# Patient Record
Sex: FEMALE | Race: WHITE | Employment: OTHER | URBAN - METROPOLITAN AREA
[De-identification: names, ages, dates, MRNs, and addresses within clinical notes are randomized per-mention and may not be internally consistent; named-entity substitution may affect disease eponyms.]

---

## 2018-06-02 LAB
CREATININE, EXTERNAL: 0.91
HBA1C MFR BLD HPLC: 7.3 %
LDL-C, EXTERNAL: 78
MICROALBUMIN UR TEST STR-MCNC: 13.8 MG/DL

## 2018-08-24 PROBLEM — N18.2 CHRONIC KIDNEY DISEASE (CKD), STAGE II (MILD): Status: ACTIVE | Noted: 2018-08-24

## 2018-08-24 PROBLEM — G43.109 CLASSIC MIGRAINE: Status: ACTIVE | Noted: 2018-08-24

## 2018-08-24 PROBLEM — E11.9 TYPE 2 DIABETES MELLITUS (HCC): Status: ACTIVE | Noted: 2018-08-24

## 2018-08-24 PROBLEM — E78.00 HYPERCHOLESTEROLEMIA: Status: ACTIVE | Noted: 2018-08-24

## 2018-08-24 PROBLEM — I48.91 ATRIAL FIBRILLATION (HCC): Status: ACTIVE | Noted: 2018-08-24

## 2018-08-24 PROBLEM — M25.50 DIFFUSE ARTHRALGIA: Status: ACTIVE | Noted: 2018-08-24

## 2018-08-24 PROBLEM — M54.2 NECK PAIN: Status: ACTIVE | Noted: 2018-08-24

## 2018-08-24 PROBLEM — E03.9 ACQUIRED HYPOTHYROIDISM: Status: ACTIVE | Noted: 2018-08-24

## 2018-08-24 PROBLEM — M65.30 ACQUIRED TRIGGER FINGER: Status: ACTIVE | Noted: 2018-08-24

## 2018-08-24 PROBLEM — M25.50 JOINT PAIN: Status: ACTIVE | Noted: 2018-08-24

## 2018-08-24 PROBLEM — F32.1 MODERATE MAJOR DEPRESSION (HCC): Status: ACTIVE | Noted: 2018-08-24

## 2018-08-24 PROBLEM — I10 ESSENTIAL HYPERTENSION: Status: ACTIVE | Noted: 2018-08-24

## 2018-08-24 RX ORDER — AMLODIPINE BESYLATE 10 MG/1
TABLET ORAL DAILY
COMMUNITY
End: 2018-08-28 | Stop reason: SDUPTHER

## 2018-08-24 RX ORDER — GLIPIZIDE 5 MG/1
TABLET ORAL 2 TIMES DAILY
COMMUNITY
End: 2018-08-28 | Stop reason: SDUPTHER

## 2018-08-24 RX ORDER — LOSARTAN POTASSIUM 100 MG/1
100 TABLET ORAL DAILY
COMMUNITY
End: 2018-08-28 | Stop reason: SDUPTHER

## 2018-08-24 RX ORDER — CELECOXIB 100 MG/1
CAPSULE ORAL 2 TIMES DAILY
COMMUNITY
End: 2018-08-28 | Stop reason: SDUPTHER

## 2018-08-24 RX ORDER — HYDROCHLOROTHIAZIDE 25 MG/1
25 TABLET ORAL DAILY
COMMUNITY
End: 2018-08-28 | Stop reason: SDUPTHER

## 2018-08-24 RX ORDER — RANITIDINE 150 MG/1
150 TABLET, FILM COATED ORAL 2 TIMES DAILY
COMMUNITY
End: 2018-08-28 | Stop reason: SDUPTHER

## 2018-08-24 RX ORDER — LEVOTHYROXINE SODIUM 137 UG/1
TABLET ORAL
COMMUNITY
End: 2018-08-28 | Stop reason: SDUPTHER

## 2018-08-24 RX ORDER — ATORVASTATIN CALCIUM 80 MG/1
80 TABLET, FILM COATED ORAL DAILY
COMMUNITY
End: 2018-08-28 | Stop reason: SDUPTHER

## 2018-08-24 RX ORDER — METOPROLOL SUCCINATE 50 MG/1
TABLET, EXTENDED RELEASE ORAL DAILY
COMMUNITY
End: 2018-08-28 | Stop reason: SDUPTHER

## 2018-08-28 ENCOUNTER — OFFICE VISIT (OUTPATIENT)
Dept: FAMILY MEDICINE CLINIC | Age: 63
End: 2018-08-28

## 2018-08-28 VITALS
DIASTOLIC BLOOD PRESSURE: 78 MMHG | SYSTOLIC BLOOD PRESSURE: 132 MMHG | HEART RATE: 82 BPM | BODY MASS INDEX: 43.4 KG/M2 | WEIGHT: 293 LBS | HEIGHT: 69 IN | RESPIRATION RATE: 18 BRPM | OXYGEN SATURATION: 99 % | TEMPERATURE: 98.1 F

## 2018-08-28 DIAGNOSIS — E11.8 TYPE 2 DIABETES MELLITUS WITH COMPLICATION, WITHOUT LONG-TERM CURRENT USE OF INSULIN (HCC): Primary | ICD-10-CM

## 2018-08-28 DIAGNOSIS — E78.00 HYPERCHOLESTEROLEMIA: ICD-10-CM

## 2018-08-28 RX ORDER — ATORVASTATIN CALCIUM 10 MG/1
10 TABLET, FILM COATED ORAL DAILY
Qty: 90 TAB | Refills: 1 | Status: SHIPPED | OUTPATIENT
Start: 2018-08-28 | End: 2019-02-04 | Stop reason: SDUPTHER

## 2018-08-28 RX ORDER — METOPROLOL SUCCINATE 50 MG/1
50 TABLET, EXTENDED RELEASE ORAL DAILY
Qty: 90 TAB | Refills: 1 | Status: SHIPPED | OUTPATIENT
Start: 2018-08-28 | End: 2019-02-04 | Stop reason: SDUPTHER

## 2018-08-28 RX ORDER — LOSARTAN POTASSIUM 100 MG/1
100 TABLET ORAL DAILY
Qty: 90 TAB | Refills: 1 | Status: SHIPPED | OUTPATIENT
Start: 2018-08-28 | End: 2019-02-04 | Stop reason: SDUPTHER

## 2018-08-28 RX ORDER — RANITIDINE 150 MG/1
150 TABLET, FILM COATED ORAL 2 TIMES DAILY
Qty: 180 TAB | Refills: 1 | Status: SHIPPED | OUTPATIENT
Start: 2018-08-28 | End: 2019-05-15

## 2018-08-28 RX ORDER — CELECOXIB 100 MG/1
100 CAPSULE ORAL 2 TIMES DAILY
Qty: 180 CAP | Refills: 1 | Status: SHIPPED | OUTPATIENT
Start: 2018-08-28 | End: 2019-04-29 | Stop reason: SDUPTHER

## 2018-08-28 RX ORDER — LEVOTHYROXINE SODIUM 137 UG/1
137 TABLET ORAL
Qty: 90 TAB | Refills: 1 | Status: SHIPPED | OUTPATIENT
Start: 2018-08-28 | End: 2019-02-04 | Stop reason: SDUPTHER

## 2018-08-28 RX ORDER — HYDROCHLOROTHIAZIDE 25 MG/1
25 TABLET ORAL DAILY
Qty: 90 TAB | Refills: 1 | Status: SHIPPED | OUTPATIENT
Start: 2018-08-28 | End: 2019-05-15

## 2018-08-28 RX ORDER — AMLODIPINE BESYLATE 10 MG/1
10 TABLET ORAL DAILY
Qty: 90 TAB | Refills: 1 | Status: SHIPPED | OUTPATIENT
Start: 2018-08-28 | End: 2019-02-04 | Stop reason: SDUPTHER

## 2018-08-28 RX ORDER — GLIPIZIDE 5 MG/1
10 TABLET ORAL 2 TIMES DAILY
Qty: 360 TAB | Refills: 1 | Status: SHIPPED | OUTPATIENT
Start: 2018-08-28 | End: 2018-11-15 | Stop reason: ALTCHOICE

## 2018-08-28 NOTE — PROGRESS NOTES
Hpi:Yara Monzon is a 61 y.o. female presenting for Diabetes (discuss truclity) and Cholesterol Problem (follow up)  . Diabetes type:  Type 2 not on insulin  Medications:  Trulicity (dose increased0 and Glipizide  Medication compliance:  Very good  Side effects of meds:  Some low sugars when she started trulicity (43, 50) Now better  Diet compliance:  Sticking to diet, Some sugar, watching starches but still eats a lot of potatoes. Exercise compliance:  No exercise  Home testing:  Currently checking twice a day most days  Fasting sugar range:    Other sugars later in day:  127 last night, highest late in the day 134  Hypoglycemia:  Only when she started Trulicity. No low sugars past several weeks. Last eye appt and physician:  1/2018 Dr. Gail Luna feet:  Watching her feet time to time  Comorbid conditions:  Nonsmoker, Hyperchol, HTN, A fib, Sleep apnea but doesn't use CPAP (lost some parts and will try to hook back up. Hypercholesterolemia  Off atorvastatin entirely for over a month-6-8 weeks  Due to myalgias  Joints don't hurt nearly as much although knees and feet still hurt. Otherwise not much change. Didn't hurt near as much on lower doses of atorvastatin. Problems before she thinks with Crestor        Lab Results   Component Value Date/Time    Hemoglobin A1c, External 7.3 06/02/2018           Review of Systems - Review of Systems   Constitutional: Negative for chills, fever, malaise/fatigue and weight loss. HENT: Negative for ear pain, hearing loss, nosebleeds and sore throat. Eyes: Negative for blurred vision, double vision, photophobia and pain. Respiratory: Negative for cough, hemoptysis, shortness of breath and wheezing. Cardiovascular: Negative for chest pain, palpitations and leg swelling. Gastrointestinal: Negative for abdominal pain, blood in stool, constipation, diarrhea, heartburn, nausea and vomiting.    Genitourinary: Negative for dysuria, frequency, hematuria and urgency. Musculoskeletal: Negative for back pain, falls, joint pain, myalgias and neck pain. Skin: Negative for itching and rash. Neurological: Negative for speech change, focal weakness, seizures, loss of consciousness and headaches. Endo/Heme/Allergies: Negative for polydipsia. Does not bruise/bleed easily. Psychiatric/Behavioral: Negative for depression, hallucinations, memory loss, substance abuse and suicidal ideas. The patient is not nervous/anxious and does not have insomnia. Past Medical History:   Diagnosis Date    Atrial fibrillation (HCC)     Chronic renal failure     Diabetes ([facility] [address]     Hearing loss     Hypertension     Migraine     Osteoarthritis      Past Surgical History:   Procedure Laterality Date    HX DILATION AND CURETTAGE      HX HYSTERECTOMY       Family History   Problem Relation Age of Onset    Heart Disease Mother     Hypertension Mother     Diabetes Mother      Social History     Social History    Marital status:      Spouse name: N/A    Number of children: N/A    Years of education: N/A     Occupational History    Not on file. Social History Main Topics    Smoking status: Former Smoker     Years: 1.00     Types: Cigarettes    Smokeless tobacco: Never Used    Alcohol use No    Drug use: No    Sexual activity: Not on file     Other Topics Concern    Not on file     Social History Narrative    No narrative on file         Current Outpatient Prescriptions   Medication Sig Dispense Refill    acetaminophen (TYLENOL EXTRA STRENGTH PO) Take  by mouth.  dulaglutide (TRULICITY) 1.5 HC/4.2 mL sub-q pen 1.5 mg by SubCUTAneous route every seven (7) days.  rivaroxaban (XARELTO) 20 mg tab tablet Take  by mouth daily.  celecoxib (CELEBREX) 100 mg capsule Take  by mouth two (2) times a day.  raNITIdine (ZANTAC) 150 mg tablet Take 150 mg by mouth two (2) times a day.  amLODIPine (NORVASC) 10 mg tablet Take  by mouth daily.       losartan (COZAAR) 100 mg tablet Take 100 mg by mouth daily.  glipiZIDE (GLUCOTROL) 5 mg tablet Take  by mouth two (2) times a day.  hydroCHLOROthiazide (HYDRODIURIL) 25 mg tablet Take 25 mg by mouth daily.  metoprolol succinate (TOPROL-XL) 50 mg XL tablet Take  by mouth daily.  levothyroxine (SYNTHROID) 137 mcg tablet Take  by mouth Daily (before breakfast).  atorvastatin (LIPITOR) 80 mg tablet Take 80 mg by mouth daily. Allergies   Allergen Reactions    Ibuprofen Hives       Vitals:    08/28/18 0828   BP: 132/78   Pulse: 82   Resp: 18   Temp: 98.1 °F (36.7 °C)   TempSrc: Oral   SpO2: 99%   Weight: 349 lb (158.3 kg)   Height: 5' 8.5\" (1.74 m)   PainSc:   0 - No pain       Physical Examination:General Well appearing, A&O X 4  Neck without nodes normal thyroid  Lungs clear to ausculation  CV irreg rate 80, No M, R, or G. No edema. Neuro Normal Speech  Psych Oriented to person place and time with normal affect and mood. No hallucinations or abnormal thought      ICD-10-CM ICD-9-CM    1. Type 2 diabetes mellitus with complication, without long-term current use of insulin (HCC) E11.8 250.90    2. Hypercholesterolemia E78.00 272.0      Assessment    1. DM type 2 doing well despite some dietary lapses. Labs today  2. Hyperchol with myalgias from statin. Restart in lower dose  3   A fib rate controlled    PLAN:    All meds refilled  Labs:  A1c CBC, BMP AC ratio      Restart atorvastatin in lower dose and see if joint pain recurs    Diabetes is doing well: Work on avoiding sugar and don't overdue the potatoes    Let me know if you have low sugars that are unexpected    Watch your feet daily. Get a flu shot in October. Diagnosis and plan discussed with patient who verbillized understanding.     [facility]

## 2018-08-28 NOTE — PROGRESS NOTES
1. Have you been to the ER, urgent care clinic since your last visit? Hospitalized since your last visit? No    2. Have you seen or consulted any other health care providers outside of the [address] [place] [address] since your last visit? Include any pap smears or colon screening.  Dr. Cynthia Armando

## 2018-08-28 NOTE — PATIENT INSTRUCTIONS
Restart atorvastatin in lower dose and see if joint pain recurs    Diabetes is doing well: Work on avoiding sugar and don't overdue the potatoes    Let me know if you have low sugars that are unexpected    Watch your feet daily. Get a flu shot in October.

## 2018-08-28 NOTE — MR AVS SNAPSHOT
[address] [place] [address] [place] [address] 
[phone] Patient: Magalis Villa MRN: [id number] PIM:[birth year] Visit Information Date & Time Provider Department Dept. Phone Encounter #  
 8/28/2018  8:30 AM Rosalinda Essex, MD [facility] 144 [phone] [id number] Follow-up Instructions Return in about 3 months (around 11/28/2018). Upcoming Health Maintenance Date Due Hepatitis C Screening 1955 FOOT EXAM Q1 1/4/1965 EYE EXAM RETINAL OR DILATED Q1 1/4/1965 Pneumococcal 19-64 Medium Risk (1 of 1 - PPSV23) 1/4/1974 DTaP/Tdap/Td series (1 - Tdap) 1/4/1976 PAP AKA CERVICAL CYTOLOGY 1/4/1976 FOBT Q 1 YEAR AGE 50-75 1/4/2005 ZOSTER VACCINE AGE 60> 11/4/2014 Influenza Age 5 to Adult 8/1/2018 BREAST CANCER SCRN MAMMOGRAM 8/12/2018 HEMOGLOBIN A1C Q6M 12/2/2018 MICROALBUMIN Q1 6/2/2019 LIPID PANEL Q1 6/2/2019 Allergies as of 8/28/2018  Review Complete On: 8/28/2018 By: Rosalinda Essex, MD  
  
 Severity Noted Reaction Type Reactions Ibuprofen  08/24/2018    Hives Current Immunizations  Never Reviewed No immunizations on file. Not reviewed this visit You Were Diagnosed With   
  
 Codes Comments Type 2 diabetes mellitus with complication, without long-term current use of insulin (HCC)    -  Primary ICD-10-CM: E11.8 ICD-9-CM: 250.90 Hypercholesterolemia     ICD-10-CM: E78.00 ICD-9-CM: 272.0 Vitals BP Pulse Temp Resp Height(growth percentile) Weight(growth percentile) 132/78 (BP 1 Location: Left arm, BP Patient Position: Sitting) 82 98.1 °F (36.7 °C) (Oral) 18 5' 8.5\" (1.74 m) 349 lb (158.3 kg) SpO2 BMI OB Status Smoking Status 99% 52.29 kg/m2 Hysterectomy Former Smoker Vitals History BMI and BSA Data Body Mass Index Body Surface Area  
 52.29 kg/m 2 2.77 m 2 Preferred Pharmacy Pharmacy Name Phone [address] [place] [address] Shanique Funes [phone] Your Updated Medication List  
  
   
This list is accurate as of 18  9:18 AM.  Always use your most recent med list. amLODIPine 10 mg tablet Commonly known as:  [place] Take 1 Tab by mouth daily. atorvastatin 10 mg tablet Commonly known as:  LIPITOR Take 1 Tab by mouth daily. celecoxib 100 mg capsule Commonly known as:  CeleBREX Take 1 Cap by mouth two (2) times a day. dulaglutide 1.5 mg/0.5 mL sub-q pen Commonly known as:  TRULICITY  
0.5 mL by SubCUTAneous route every seven (7) days. Indications: type 2 diabetes mellitus  
  
 glipiZIDE 5 mg tablet Commonly known as:  Jocelin  Take 2 Tabs by mouth two (2) times a day. hydroCHLOROthiazide 25 mg tablet Commonly known as:  HYDRODIURIL Take 1 Tab by mouth daily. levothyroxine 137 mcg tablet Commonly known as:  SYNTHROID Take 137 mcg by mouth Daily (before breakfast). losartan 100 mg tablet Commonly known as:  COZAAR Take 1 Tab by mouth daily. metoprolol succinate 50 mg XL tablet Commonly known as:  TOPROL-XL Take 1 Tab by mouth daily. raNITIdine 150 mg tablet Commonly known as:  ZANTAC Take 1 Tab by mouth two (2) times a day. rivaroxaban 20 mg Tab tablet Commonly known as:  Edmonia Punch Take 1 Tab by mouth daily. TYLENOL EXTRA STRENGTH PO Take  by mouth. Prescriptions Sent to Pharmacy Refills  
 rivaroxaban (XARELTO) 20 mg tab tablet 1 Sig: Take 1 Tab by mouth daily. Class: Normal  
 Pharmacy: [address] [place] [address]  Ph #: [phone] Route: Oral  
 dulaglutide (TRULICITY) 1.5 BG/1.5 mL sub-q pen 1 Si.5 mL by SubCUTAneous route every seven (7) days. Indications: type 2 diabetes mellitus Class: Normal  
 Pharmacy: [address] [place] [address]  Ph #: [phone] Route: SubCUTAneous  
 amLODIPine (NORVASC) 10 mg tablet 1 Sig: Take 1 Tab by mouth daily. Class: Normal  
 Pharmacy: [address] [place] [address] #: [phone] Route: Oral  
 atorvastatin (LIPITOR) 10 mg tablet 1 Sig: Take 1 Tab by mouth daily. Class: Normal  
 Pharmacy: [address] [place] [address] Ph #: [phone] Route: Oral  
 celecoxib (CELEBREX) 100 mg capsule 1 Sig: Take 1 Cap by mouth two (2) times a day. Class: Normal  
 Pharmacy: [address] [place] [address] Ph #: [phone] Route: Oral  
 glipiZIDE (GLUCOTROL) 5 mg tablet 1 Sig: Take 2 Tabs by mouth two (2) times a day. Class: Normal  
 Pharmacy: [address] [place] [address] Ph #: [phone] Route: Oral  
 hydroCHLOROthiazide (HYDRODIURIL) 25 mg tablet 1 Sig: Take 1 Tab by mouth daily. Class: Normal  
 Pharmacy: [address] [place] [address] Ph #: [phone] Route: Oral  
 levothyroxine (SYNTHROID) 137 mcg tablet 1 Sig: Take 137 mcg by mouth Daily (before breakfast). Class: Normal  
 Pharmacy: [address] [place] [address] Ph #: [phone] Route: Oral  
 losartan (COZAAR) 100 mg tablet 1 Sig: Take 1 Tab by mouth daily. Class: Normal  
 Pharmacy: [address] [place] [address] Ph #: [phone] Route: Oral  
 metoprolol succinate (TOPROL-XL) 50 mg XL tablet 1 Sig: Take 1 Tab by mouth daily. Class: Normal  
 Pharmacy: [address] [place] [address] Ph #: [phone] Route: Oral  
 raNITIdine (ZANTAC) 150 mg tablet 1 Sig: Take 1 Tab by mouth two (2) times a day. Class: Normal  
 Pharmacy: [address] [place] [address] #: [phone]  Route: Oral  
  
 We Performed the Following CBC WITH AUTOMATED DIFF [45208 CPT(R)] HEMOGLOBIN A1C WITH EAG [54904 CPT(R)] METABOLIC PANEL, BASIC [93475 CPT(R)] MICROALBUMIN, UR, RAND W/ MICROALB/CREAT RATIO [id number] CPT(R)] Follow-up Instructions Return in about 3 months (around 11/28/2018). Patient Instructions Restart atorvastatin in lower dose and see if joint pain recurs Diabetes is doing well: Work on avoiding sugar and don't overdue the potatoes Let me know if you have low sugars that are unexpected Watch your feet daily. Get a flu shot in October. Introducing [facility]! [employer] introduces [employer] patient portal. Now you can access parts of your medical record, email your doctor's office, and request medication refills online. 1. In your internet browser, go to [email or web address] 2. Click on the First Time User? Click Here link in the Sign In box. You will see the New Member Sign Up page. 3. Enter your [employer] Access Code exactly as it appears below. You will not need to use this code after youve completed the sign-up process. If you do not sign up before the expiration date, you must request a new code. · [employer] Access Code: [email or web address] Expires: 11/26/2018  9:18 AM 
 
4. Enter the last four digits of your Social Security Number (xxxx) and Date of Birth (mm/dd/yyyy) as indicated and click Submit. You will be taken to the next sign-up page. 5. Create a [employer] ID. This will be your [employer] login ID and cannot be changed, so think of one that is secure and easy to remember. 6. Create a [employer] password. You can change your password at any time. 7. Enter your Password Reset Question and Answer. This can be used at a later time if you forget your password. 8. Enter your e-mail address. You will receive e-mail notification when new information is available in [address] 9. Click Sign Up. You can now view and download portions of your medical record. 10. Click the Download Summary menu link to download a portable copy of your medical information. If you have questions, please visit the Frequently Asked Questions section of the "[employer] website. Remember, "[employer] is NOT to be used for urgent needs. For medical emergencies, dial 911. Now available from your iPhone and Android! Please provide this summary of care documentation to your next provider. If you have any questions after today's visit, please call [phone].

## 2018-08-29 LAB
ALBUMIN/CREAT UR: 4.4 MG/G CREAT (ref 0–30)
BASOPHILS # BLD AUTO: 0 X10E3/UL (ref 0–0.2)
BASOPHILS NFR BLD AUTO: 0 %
BUN SERPL-MCNC: 19 MG/DL (ref 8–27)
BUN/CREAT SERPL: 22 (ref 12–28)
CALCIUM SERPL-MCNC: 9.3 MG/DL (ref 8.7–10.3)
CHLORIDE SERPL-SCNC: 99 MMOL/L (ref 96–106)
CO2 SERPL-SCNC: 26 MMOL/L (ref 20–29)
CREAT SERPL-MCNC: 0.87 MG/DL (ref 0.57–1)
CREAT UR-MCNC: 113.6 MG/DL
EOSINOPHIL # BLD AUTO: 0.3 X10E3/UL (ref 0–0.4)
EOSINOPHIL NFR BLD AUTO: 3 %
ERYTHROCYTE [DISTWIDTH] IN BLOOD BY AUTOMATED COUNT: 14.8 % (ref 12.3–15.4)
EST. AVERAGE GLUCOSE BLD GHB EST-MCNC: 137 MG/DL
GLUCOSE SERPL-MCNC: 97 MG/DL (ref 65–99)
HBA1C MFR BLD: 6.4 % (ref 4.8–5.6)
HCT VFR BLD AUTO: 42.9 % (ref 34–46.6)
HGB BLD-MCNC: 13.7 G/DL (ref 11.1–15.9)
IMM GRANULOCYTES # BLD: 0 X10E3/UL (ref 0–0.1)
IMM GRANULOCYTES NFR BLD: 0 %
LYMPHOCYTES # BLD AUTO: 2.4 X10E3/UL (ref 0.7–3.1)
LYMPHOCYTES NFR BLD AUTO: 29 %
MCH RBC QN AUTO: 27.1 PG (ref 26.6–33)
MCHC RBC AUTO-ENTMCNC: 31.9 G/DL (ref 31.5–35.7)
MCV RBC AUTO: 85 FL (ref 79–97)
MICROALBUMIN UR-MCNC: 5 UG/ML
MONOCYTES # BLD AUTO: 0.4 X10E3/UL (ref 0.1–0.9)
MONOCYTES NFR BLD AUTO: 5 %
NEUTROPHILS # BLD AUTO: 5.1 X10E3/UL (ref 1.4–7)
NEUTROPHILS NFR BLD AUTO: 63 %
PLATELET # BLD AUTO: 307 X10E3/UL (ref 150–379)
POTASSIUM SERPL-SCNC: 4 MMOL/L (ref 3.5–5.2)
RBC # BLD AUTO: 5.05 X10E6/UL (ref 3.77–5.28)
SODIUM SERPL-SCNC: 142 MMOL/L (ref 134–144)
WBC # BLD AUTO: 8.2 X10E3/UL (ref 3.4–10.8)

## 2018-08-30 NOTE — PROGRESS NOTES
LVM with patient with results and instructions  Blood work looks very good.  Diabetic control is excellent.     Keep working on diet and taking meds consistently. She should watch out for low sugars:  Headache, sweats, shakiness, feeling like she has to eat. If she has low sugars, under 70, she needs to let us know.  We may need to reduce the dose of her glipizide. She should see me in 3-4 months sooner as needed.    Stated to call if any questions

## 2018-10-18 ENCOUNTER — OFFICE VISIT (OUTPATIENT)
Dept: FAMILY MEDICINE CLINIC | Age: 63
End: 2018-10-18

## 2018-10-18 VITALS
HEART RATE: 75 BPM | WEIGHT: 293 LBS | BODY MASS INDEX: 43.4 KG/M2 | TEMPERATURE: 97.9 F | OXYGEN SATURATION: 97 % | DIASTOLIC BLOOD PRESSURE: 80 MMHG | SYSTOLIC BLOOD PRESSURE: 147 MMHG | RESPIRATION RATE: 18 BRPM | HEIGHT: 69 IN

## 2018-10-18 DIAGNOSIS — Z12.39 BREAST CANCER SCREENING: ICD-10-CM

## 2018-10-18 DIAGNOSIS — I48.20 CHRONIC ATRIAL FIBRILLATION (HCC): ICD-10-CM

## 2018-10-18 DIAGNOSIS — E78.00 HYPERCHOLESTEREMIA: ICD-10-CM

## 2018-10-18 DIAGNOSIS — Z12.11 COLON CANCER SCREENING: ICD-10-CM

## 2018-10-18 DIAGNOSIS — E03.9 ACQUIRED HYPOTHYROIDISM: ICD-10-CM

## 2018-10-18 DIAGNOSIS — Z23 ENCOUNTER FOR IMMUNIZATION: Primary | ICD-10-CM

## 2018-10-18 DIAGNOSIS — E11.9 CONTROLLED TYPE 2 DIABETES MELLITUS WITHOUT COMPLICATION, WITHOUT LONG-TERM CURRENT USE OF INSULIN (HCC): ICD-10-CM

## 2018-10-18 NOTE — PATIENT INSTRUCTIONS
Diabetes:  Blood sugar goals:  Hemoglobin A1c under 7  Fasting blood sugar   Blood sugar 2 hours after a meal under 180, 4 hours after a meal under 120  No hypoglycemia (sugars under 70 and symptomatic low sugars)    Blood sugar control with diet and exercise:  Exercise 45 minutes per day. This makes your insulin work better. It also allows insulin levels to fall helping with weight loss. Every night, try to fast from your evening meal to breakfast (at least 12 hours) without eating anything. This uses stored energy in your liver and makes insulin work better. Avoid simples sugars such as table sugar in drinks (sodas, lemonade, sweet tea, wine), desserts, candy. Also avoid fruit juices and high fructose corn syrup. Finally, drink less milk which has milk sugar in it. Control your starch intake. Men should have 3-4 carb portions per meal.  Women should have 2-3 carb portions per meal.  A carb serving is the equivalent of a slice of bread. Control your blood pressure and cholesterol. These problems are common in diabetes. AND, don't smoke. All of these problems contribute to heart disease and stroke risk. Get a yearly eye exam and flu shot. Make sure your vaccines are up to date particularly the pneumonia vaccines. Inspect your feet daily. Wear comfortable protective shoes and clean socks. Seek medical care if there are sore, calluses or numbness and burning of your feet. See your doctor at least every 6 months if you are on oral medicines or more often if the diabetic control is not at goal or if you are on insulin. Take your medicines religiously.

## 2018-10-18 NOTE — PROGRESS NOTES
Liudmila Sage is a 61 y.o. female who had concerns including Complete Physical.    Health maintenance:    Immunizations needed: Tdap or DT given today   Pneumovacc 23 done 2013   Prevnar 13 done 2015   Shingrix needs    Influenza vaccine given today    Cancer screening status   Colonoscopy never   PAP hysterrectomy 1994 bleeding and clots, All preceeding paps normal, one ovary remains   Mammogram 2016   Lung CT non smoker    Bone density screening never    Cardiovascular risk factors:   Smoking smoked as a teenager only   HTN under rx    Cholesterol under rx   Diabetes under rx   Chronic kidney disease  no   Family History    Last eye exam:  January 2018, previous eye doctor retired  Last dental exam:  Saw in June 2018    Hypertension  Needs labs,  Near goal  Doesn't check at home    DM2  Metformin, glipizide and trulicity  Takes meds consistently  Some problems sticking to diet    Obesity  BMI 51    Meds:    Current Outpatient Medications:     varicella-zoster recombinant, PF, (SHINGRIX) 50 mcg/0.5 mL susr injection, 0.5 mL by IntraMUSCular route once for 1 dose. Repeat second dose in 6 months., Disp: 0.5 mL, Rfl: 1    acetaminophen (TYLENOL EXTRA STRENGTH PO), Take  by mouth., Disp: , Rfl:     rivaroxaban (XARELTO) 20 mg tab tablet, Take 1 Tab by mouth daily. , Disp: 90 Tab, Rfl: 1    dulaglutide (TRULICITY) 1.5 UW/5.6 mL sub-q pen, 0.5 mL by SubCUTAneous route every seven (7) days. Indications: type 2 diabetes mellitus, Disp: 12 Pen, Rfl: 1    amLODIPine (NORVASC) 10 mg tablet, Take 1 Tab by mouth daily. , Disp: 90 Tab, Rfl: 1    atorvastatin (LIPITOR) 10 mg tablet, Take 1 Tab by mouth daily. , Disp: 90 Tab, Rfl: 1    celecoxib (CELEBREX) 100 mg capsule, Take 1 Cap by mouth two (2) times a day., Disp: 180 Cap, Rfl: 1    glipiZIDE (GLUCOTROL) 5 mg tablet, Take 2 Tabs by mouth two (2) times a day.  (Patient taking differently: Take 5 mg by mouth daily.), Disp: 360 Tab, Rfl: 1    hydroCHLOROthiazide (HYDRODIURIL) 25 mg tablet, Take 1 Tab by mouth daily. , Disp: 90 Tab, Rfl: 1    levothyroxine (SYNTHROID) 137 mcg tablet, Take 137 mcg by mouth Daily (before breakfast). , Disp: 90 Tab, Rfl: 1    losartan (COZAAR) 100 mg tablet, Take 1 Tab by mouth daily. , Disp: 90 Tab, Rfl: 1    metoprolol succinate (TOPROL-XL) 50 mg XL tablet, Take 1 Tab by mouth daily. , Disp: 90 Tab, Rfl: 1    raNITIdine (ZANTAC) 150 mg tablet, Take 1 Tab by mouth two (2) times a day., Disp: 180 Tab, Rfl: 1   Allergies:   Allergies   Allergen Reactions    Ibuprofen Hives     Smoker:   Social History     Tobacco Use   Smoking Status Former Smoker    Years: 1.00    Types: Cigarettes   Smokeless Tobacco Never Used     ETOH:   Social History     Substance and Sexual Activity   Alcohol Use No       FH:    Family History   Problem Relation Age of Onset    Heart Disease Mother     Hypertension Mother     Diabetes Mother        ROS:  General/Constitutional:  No headache, fever, fatigue, weight loss or weight gain     Eyes:  No redness, pruritis, pain, visual changes, swelling, or discharge    Ears:  No pain, loss or changes in hearin    Neck:  No swelling, masses, stiffness, pain, or limited movemen    Cardiac:   No chest pain    Respiratory:  No cough or shortness of breath    GI:  No nausea/vomiting, diarrhea, abdominal pain, bloody or dark stools     :  No dysuria or  hematuria   Neurological:  No loss of consciousness, dizziness, seizures, dysarthria, cognitive changes, memory changes,  problems with balance, or unilateral weakness    Skin: No rash         Physical Exam:  Visit Vitals  /80 (BP 1 Location: Right arm, BP Patient Position: Sitting)   Pulse 75   Temp 97.9 °F (36.6 °C) (Oral)   Resp 18   Ht 5' 8.5\" (1.74 m)   Wt 345 lb (156.5 kg)   SpO2 97%   BMI 51.69 kg/m²       Physical Examination:   General appearance - alert, well appearing, and in no distress, oriented to person, place, and time and normal appearing weight  Mental status -  normal mood, behavior, speech, dress, motor activity, and thought processes, no expressed suicidal or homicidal ideation, no hallucinations  Ears - bilateral TM's and external ear canals normal  Nose - normal and patent, no erythema, discharge or polyps  Mouth - mucous membranes moist, pharynx normal without lesions  Neck - supple, no significant adenopathy  Breast-sees GYN  Chest - normal chest excursion, normal inspiratory and expiratory function. Clear to ausculation bilaterally. Heart - normal rate, regular rhythm, normal S1, S2, no murmurs, rubs, clicks or gallops, no extremity edema  Abdomen- benign, no organomegaly or masses  GYN-sees GYN  Skin-no rashes or suspicious moles  Neuro normal speech, moves all extremities, normal gait  Musculoskeletal- grossly normal joint and motor function. d to person, place, and time and normal appearing weight      Assessment and Plan:    1. Encounter for immunization  Rx for shingrix given  - INFLUENZA VIRUS VAC QUAD,SPLIT,PRESV FREE SYRINGE IM  - TETANUS AND DIPHTHERIA TOXOIDS (TD) ADSORBED, PRES. FREE, IN INDIVIDS. >=7, IM    2. Colon cancer screening  Referral done  - REFERRAL TO GASTROENTEROLOGY    3. Breast cancer screening  - [facility] MAMMO BI SCREENING INCL CAD; Future    4. Controlled type 2 diabetes mellitus without complication, without long-term current use of insulin ([facility] [address]  Would like to get off of glipizide if possible and work on weight and diet  FU one month  - CBC WITH AUTOMATED DIFF  - HEMOGLOBIN A1C WITH EAG  - METABOLIC PANEL, BASIC  - MICROALBUMIN, UR, RAND W/ MICROALB/CREAT RATIO  - VITAMIN B12    5. Acquired hypothyroidism  Euthyroid with last blood work  - TSH 3RD GENERATION    6. Hypercholesteremia  On statin  - HEPATIC FUNCTION PANEL  - LIPID PANEL    7. Atrial fibrillation  On Beta blocker and Xarelto  Seeing Cardiology at [address] [place] [address]      Diagnoses and plans were discussed with the patient.   After visit summary given to the patient as rosario.    Dayday Elena MD

## 2018-10-19 LAB
ALBUMIN SERPL-MCNC: 4.2 G/DL (ref 3.6–4.8)
ALBUMIN/CREAT UR: 4.8 MG/G CREAT (ref 0–30)
ALP SERPL-CCNC: 93 IU/L (ref 39–117)
ALT SERPL-CCNC: 9 IU/L (ref 0–32)
AST SERPL-CCNC: 12 IU/L (ref 0–40)
BASOPHILS # BLD AUTO: 0 X10E3/UL (ref 0–0.2)
BASOPHILS NFR BLD AUTO: 0 %
BILIRUB DIRECT SERPL-MCNC: 0.19 MG/DL (ref 0–0.4)
BILIRUB SERPL-MCNC: 0.8 MG/DL (ref 0–1.2)
BUN SERPL-MCNC: 19 MG/DL (ref 8–27)
BUN/CREAT SERPL: 19 (ref 12–28)
CALCIUM SERPL-MCNC: 9.4 MG/DL (ref 8.7–10.3)
CHLORIDE SERPL-SCNC: 98 MMOL/L (ref 96–106)
CHOLEST SERPL-MCNC: 154 MG/DL (ref 100–199)
CO2 SERPL-SCNC: 26 MMOL/L (ref 20–29)
CREAT SERPL-MCNC: 1 MG/DL (ref 0.57–1)
CREAT UR-MCNC: 193 MG/DL
EOSINOPHIL # BLD AUTO: 0.3 X10E3/UL (ref 0–0.4)
EOSINOPHIL NFR BLD AUTO: 3 %
ERYTHROCYTE [DISTWIDTH] IN BLOOD BY AUTOMATED COUNT: 15.9 % (ref 12.3–15.4)
EST. AVERAGE GLUCOSE BLD GHB EST-MCNC: 128 MG/DL
GLUCOSE SERPL-MCNC: 93 MG/DL (ref 65–99)
HBA1C MFR BLD: 6.1 % (ref 4.8–5.6)
HCT VFR BLD AUTO: 40.7 % (ref 34–46.6)
HDLC SERPL-MCNC: 42 MG/DL
HGB BLD-MCNC: 13.5 G/DL (ref 11.1–15.9)
IMM GRANULOCYTES # BLD: 0 X10E3/UL (ref 0–0.1)
IMM GRANULOCYTES NFR BLD: 1 %
LDLC SERPL CALC-MCNC: 87 MG/DL (ref 0–99)
LYMPHOCYTES # BLD AUTO: 2.3 X10E3/UL (ref 0.7–3.1)
LYMPHOCYTES NFR BLD AUTO: 27 %
MCH RBC QN AUTO: 27.1 PG (ref 26.6–33)
MCHC RBC AUTO-ENTMCNC: 33.2 G/DL (ref 31.5–35.7)
MCV RBC AUTO: 82 FL (ref 79–97)
MICROALBUMIN UR-MCNC: 9.2 UG/ML
MONOCYTES # BLD AUTO: 0.7 X10E3/UL (ref 0.1–0.9)
MONOCYTES NFR BLD AUTO: 8 %
NEUTROPHILS # BLD AUTO: 5.3 X10E3/UL (ref 1.4–7)
NEUTROPHILS NFR BLD AUTO: 61 %
PLATELET # BLD AUTO: 278 X10E3/UL (ref 150–379)
POTASSIUM SERPL-SCNC: 3.7 MMOL/L (ref 3.5–5.2)
PROT SERPL-MCNC: 7.1 G/DL (ref 6–8.5)
RBC # BLD AUTO: 4.99 X10E6/UL (ref 3.77–5.28)
SODIUM SERPL-SCNC: 141 MMOL/L (ref 134–144)
TRIGL SERPL-MCNC: 126 MG/DL (ref 0–149)
TSH SERPL DL<=0.005 MIU/L-ACNC: 1.26 UIU/ML (ref 0.45–4.5)
VIT B12 SERPL-MCNC: 213 PG/ML (ref 232–1245)
VLDLC SERPL CALC-MCNC: 25 MG/DL (ref 5–40)
WBC # BLD AUTO: 8.5 X10E3/UL (ref 3.4–10.8)

## 2018-10-22 ENCOUNTER — OFFICE VISIT (OUTPATIENT)
Dept: FAMILY MEDICINE CLINIC | Age: 63
End: 2018-10-22

## 2018-10-22 DIAGNOSIS — E53.8 VITAMIN B 12 DEFICIENCY: Primary | ICD-10-CM

## 2018-10-22 RX ORDER — CYANOCOBALAMIN 1000 UG/ML
1000 INJECTION, SOLUTION INTRAMUSCULAR; SUBCUTANEOUS
Qty: 10 ML | Refills: 0 | Status: SHIPPED | OUTPATIENT
Start: 2018-10-22 | End: 2019-05-15

## 2018-10-22 RX ORDER — CYANOCOBALAMIN 1000 UG/ML
1000 INJECTION, SOLUTION INTRAMUSCULAR; SUBCUTANEOUS ONCE
Qty: 1 ML | Refills: 0 | Status: SHIPPED | COMMUNITY
Start: 2018-10-22 | End: 2018-10-22

## 2018-10-22 NOTE — TELEPHONE ENCOUNTER
Called with normal DM labs  B12 213. Start Vit B12 IM for one month weekly then monthly after that. FU as scheduled next week.

## 2018-10-22 NOTE — PROGRESS NOTES
Patient came in for B12 injection. Patient tolerated well. Patient advised to wait in office 15min after injection to ensure no negative reaction. Given in Right Deltoid.

## 2018-10-25 ENCOUNTER — DOCUMENTATION ONLY (OUTPATIENT)
Dept: FAMILY MEDICINE CLINIC | Age: 63
End: 2018-10-25

## 2018-10-25 NOTE — PROGRESS NOTES
ALEXA Blackwell faxed to [facility] at [phone]. Placed in basket to have scanned.

## 2018-10-29 ENCOUNTER — CLINICAL SUPPORT (OUTPATIENT)
Dept: FAMILY MEDICINE CLINIC | Age: 63
End: 2018-10-29

## 2018-10-29 DIAGNOSIS — E53.8 VITAMIN B 12 DEFICIENCY: Primary | ICD-10-CM

## 2018-10-29 RX ORDER — CYANOCOBALAMIN 1000 UG/ML
1000 INJECTION, SOLUTION INTRAMUSCULAR; SUBCUTANEOUS ONCE
Qty: 1 ML | Refills: 0 | Status: SHIPPED | COMMUNITY
Start: 2018-10-29 | End: 2018-10-29

## 2018-10-29 NOTE — PROGRESS NOTES
After obtaining consent, and per orders of Dr. Brandie Weiss, injection of B12  given by Danny Al LPN. Patient instructed to remain in clinic for 20 minutes afterwards, and to report any adverse reaction to me immediately. Given in Left Deltoid.

## 2018-11-05 ENCOUNTER — OFFICE VISIT (OUTPATIENT)
Dept: FAMILY MEDICINE CLINIC | Age: 63
End: 2018-11-05

## 2018-11-05 VITALS
SYSTOLIC BLOOD PRESSURE: 131 MMHG | TEMPERATURE: 98.1 F | HEART RATE: 83 BPM | OXYGEN SATURATION: 98 % | DIASTOLIC BLOOD PRESSURE: 65 MMHG | RESPIRATION RATE: 18 BRPM

## 2018-11-05 DIAGNOSIS — E53.8 VITAMIN B 12 DEFICIENCY: Primary | ICD-10-CM

## 2018-11-05 RX ORDER — CYANOCOBALAMIN 1000 UG/ML
1000 INJECTION, SOLUTION INTRAMUSCULAR; SUBCUTANEOUS ONCE
Qty: 1 ML | Refills: 0 | Status: SHIPPED | COMMUNITY
Start: 2018-11-05 | End: 2018-11-05

## 2018-11-05 NOTE — PROGRESS NOTES
After obtaining consent, and per orders of Dr. Tariq Avina, injection of vitamin b12  given by Barrett Romero LPN in right deltoid. Patient instructed to remain in clinic for 20 minutes afterwards, and to report any adverse reaction to me immediately.

## 2018-11-12 ENCOUNTER — OFFICE VISIT (OUTPATIENT)
Dept: FAMILY MEDICINE CLINIC | Age: 63
End: 2018-11-12

## 2018-11-12 VITALS
HEART RATE: 78 BPM | RESPIRATION RATE: 18 BRPM | DIASTOLIC BLOOD PRESSURE: 83 MMHG | SYSTOLIC BLOOD PRESSURE: 139 MMHG | TEMPERATURE: 98.6 F | OXYGEN SATURATION: 96 %

## 2018-11-12 DIAGNOSIS — E53.8 VITAMIN B 12 DEFICIENCY: Primary | ICD-10-CM

## 2018-11-12 RX ORDER — CYANOCOBALAMIN 1000 UG/ML
1000 INJECTION, SOLUTION INTRAMUSCULAR; SUBCUTANEOUS ONCE
Qty: 1 ML | Refills: 0 | Status: SHIPPED | COMMUNITY
Start: 2018-11-12 | End: 2018-11-12

## 2018-11-15 ENCOUNTER — OFFICE VISIT (OUTPATIENT)
Dept: FAMILY MEDICINE CLINIC | Age: 63
End: 2018-11-15

## 2018-11-15 VITALS
HEIGHT: 69 IN | WEIGHT: 293 LBS | TEMPERATURE: 98.8 F | OXYGEN SATURATION: 99 % | RESPIRATION RATE: 20 BRPM | HEART RATE: 81 BPM | SYSTOLIC BLOOD PRESSURE: 107 MMHG | DIASTOLIC BLOOD PRESSURE: 74 MMHG | BODY MASS INDEX: 43.4 KG/M2

## 2018-11-15 DIAGNOSIS — Z12.11 COLON CANCER SCREENING: ICD-10-CM

## 2018-11-15 DIAGNOSIS — E11.8 TYPE 2 DIABETES MELLITUS WITH COMPLICATION, WITHOUT LONG-TERM CURRENT USE OF INSULIN (HCC): Primary | ICD-10-CM

## 2018-11-15 NOTE — PROGRESS NOTES
Hpi:Yara Gayle is a 61 y.o. female presenting for Diabetes and Vitamin B12 Deficiency      History of Present Illness:  Never got call about colon CA screening    DM2  A1c 6.1  On metformin, trulicity and glipizide  Other weight gain med is metoprolol but needs that for A fib    Obesity  On diet and trying to watch sugar  Review of Systems   Constitutional: Negative for weight loss. Respiratory: Negative for shortness of breath. Cardiovascular: Positive for palpitations. Negative for chest pain and leg swelling. Neurological: Negative for seizures and loss of consciousness.        Past Medical History:   Diagnosis Date    Atrial fibrillation (HCC)     Chronic renal failure     Diabetes ([facility] [address]     Hearing loss     Hypertension     Migraine     Osteoarthritis      Past Surgical History:   Procedure Laterality Date    HX DILATION AND CURETTAGE      HX HYSTERECTOMY       Family History   Problem Relation Age of Onset    Heart Disease Mother     Hypertension Mother     Diabetes Mother      Social History     Socioeconomic History    Marital status:      Spouse name: Not on file    Number of children: Not on file    Years of education: Not on file    Highest education level: Not on file   Social Needs    Financial resource strain: Not on file    Food insecurity - worry: Not on file    Food insecurity - inability: Not on file   [employer] needs - medical: Not on file   [employer] needs - non-medical: Not on file   Occupational History    Not on file   Tobacco Use    Smoking status: Former Smoker     Years: 1.00     Types: Cigarettes    Smokeless tobacco: Never Used   Substance and Sexual Activity    Alcohol use: No    Drug use: No    Sexual activity: Not on file   Other Topics Concern    Not on file   Social History Narrative    Not on file         Current Outpatient Medications   Medication Sig Dispense Refill    SITagliptin (JANUVIA) 100 mg tablet Take 1 Tab by mouth daily. 90 Tab 1    dulaglutide (TRULICITY) 1.5 AY/2.9 mL sub-q pen 0.5 mL by SubCUTAneous route every seven (7) days. 12 Pen 1    cyanocobalamin (VITAMIN B-12) 1,000 mcg/mL injection 1 mL by IntraMUSCular route every seven (7) days. Weekly for one month then monthly thereafter. 10 mL 0    acetaminophen (TYLENOL EXTRA STRENGTH PO) Take  by mouth.  rivaroxaban (XARELTO) 20 mg tab tablet Take 1 Tab by mouth daily. 90 Tab 1    amLODIPine (NORVASC) 10 mg tablet Take 1 Tab by mouth daily. 90 Tab 1    atorvastatin (LIPITOR) 10 mg tablet Take 1 Tab by mouth daily. 90 Tab 1    celecoxib (CELEBREX) 100 mg capsule Take 1 Cap by mouth two (2) times a day. 180 Cap 1    hydroCHLOROthiazide (HYDRODIURIL) 25 mg tablet Take 1 Tab by mouth daily. 90 Tab 1    levothyroxine (SYNTHROID) 137 mcg tablet Take 137 mcg by mouth Daily (before breakfast). 90 Tab 1    losartan (COZAAR) 100 mg tablet Take 1 Tab by mouth daily. 90 Tab 1    metoprolol succinate (TOPROL-XL) 50 mg XL tablet Take 1 Tab by mouth daily. 90 Tab 1    raNITIdine (ZANTAC) 150 mg tablet Take 1 Tab by mouth two (2) times a day. 180 Tab 1         Allergies   Allergen Reactions    Ibuprofen Hives       Vitals:    11/15/18 0953   BP: 107/74   Pulse: 81   Resp: 20   Temp: 98.8 °F (37.1 °C)   TempSrc: Oral   SpO2: 99%   Weight: 346 lb (156.9 kg)   Height: 5' 8.5\" (1.74 m)     Body mass index is 51.84 kg/m². Objective  General: Patient alert and oriented and in NAD  HEENT: PER/EOMI, no conjunctival pallor or scleral icterus. No thyromegaly or cervical lymphadenopathy  Heart:irregular rate and rhythm, No murmurs, rubs or gallops. Lungs: Clear to auscultation bilaterally, no wheezing, rales or rhonchi  Ext: No edema  Skin: No rashes or lesions noted on exposed skin  Neuro: AAOx3  Psych: Appropriate mood and affect     Diabetic Foot Exam:  Protective sensation by monofilament is intact bilaterally. Pedal pulses are 2+ and normal bilaterally.   L foot skin inspection:  normal skin and soft tissue with no gross edema or evidence of acute injury or foot ulcer  R foot skin inspection:  skin and soft tissue appear normal with no significant edema or evidence of acute injury or foot ulcer     Assessment and Plan:    1. Type 2 diabetes mellitus with complication, without long-term current use of insulin (HCC)  Stop glipizide, continue work on diet and sugar avoidance  - SITagliptin (JANUVIA) 100 mg tablet; Take 1 Tab by mouth daily. Dispense: 90 Tab; Refill: 1  -  DIABETES FOOT EXAM    2. Body mass index (BMI) of 50.0 to 59.9 in adult [facility])  See above    3. Colon cancer screening  Reminded that she still needs SHINGRIX, colonoscopy and mammogram  - REFERRAL TO GASTROENTEROLOGY          Diagnosis and plan discussed with patient who verbillized understanding. Follow-up Disposition:  Return in about 3 months (around 2/15/2019) for Diabetes follow up.     [facility]

## 2018-12-03 NOTE — PERIOP NOTES
[address] [place] [address] Dr Bernard Preprocedure Instructions      1. On the day of your surgery, please report to registration located on the 2nd floor of the  [facility]. yes    2. You must have a responsible adult to drive you to the hospital, stay at the hospital during your procedure and drive you home. If they leave your procedure will not be started (no exceptions). yes    3. Do not have anything to eat or drink (including water, gum, mints, coffee, and juice) after midnight. This does not apply to the medications you were instructed to take by your physician. yesIf you are currently taking Plavix, Coumadin, Aspirin, or other blood-thinning agents, contact your physician for special instructions. Yes, last dose 12/1/2018    4. If you are having a procedure that requires bowel prep: We recommend that you have only clear liquids the day before your procedure and begin your bowel prep by 5:00 pm.  You may continue to drink clear liquids until midnight. If for any reason you are not able to complete your prep please notify your physician immediately. yes    5. Have a list of all current medications, including vitamins, herbal supplements and any other over the counter medications. yes    6. If you wear glasses, contacts, dentures and/or hearing aids, they may be removed prior to procedure, please bring a case to store them in. yes    7. You should understand that if you do not follow these instructions your procedure may be cancelled. If your physical condition changes (I.e. fever, cold or flu) please contact your doctor as soon as possible. 8. It is important that you be on time.   If for any reason you are unable to keep your appointment please call [phone] the day of or your physicians office prior to your scheduled procedure

## 2018-12-04 ENCOUNTER — HOSPITAL ENCOUNTER (OUTPATIENT)
Age: 63
Setting detail: OUTPATIENT SURGERY
Discharge: HOME | End: 2018-12-04
Attending: SPECIALIST | Admitting: SPECIALIST
Payer: COMMERCIAL

## 2018-12-04 ENCOUNTER — ANESTHESIA (OUTPATIENT)
Dept: ENDOSCOPY | Age: 63
End: 2018-12-04
Payer: COMMERCIAL

## 2018-12-04 ENCOUNTER — ANESTHESIA EVENT (OUTPATIENT)
Dept: ENDOSCOPY | Age: 63
End: 2018-12-04
Payer: COMMERCIAL

## 2018-12-04 VITALS
OXYGEN SATURATION: 100 % | DIASTOLIC BLOOD PRESSURE: 57 MMHG | BODY MASS INDEX: 43.4 KG/M2 | SYSTOLIC BLOOD PRESSURE: 121 MMHG | HEART RATE: 60 BPM | WEIGHT: 293 LBS | TEMPERATURE: 97.7 F | HEIGHT: 69 IN | RESPIRATION RATE: 16 BRPM

## 2018-12-04 LAB
GLUCOSE BLD STRIP.AUTO-MCNC: 93 MG/DL (ref 65–100)
SERVICE CMNT-IMP: NORMAL

## 2018-12-04 PROCEDURE — 76060000031 HC ANESTHESIA FIRST 0.5 HR: Performed by: SPECIALIST

## 2018-12-04 PROCEDURE — 76040000019: Performed by: SPECIALIST

## 2018-12-04 PROCEDURE — 74011250636 HC RX REV CODE- 250/636: Performed by: PHYSICIAN ASSISTANT

## 2018-12-04 PROCEDURE — 74011250636 HC RX REV CODE- 250/636

## 2018-12-04 PROCEDURE — 88305 TISSUE EXAM BY PATHOLOGIST: CPT

## 2018-12-04 PROCEDURE — 82962 GLUCOSE BLOOD TEST: CPT

## 2018-12-04 PROCEDURE — 77030013992 HC SNR POLYP ENDOSC BSC -B: Performed by: SPECIALIST

## 2018-12-04 RX ORDER — ATROPINE SULFATE 0.1 MG/ML
0.5 INJECTION INTRAVENOUS
Status: DISCONTINUED | OUTPATIENT
Start: 2018-12-04 | End: 2018-12-04 | Stop reason: HOSPADM

## 2018-12-04 RX ORDER — NALOXONE HYDROCHLORIDE 0.4 MG/ML
0.4 INJECTION, SOLUTION INTRAMUSCULAR; INTRAVENOUS; SUBCUTANEOUS
Status: DISCONTINUED | OUTPATIENT
Start: 2018-12-04 | End: 2018-12-04 | Stop reason: HOSPADM

## 2018-12-04 RX ORDER — DEXTROMETHORPHAN/PSEUDOEPHED 2.5-7.5/.8
1.2 DROPS ORAL
Status: DISCONTINUED | OUTPATIENT
Start: 2018-12-04 | End: 2018-12-04 | Stop reason: HOSPADM

## 2018-12-04 RX ORDER — SODIUM CHLORIDE 9 MG/ML
50 INJECTION, SOLUTION INTRAVENOUS CONTINUOUS
Status: DISCONTINUED | OUTPATIENT
Start: 2018-12-04 | End: 2018-12-04 | Stop reason: HOSPADM

## 2018-12-04 RX ORDER — PROPOFOL 10 MG/ML
INJECTION, EMULSION INTRAVENOUS
Status: DISCONTINUED | OUTPATIENT
Start: 2018-12-04 | End: 2018-12-04 | Stop reason: HOSPADM

## 2018-12-04 RX ORDER — MIDAZOLAM HYDROCHLORIDE 1 MG/ML
.25-5 INJECTION, SOLUTION INTRAMUSCULAR; INTRAVENOUS AS NEEDED
Status: DISCONTINUED | OUTPATIENT
Start: 2018-12-04 | End: 2018-12-04 | Stop reason: HOSPADM

## 2018-12-04 RX ORDER — LIDOCAINE HYDROCHLORIDE 20 MG/ML
INJECTION, SOLUTION EPIDURAL; INFILTRATION; INTRACAUDAL; PERINEURAL AS NEEDED
Status: DISCONTINUED | OUTPATIENT
Start: 2018-12-04 | End: 2018-12-04 | Stop reason: HOSPADM

## 2018-12-04 RX ORDER — FLUMAZENIL 0.1 MG/ML
0.2 INJECTION INTRAVENOUS
Status: DISCONTINUED | OUTPATIENT
Start: 2018-12-04 | End: 2018-12-04 | Stop reason: HOSPADM

## 2018-12-04 RX ORDER — FENTANYL CITRATE 50 UG/ML
25 INJECTION, SOLUTION INTRAMUSCULAR; INTRAVENOUS AS NEEDED
Status: DISCONTINUED | OUTPATIENT
Start: 2018-12-04 | End: 2018-12-04 | Stop reason: HOSPADM

## 2018-12-04 RX ORDER — PROPOFOL 10 MG/ML
INJECTION, EMULSION INTRAVENOUS AS NEEDED
Status: DISCONTINUED | OUTPATIENT
Start: 2018-12-04 | End: 2018-12-04 | Stop reason: HOSPADM

## 2018-12-04 RX ORDER — EPINEPHRINE 0.1 MG/ML
1 INJECTION INTRACARDIAC; INTRAVENOUS
Status: DISCONTINUED | OUTPATIENT
Start: 2018-12-04 | End: 2018-12-04 | Stop reason: HOSPADM

## 2018-12-04 RX ADMIN — PROPOFOL 100 MCG/KG/MIN: 10 INJECTION, EMULSION INTRAVENOUS at 07:33

## 2018-12-04 RX ADMIN — PROPOFOL 100 MG: 10 INJECTION, EMULSION INTRAVENOUS at 07:33

## 2018-12-04 RX ADMIN — LIDOCAINE HYDROCHLORIDE 30 MG: 20 INJECTION, SOLUTION EPIDURAL; INFILTRATION; INTRACAUDAL; PERINEURAL at 07:33

## 2018-12-04 RX ADMIN — PROPOFOL 100 MG: 10 INJECTION, EMULSION INTRAVENOUS at 07:37

## 2018-12-04 RX ADMIN — SODIUM CHLORIDE 50 ML/HR: 9 INJECTION, SOLUTION INTRAVENOUS at 06:54

## 2018-12-04 NOTE — PROGRESS NOTES
Discharge instructions given bedside with patient and son, Carey Guadarrama in to speak to patient and son

## 2018-12-04 NOTE — INTERVAL H&P NOTE
Pre-Endoscopy H&P Update  Chief complaint/HPI/ROS:  The indication for the procedure, the patient's history and the patient's current medications are reviewed prior to the procedure and that data is reported on the H&P to which this document is attached. Any significant complaints with regard to organ systems will be noted, and if not mentioned then a review of systems is not contributory. Past Medical History:   Diagnosis Date    Atrial fibrillation (HCC)     Chronic renal failure     Diabetes ([address]     Hearing loss     Hypertension     Migraine     Morbid obesity (HCC)     Osteoarthritis     Sleep apnea     no CPAP    Thyroid disease       Past Surgical History:   Procedure Laterality Date    CARDIAC SURG PROCEDURE UNLIST      cardioversion 2018    CARDIAC SURG PROCEDURE UNLIST      ablation,     HX DILATION AND CURETTAGE      HX HYSTERECTOMY       Social   Social History     Tobacco Use    Smoking status: Former Smoker     Years: 1.00     Types: Cigarettes    Smokeless tobacco: Never Used   Substance Use Topics    Alcohol use: No      Family History   Problem Relation Age of Onset    Heart Disease Mother     Hypertension Mother     Diabetes Mother       Allergies   Allergen Reactions    Ibuprofen Hives      Prior to Admission Medications   Prescriptions Last Dose Informant Patient Reported? Taking? SITagliptin (JANUVIA) 100 mg tablet 12/3/2018 at am  No Yes   Sig: Take 1 Tab by mouth daily. acetaminophen (TYLENOL EXTRA STRENGTH PO) 2018 at Unknown time  Yes Yes   Sig: Take  by mouth. amLODIPine (NORVASC) 10 mg tablet 12/3/2018 at am  No Yes   Sig: Take 1 Tab by mouth daily. atorvastatin (LIPITOR) 10 mg tablet 12/3/2018 at am  No Yes   Sig: Take 1 Tab by mouth daily. celecoxib (CELEBREX) 100 mg capsule 12/3/2018 at am  No Yes   Sig: Take 1 Cap by mouth two (2) times a day.    cyanocobalamin (VITAMIN B-12) 1,000 mcg/mL injection 2018  No No   Si mL by IntraMUSCular route every seven (7) days. Weekly for one month then monthly thereafter. dulaglutide (TRULICITY) 1.5 XA/0.0 mL sub-q pen 2018  No No   Si.5 mL by SubCUTAneous route every seven (7) days. hydroCHLOROthiazide (HYDRODIURIL) 25 mg tablet 12/3/2018 at am  No Yes   Sig: Take 1 Tab by mouth daily. levothyroxine (SYNTHROID) 137 mcg tablet 12/3/2018 at am  No Yes   Sig: Take 137 mcg by mouth Daily (before breakfast). losartan (COZAAR) 100 mg tablet 12/3/2018 at am  No Yes   Sig: Take 1 Tab by mouth daily. metoprolol succinate (TOPROL-XL) 50 mg XL tablet 12/3/2018 at 1100  No Yes   Sig: Take 1 Tab by mouth daily. raNITIdine (ZANTAC) 150 mg tablet 2018  No No   Sig: Take 1 Tab by mouth two (2) times a day. rivaroxaban (XARELTO) 20 mg tab tablet 2018 at am  No No   Sig: Take 1 Tab by mouth daily. Facility-Administered Medications: None       PHYSICAL EXAM:  The patient is examined immediately prior to the procedure. Visit Vitals  /46   Pulse (!) 58   Temp 98.1 °F (36.7 °C)   Resp 17   Ht 5' 8.5\" (1.74 m)   Wt 156.5 kg (345 lb)   SpO2 97%   Breastfeeding? No   BMI 51.69 kg/m²     Gen: Appears comfortable, no distress. Pulm: comfortable respirations with no abnormal audible breath sounds  HEART: well perfused, no abnormal audible heart sounds  GI: abdomen flat. PLAN:  Informed consent discussion held, patient afforded an opportunity to ask questions and all questions answered. After being advised of the risks, benefits, and alternatives, the patient requested that we proceed and indicated so on a written consent form. Will proceed with procedure as planned.   Di Boo MD

## 2018-12-04 NOTE — PERIOP NOTES
Report from Elisa Malik CRNA, see anesthesia record. ABD remains semi-soft and non-tender post procedure. Pt has no complaints at this time and tolerated the procedure well. Endoscope was pre-cleaned at bedside immediately following procedure by Manual Sydney.

## 2018-12-04 NOTE — PROCEDURES
[address] [facility] DANAE Lambert MD  [phone]      2018    Colonoscopy Procedure Note  Henri Lopez  :  1955  [place] Medical Record Number: [id number]    Indications:     Screening colonoscopy  PCP:  Gennaro Jiménez MD  Anesthesia/Sedation: Conscious Sedation/Moderate Sedation/GETA, see notes  Endoscopist:  Dr. Alyse Vasquez  Complications:  None  Estimated Blood Loss:  None    Permit:  The indications, risks, benefits and alternatives were reviewed with the patient or their decision maker who was provided an opportunity to ask questions and all questions were answered. The specific risks of colonoscopy with conscious sedation were reviewed, including but not limited to anesthetic complication, bleeding, adverse drug reaction, missed lesion, infection, IV site reactions, and intestinal perforation which would lead to the need for surgical repair. Alternatives to colonoscopy including radiographic imaging, observation without testing, or laboratory testing were reviewed including the limitations of those alternatives. After considering the options and having all their questions answered, the patient or their decision maker provided both verbal and written consent to proceed. Procedure in Detail:  After obtaining informed consent, positioning of the patient in the left lateral decubitus position, and conduction of a pre-procedure pause or \"time out\" the endoscope was introduced into the anus and advanced to the cecum, which was identified by the ileocecal valve and appendiceal orifice. The quality of the colonic preparation was good. A careful inspection was made as the colonoscope was withdrawn, findings and interventions are described below. Findings:   5mm polyp in the cecum taken with cold snare and all samples retrieved.   There is diverticulosis in the sigmoid colon without complications such as bleeding, inflammatory change, or luminal narrowing. Specimens:    See above    Complications:   None; patient tolerated the procedure well. Impression:  Polyp and diverticulosis    Recommendations:     - Await pathology. Thank you for entrusting me with this patient's care. Please do not hesitate to contact me with any questions or if I can be of assistance with any of your other patients' GI needs.     Signed By: Dee Dee Chaudhary MD                        December 4, 2018      Surgical assistant none

## 2018-12-04 NOTE — ANESTHESIA PREPROCEDURE EVALUATION
Anesthetic History Review of Systems / Medical History Patient summary reviewed and nursing notes reviewed Pulmonary Sleep apnea: No treatment Shortness of breath Neuro/Psych Comments: Chronic insomnia Anxiety/depression Cardiovascular Hypertension Dysrhythmias : atrial fibrillation Exercise tolerance: <4 METS 
  
GI/Hepatic/Renal 
  
 
 
 
 
 
 Endo/Other Diabetes: well controlled, type 2 Hypothyroidism: well controlled Morbid obesity and arthritis Other Findings Physical Exam 
 
Airway Mallampati: III Neck ROM: normal range of motion Mouth opening: Normal 
 
 Cardiovascular Rhythm: regular Rate: normal 
 
 
 
 Dental 
No notable dental hx Pulmonary Breath sounds clear to auscultation Abdominal 
 
 
 
 Other Findings Anesthetic Plan ASA: 3 Anesthesia type: MAC Anesthetic plan and risks discussed with: Patient Informed consent obtained.

## 2018-12-04 NOTE — ROUTINE PROCESS
Renee Gibbs  1955  [id number]    Situation:  Verbal report received from: Don Gomez RN  Procedure: Procedure(s):  COLONOSCOPY  ENDOSCOPIC POLYPECTOMY    Background:    Preoperative diagnosis: SCREENING COLONOSCOPY (SCREENING FOR COLONIC NEOPLASIA), LONG TERM (CURRENT) USE OF ANTICOAGULANTS, OBESITY UNSPECIFIED  Postoperative diagnosis: Diverticulosis  Cecum Polyp    :  Dr. Scot Stacy  Assistant(s): Endoscopy RN-1: Aide Patel RN; Juwan Weller RN    Specimens:   ID Type Source Tests Collected by Time Destination   1 : Cecum Polyp Preservative   Varun Greenfield MD 12/4/2018 5010 Pathology     H. Pylori  no    Assessment:  Intra-procedure medications     Anesthesia gave intra-procedure sedation and medications, see anesthesia flow sheet yes    Intravenous fluids: NS@ KVO     Vital signs stable     Abdominal assessment: round and soft     Recommendation:  Discharge patient per MD order.   Family or Friend   Permission to share finding with family or friend yes

## 2018-12-04 NOTE — ANESTHESIA POSTPROCEDURE EVALUATION
Procedure(s): 
COLONOSCOPY 
ENDOSCOPIC POLYPECTOMY. Anesthesia Post Evaluation Multimodal analgesia: multimodal analgesia not used between 6 hours prior to anesthesia start to PACU discharge Patient location during evaluation: PACU Patient participation: complete - patient participated Level of consciousness: awake Pain score: 0 Pain management: adequate Airway patency: patent Anesthetic complications: no 
Cardiovascular status: acceptable, blood pressure returned to baseline and hemodynamically stable Respiratory status: acceptable Hydration status: acceptable Post anesthesia nausea and vomiting:  none Visit Vitals /50 Pulse 63 Temp 36.5 °C (97.7 °F) Resp 14 Ht 5' 8.5\" (1.74 m) Wt 156.5 kg (345 lb) SpO2 100% Breastfeeding? No  
BMI 51.69 kg/m²

## 2018-12-04 NOTE — DISCHARGE INSTRUCTIONS
[address] [facility] DANAE Haddad MD  [phone]      December 4, 2018    Fady Saldana  YOB: 1955    COLONOSCOPY DISCHARGE INSTRUCTIONS    If there is redness at IV site you should apply warm compress to area. If redness or soreness persist contact Dr. America Haddad' or your primary care doctor. There may be a slight amount of blood passed from the rectum. Gaseous discomfort may develop, but walking, belching will help relieve this. You may not operate a vehicle for 12 hours  You may not operate machinery or dangerous appliances for rest of today  You may not drink alcoholic beverages for 12 hours  Avoid making any critical decisions for 24 hours    DIET:  You may resume your normal diet, but some patients find that heavy or large meals may lead to indigestion or vomiting. I suggest a light meal as first food intake. MEDICATIONS:  The use of some over-the-counter pain medication may lead to bleeding after colon biopsies or polyp removal.  Tylenol (also called acetaminophen) is safe to take even if you have had colonoscopy with polyp removal.  Based on the procedure you had today you may not safely take aspirin or aspirin-like products for the next ten (10) days. Remember that Tylenol (also called acetaminophen) is safe to take after colonoscopy even if you have had biopsies or polyps removed. ACTIVITY:  You may resume your normal household activities, but it is recommended that you spend the remainder of the day resting -  avoid any strenuous activity.     CALL DR. Clay Novak' OFFICE IF:  Increasing pain, nausea, vomiting  Abdominal distension (swelling)  Significant new or increased bleeding (oral or rectal)  Fever/Chills  Chest pain/shortness of breath                       Additional instructions:   No aspirin 10 days  Hold xarelto today and restart tomorrow if no bleeding in that interval  We found one small polyp and removed that  I'll contact you with polyp results by letter in about a week     It was an honor to be your doctor today. Please let me or my office staff know if you have any feedback about today's procedure. Kenna Singleton MD    Colonoscopy saves lives, and can prevent colon cancer. Everyone aged 48 or older needs colonoscopy.   Tell your family and friends: get the test!

## 2018-12-17 ENCOUNTER — OFFICE VISIT (OUTPATIENT)
Dept: FAMILY MEDICINE CLINIC | Age: 63
End: 2018-12-17

## 2018-12-17 VITALS
WEIGHT: 293 LBS | RESPIRATION RATE: 18 BRPM | TEMPERATURE: 98.3 F | DIASTOLIC BLOOD PRESSURE: 56 MMHG | HEIGHT: 68 IN | HEART RATE: 52 BPM | SYSTOLIC BLOOD PRESSURE: 118 MMHG | OXYGEN SATURATION: 94 % | BODY MASS INDEX: 44.41 KG/M2

## 2018-12-17 DIAGNOSIS — M54.2 NECK PAIN: Primary | ICD-10-CM

## 2018-12-17 DIAGNOSIS — E53.8 VITAMIN B 12 DEFICIENCY: ICD-10-CM

## 2018-12-17 RX ORDER — POLYETHYLENE GLYCOL 3350, SODIUM CHLORIDE, SODIUM BICARBONATE, POTASSIUM CHLORIDE 420; 11.2; 5.72; 1.48 G/4L; G/4L; G/4L; G/4L
POWDER, FOR SOLUTION ORAL
COMMUNITY
Start: 2018-12-01 | End: 2019-05-15

## 2018-12-17 RX ORDER — FLECAINIDE ACETATE 50 MG/1
50 TABLET ORAL 2 TIMES DAILY
COMMUNITY
Start: 2018-12-06 | End: 2019-10-21 | Stop reason: ALTCHOICE

## 2018-12-17 RX ORDER — CYANOCOBALAMIN 1000 UG/ML
1000 INJECTION, SOLUTION INTRAMUSCULAR; SUBCUTANEOUS ONCE
Qty: 1 ML | Refills: 0 | Status: SHIPPED | COMMUNITY
Start: 2018-12-17 | End: 2018-12-17

## 2018-12-17 RX ORDER — CYCLOBENZAPRINE HCL 10 MG
10 TABLET ORAL
Qty: 30 TAB | Refills: 0 | Status: SHIPPED | OUTPATIENT
Start: 2018-12-17 | End: 2019-05-15

## 2018-12-17 NOTE — PATIENT INSTRUCTIONS
Neck: Exercises  Your Care Instructions  Here are some examples of typical rehabilitation exercises for your condition. Start each exercise slowly. Ease off the exercise if you start to have pain. Your doctor or physical therapist will tell you when you can start these exercises and which ones will work best for you. How to do the exercises  Neck stretch    1. This stretch works best if you keep your shoulder down as you lean away from it. To help you remember to do this, start by relaxing your shoulders and lightly holding on to your thighs or your chair. 2. Tilt your head toward your shoulder and hold for 15 to 30 seconds. Let the weight of your head stretch your muscles. 3. If you would like a little added stretch, use your hand to gently and steadily pull your head toward your shoulder. For example, keeping your right shoulder down, lean your head to the left. 4. Repeat 2 to 4 times toward each shoulder. Diagonal neck stretch    1. Turn your head slightly toward the direction you will be stretching, and tilt your head diagonally toward your chest and hold for 15 to 30 seconds. 2. If you would like a little added stretch, use your hand to gently and steadily pull your head forward on the diagonal.  3. Repeat 2 to 4 times toward each side. Dorsal glide stretch    1. Sit or stand tall and look straight ahead. 2. Slowly tuck your chin as you glide your head backward over your body  3. Hold for a count of 6, and then relax for up to 10 seconds. 4. Repeat 8 to 12 times. Chest and shoulder stretch    1. Sit or stand tall and glide your head backward as in the dorsal glide stretch. 2. Raise both arms so that your hands are next to your ears. 3. Take a deep breath, and as you breathe out, lower your elbows down and behind your back. You will feel your shoulder blades slide down and together, and at the same time you will feel a stretch across your chest and the front of your shoulders.   4. Hold for about 6 seconds, and then relax for up to 10 seconds. 5. Repeat 8 to 12 times. Strengthening: Hands on head    1. Move your head backward, forward, and side to side against gentle pressure from your hands, holding each position for about 6 seconds. 2. Repeat 8 to 12 times. Follow-up care is a key part of your treatment and safety. Be sure to make and go to all appointments, and call your doctor if you are having problems. It's also a good idea to know your test results and keep a list of the medicines you take. Where can you learn more? Go to http://jayda-nicol.info/. Enter P975 in the search box to learn more about \"Neck: Exercises. \"  Current as of: November 29, 2017  Content Version: 11.8  © 0316-7662 [employer]. Care instructions adapted under license by [employer] (which disclaims liability or warranty for this information). If you have questions about a medical condition or this instruction, always ask your healthcare professional. [address] any warranty or liability for your use of this information.

## 2018-12-17 NOTE — PROGRESS NOTES
Subjective: Norris Henson is an 61 y.o. female who presents with neck pain. Patient presents neck pain x 2 days. No precipitating event. No trauma or fall. She states that it started to hurt when she got out of bed. Thinks she may have turned the wrong way or something. Rates the pain 9/10. Pain is located along left trapezius with some radiation into the base of her skull. Described as a constant, dull pain that turns to a shooting pain with movement. Declines radiation of pain, numbness or tingling in her arms. Has tried ice and heat with minimal relief. Aggravating factors during her head to the left. Taking tylenol with minimal relief. Something similar happened several months ago, but symptoms improved on their own. Patient states that she had a steroid pack. Allergies- reviewed: Allergies   Allergen Reactions    Ibuprofen Hives         Medications- reviewed:   Current Outpatient Medications   Medication Sig    flecainide (TAMBOCOR) 50 mg tablet Take 50 mg by mouth two (2) times a day.  cyclobenzaprine (FLEXERIL) 10 mg tablet Take 1 Tab by mouth three (3) times daily as needed for Muscle Spasm(s).  cyanocobalamin (VITAMIN B-12) 1,000 mcg/mL injection 1 mL by IntraMUSCular route once for 1 dose.  SITagliptin (JANUVIA) 100 mg tablet Take 1 Tab by mouth daily.  dulaglutide (TRULICITY) 1.5 UW/7.9 mL sub-q pen 0.5 mL by SubCUTAneous route every seven (7) days.  cyanocobalamin (VITAMIN B-12) 1,000 mcg/mL injection 1 mL by IntraMUSCular route every seven (7) days. Weekly for one month then monthly thereafter.  acetaminophen (TYLENOL EXTRA STRENGTH PO) Take  by mouth.  rivaroxaban (XARELTO) 20 mg tab tablet Take 1 Tab by mouth daily.  amLODIPine (NORVASC) 10 mg tablet Take 1 Tab by mouth daily.  atorvastatin (LIPITOR) 10 mg tablet Take 1 Tab by mouth daily.  celecoxib (CELEBREX) 100 mg capsule Take 1 Cap by mouth two (2) times a day.     hydroCHLOROthiazide (HYDRODIURIL) 25 mg tablet Take 1 Tab by mouth daily.  levothyroxine (SYNTHROID) 137 mcg tablet Take 137 mcg by mouth Daily (before breakfast).  losartan (COZAAR) 100 mg tablet Take 1 Tab by mouth daily.  metoprolol succinate (TOPROL-XL) 50 mg XL tablet Take 1 Tab by mouth daily.  raNITIdine (ZANTAC) 150 mg tablet Take 1 Tab by mouth two (2) times a day.  peg-electrolyte soln (NULYTELY) 420 gram solution      No current facility-administered medications for this visit.           Past Medical History- reviewed:  Past Medical History:   Diagnosis Date    Atrial fibrillation ([address]     Chronic renal failure     [facility]     Hearing loss     Hypertension     Migraine     Morbid obesity ([facility]     Osteoarthritis     Sleep apnea     no CPAP    Thyroid disease          Past Surgical History- reviewed   Past Surgical History:   Procedure Laterality Date    CARDIAC SURG PROCEDURE UNLIST      cardioversion 11/16/2018    CARDIAC SURG PROCEDURE UNLIST      ablation, 2016    COLONOSCOPY N/A 12/4/2018    COLONOSCOPY performed by Tasia Kaur [facility] at [facility] HX COLONOSCOPY  12/11/2018    normal, repeat 5 yrs     HX DILATION AND CURETTAGE      HX HYSTERECTOMY           Social History- reviewed:  Social History     Socioeconomic History    Marital status:      Spouse name: Not on file    Number of children: Not on file    Years of education: Not on file    Highest education level: Not on file   Social Needs    Financial resource strain: Not on file    Food insecurity - worry: Not on file    Food insecurity - inability: Not on file   [email or web address] needs - medical: Not on file   [employer] needs - non-medical: Not on file   Occupational History    Not on file   Tobacco Use    Smoking status: Former Smoker     Years: 1.00     Types: Cigarettes    Smokeless tobacco: Never Used   Substance and Sexual Activity    Alcohol use: No    Drug use: No    Sexual activity: Not on file Other Topics Concern    Not on file   Social History Narrative    Not on file       Review of Systems  Numbness in upper extremities? no  Tingling in upper extremities? no  Paresthesias in upper extremities? no   Weakness in upper extremities? no  Diminished  strength in upper extremities? no   Lack of coordination in upper extremities? no      Objective:     Visit Vitals  /56 (BP 1 Location: Left arm, BP Patient Position: Sitting)   Pulse (!) 52   Temp 98.3 °F (36.8 °C) (Oral)   Resp 18   Ht 5' 8\" (1.727 m)   Wt 334 lb (151.5 kg)   SpO2 94%   BMI 50.78 kg/m²       General:  No acute distress, alert, cooperative  Skin:  No rash. Eyes:  Sclera and conjunctiva clear. Lungs:  CTAB. No w/r/r/c.  Cardiovascular:  Irregularly irregular. No m/r/g. Upper extremities:  Radial pulses 2+ b/l  Musculoskeletal: Posture: Normal                        Spine deformity: None            Palpation:                C1 - T1 tenderness: None                Trapezious tenderness: left                         Cervical spine:     Flexion: Normal     Extension: Decreased     Lateral bending: Decreased             Upper Extremities: FROM bilaterally  Neurologic:  Speech intact, face symmetrical, able to shrug shoulders against  resistance                             Strength (0-5/5):      :     Left: 5/5  Right: 5/5      Wrist Extension:   Left: 5/5  Right: 5/5      Wrist Flexion:  Left: 5/5  Right: 5/5      Elbow Flexion:  Left: 5/5  Right: 5/5      Elbow Extension:   Left: 5/5  Right: 5/5      Shoulder Flexion:   Left: 5/5  Right: 5/5      Shoulder Abduction:   Left: 5/5  Right: 5/5                 Shouder Ext Rotation:  Left: 5/5  Right: 5/5      Shoulder Int Rotation:  Left: 5/5  Right: 5/5                 Sensation: Intact, no deficits in the upper ext bilaterally. Special test:      Spurlings: Left: Negative Right: Negative        Imaging:   None    Assessment:       ICD-10-CM ICD-9-CM    1.  Neck pain M54.2 723.1 cyclobenzaprine (FLEXERIL) 10 mg tablet   2. Vitamin B 12 deficiency E53.8 266.2 CO THER/PROPH/DIAG INJECTION, SUBCUT/IM      cyanocobalamin (VITAMIN B-12) 1,000 mcg/mL injection     Neck pain  Neck pain likely 2/2 to trapezius muscle spasms. No specific trapezius trigger points. Also, having cervical paraspinal muscle spasms as well. No neurological red flags. Negative spurling's. Will treat with home exercises and Flerxeril. Patient has allergy to NSAIDs, so will continue with tylenol. No trauma or precipitating event, so XR not warranted at this time. Vit B12 Deficiency  Vitamin B12 injection given to patient. Plan:     - Home Exercise Program per handout  - Ice 15 minutes, three times a day for 48 hours  - Tylenol 500 mg every 4 hours as needed for pain  - Flexeril as need up to 3 times per day. Do not drive or operate heavy machinery while taking Flexeril. I have discussed the diagnosis with the patient and the intended plan as seen in the above orders. The patient has received an after-visit summary and questions were answered concerning future plans. I have discussed medication side effects and warnings with the patient as well. The patient agrees and understands above plan. Follow-up Disposition:  Return in about 6 weeks (around 1/28/2019) for Neck pain.         Ren Koehler, DO

## 2018-12-17 NOTE — PROGRESS NOTES
Identified pt with two pt identifiers(name and ). Chief Complaint   Patient presents with    Neck Pain     patient states she rehurt her neck about 2 day ago,         Health Maintenance Due   Topic    Hepatitis C Screening     EYE EXAM RETINAL OR DILATED     PAP AKA CERVICAL CYTOLOGY     Shingrix Vaccine Age 50> (1 of 2)    FOBT Q 1 YEAR AGE 50-75     BREAST CANCER SCRN MAMMOGRAM     DTaP/Tdap/Td series (1 - Tdap)       Wt Readings from Last 3 Encounters:   18 334 lb (151.5 kg)   18 345 lb (156.5 kg)   11/15/18 346 lb (156.9 kg)     Temp Readings from Last 3 Encounters:   18 97.7 °F (36.5 °C)   11/15/18 98.8 °F (37.1 °C) (Oral)   18 98.6 °F (37 °C) (Oral)     BP Readings from Last 3 Encounters:   18 121/57   11/15/18 107/74   18 139/83     Pulse Readings from Last 3 Encounters:   18 60   11/15/18 81   18 78         Learning Assessment:  :     Learning Assessment 2018   PRIMARY LEARNER Patient   PRIMARY LANGUAGE ENGLISH   LEARNER PREFERENCE PRIMARY READING   ANSWERED BY    RELATIONSHIP SELF       Depression Screening:  :     No flowsheet data found. Fall Risk Assessment:  :     No flowsheet data found. Abuse Screening:  :     Abuse Screening Questionnaire 2018   Do you ever feel afraid of your partner? N   Are you in a relationship with someone who physically or mentally threatens you? N   Is it safe for you to go home? Y       Coordination of Care Questionnaire:  :     1) Have you been to an emergency room, urgent care clinic since your last visit? no   Hospitalized since your last visit? no             2) Have you seen or consulted any other health care providers outside of [address] [place] [address] since your last visit? yes Dr. Jimy Gonsalez doctor for colonoscopy, Dr. Smith  Cardiologist  (Include any pap smears or colon screenings in this section.)    3) Do you have an Advance Directive on file?  no  Are you interested in receiving information about Advance Directives? no    Patient is accompanied by self I have received verbal consent from Herbie Ballard to discuss any/all medical information while they are present in the room. Reviewed record in preparation for visit and have obtained necessary documentation. Medication reconciliation up to date and corrected with patient at this time.

## 2019-02-04 RX ORDER — AMLODIPINE BESYLATE 10 MG/1
10 TABLET ORAL DAILY
Qty: 90 TAB | Refills: 1 | Status: SHIPPED | OUTPATIENT
Start: 2019-02-04 | End: 2019-05-15 | Stop reason: SDUPTHER

## 2019-02-04 RX ORDER — LEVOTHYROXINE SODIUM 137 UG/1
137 TABLET ORAL
Qty: 90 TAB | Refills: 1 | Status: SHIPPED | OUTPATIENT
Start: 2019-02-04 | End: 2019-05-15 | Stop reason: SDUPTHER

## 2019-02-04 RX ORDER — METOPROLOL SUCCINATE 50 MG/1
50 TABLET, EXTENDED RELEASE ORAL DAILY
Qty: 90 TAB | Refills: 1 | Status: SHIPPED | OUTPATIENT
Start: 2019-02-04 | End: 2019-05-15

## 2019-02-04 RX ORDER — LOSARTAN POTASSIUM 100 MG/1
100 TABLET ORAL DAILY
Qty: 90 TAB | Refills: 1 | Status: SHIPPED | OUTPATIENT
Start: 2019-02-04 | End: 2019-05-15 | Stop reason: SDUPTHER

## 2019-02-04 RX ORDER — ATORVASTATIN CALCIUM 10 MG/1
10 TABLET, FILM COATED ORAL DAILY
Qty: 90 TAB | Refills: 1 | Status: SHIPPED | OUTPATIENT
Start: 2019-02-04 | End: 2019-05-15 | Stop reason: SDUPTHER

## 2019-02-05 ENCOUNTER — OFFICE VISIT (OUTPATIENT)
Dept: FAMILY MEDICINE CLINIC | Age: 64
End: 2019-02-05

## 2019-02-05 VITALS
RESPIRATION RATE: 20 BRPM | WEIGHT: 293 LBS | DIASTOLIC BLOOD PRESSURE: 65 MMHG | TEMPERATURE: 97.9 F | HEART RATE: 58 BPM | SYSTOLIC BLOOD PRESSURE: 121 MMHG | BODY MASS INDEX: 44.41 KG/M2 | HEIGHT: 68 IN | OXYGEN SATURATION: 96 %

## 2019-02-05 DIAGNOSIS — I48.20 CHRONIC ATRIAL FIBRILLATION (HCC): Primary | ICD-10-CM

## 2019-02-05 DIAGNOSIS — E53.8 B12 DEFICIENCY: ICD-10-CM

## 2019-02-05 DIAGNOSIS — E11.8 TYPE 2 DIABETES MELLITUS WITH COMPLICATION, WITHOUT LONG-TERM CURRENT USE OF INSULIN (HCC): ICD-10-CM

## 2019-02-05 NOTE — PROGRESS NOTES
1. Have you been to the ER, urgent care clinic since your last visit? Hospitalized since your last visit? No 
 
2. Have you seen or consulted any other health care providers outside of the [address] [place] [address] since your last visit? Include any pap smears or colon screening.  No

## 2019-02-05 NOTE — PROGRESS NOTES
Assessment and Plan: 1. Type 2 diabetes mellitus with complication, without long-term current use of insulin ([address] Tolerating the combination of trulicity and sitagliptin without problems or side effects 
- dulaglutide (TRULICITY) 1.5 AVILA/1.3 mL sub-q pen; 0.5 mL by SubCUTAneous route every seven (7) days. Dispense: 12 Pen; Refill: 1 
- CBC WITH AUTOMATED DIFF 
- HEMOGLOBIN A1C WITH EAG 
- METABOLIC PANEL, BASIC 
- VITAMIN B12 
- SITagliptin (JANUVIA) 100 mg tablet; Take 1 Tab by mouth daily. Dispense: 90 Tab; Refill: 1 Diet and carb restriction again discussed. She seems to understand this. 2. Chronic atrial fibrillation ([address] Rate controlled on anticoag 
- rivaroxaban (XARELTO) 20 mg tab tablet; Take 1 Tab by mouth daily. Dispense: 90 Tab; Refill: 1 3. B12 deficiency Will recheck and if repleted, go to PO trial for 6 months. Diagnosis and plan discussed with patient who verbillized understanding. Follow-up Disposition: 
Return in about 3 months (around 5/5/2019) for Diabetes follow up, Blood pressure follow up, Medication follow up. Basilia Chew Hpi:Yara Marquez is a 59 y.o. female presenting for Hypertension and Diabetes Needs refills of HTN and DM meds and here for FU and wants to discuss B12 History of Present Illness: 
Diabetes Mellitus Needs refills Trying to stick to diet Fasting sugars continue to look good No additional weight loss Last a1c 6.1 Atrial fibrillation Needs refill of xarelto Another EP procedure at end of last year B12 deficiency Has been on shots almost 6 months Wants to take PO since IM is expensive Presumed due to metformin previous use Review of Systems Respiratory: Negative for shortness of breath. Cardiovascular: Negative for chest pain, palpitations and leg swelling. Gastrointestinal: Negative for abdominal pain, constipation, diarrhea, heartburn and nausea. All other systems reviewed and are negative for a Comprehensive ROS (10+) Past Medical History:  
Diagnosis Date  Atrial fibrillation ([facility]  Chronic renal failure  Diabetes ([facility]  Hearing loss  Hypertension  Migraine  Morbid obesity ([facility]  Osteoarthritis  Sleep apnea   
 no CPAP  Thyroid disease Past Surgical History:  
Procedure Laterality Date  CARDIAC SURG PROCEDURE UNLIST    
 cardioversion 11/16/2018  CARDIAC SURG PROCEDURE UNLIST    
 ablation, 2016  COLONOSCOPY N/A 12/4/2018 COL[facility] HX COLONOSCOPY  12/11/2018  
 normal, repeat 5 yrs  HX DILATION AND CURETTAGE    
 HX HYSTERECTOMY Family History Problem Relation Age of Onset  Heart Disease Mother  Hypertension Mother  Diabetes Mother Social History Socioeconomic History  Marital status:  Spouse name: Not on file  Number of children: Not on file  Years of education: Not on file  Highest education level: Not on file Social Needs  Financial resource strain: Not on file  Food insecurity - worry: Not on file  Food insecurity - inability: Not on file  Transportation needs - medical: Not on file  Transportation needs - non-medical: Not on file Occupational History  Not on file Tobacco Use  Smoking status: Former Smoker Years: 1.00 Types: Cigarettes  Smokeless tobacco: Never Used Substance and Sexual Activity  Alcohol use: No  
 Drug use: No  
 Sexual activity: Not on file Other Topics Concern  Not on file Social History Narrative  Not on file Current Outpatient Medications Medication Sig Dispense Refill  dulaglutide (TRULICITY) 1.5 XN/8.3 mL sub-q pen 0.5 mL by SubCUTAneous route every seven (7) days. 12 Pen 1  
 rivaroxaban (XARELTO) 20 mg tab tablet Take 1 Tab by mouth daily.  90 Tab 1  
  SITagliptin (JANUVIA) 100 mg tablet Take 1 Tab by mouth daily. 90 Tab 1  
 atorvastatin (LIPITOR) 10 mg tablet Take 1 Tab by mouth daily. 90 Tab 1  
 losartan (COZAAR) 100 mg tablet Take 1 Tab by mouth daily. 90 Tab 1  
 amLODIPine (NORVASC) 10 mg tablet Take 1 Tab by mouth daily. 90 Tab 1  
 metoprolol succinate (TOPROL-XL) 50 mg XL tablet Take 1 Tab by mouth daily. 90 Tab 1  
 levothyroxine (SYNTHROID) 137 mcg tablet Take 137 mcg by mouth Daily (before breakfast). 90 Tab 1  cyclobenzaprine (FLEXERIL) 10 mg tablet Take 1 Tab by mouth three (3) times daily as needed for Muscle Spasm(s). 30 Tab 0  
 cyanocobalamin (VITAMIN B-12) 1,000 mcg/mL injection 1 mL by IntraMUSCular route every seven (7) days. Weekly for one month then monthly thereafter. 10 mL 0  
 celecoxib (CELEBREX) 100 mg capsule Take 1 Cap by mouth two (2) times a day. 180 Cap 1  
 hydroCHLOROthiazide (HYDRODIURIL) 25 mg tablet Take 1 Tab by mouth daily. 90 Tab 1  raNITIdine (ZANTAC) 150 mg tablet Take 1 Tab by mouth two (2) times a day. 180 Tab 1  
 flecainide (TAMBOCOR) 50 mg tablet Take 50 mg by mouth two (2) times a day.  peg-electrolyte soln (NULYTELY) 420 gram solution  acetaminophen (TYLENOL EXTRA STRENGTH PO) Take  by mouth. Allergies Allergen Reactions  Ibuprofen Hives Vitals:  
 02/05/19 1022 BP: 121/65 Pulse: (!) 58 Resp: 20 Temp: 97.9 °F (36.6 °C) TempSrc: Oral  
SpO2: 96% Weight: 335 lb (152 kg) Height: 5' 8\" (1.727 m) Body mass index is 50.94 kg/m². Objective General: Patient alert and oriented and in NAD Eyes: PER/EOMI, no conjunctival pallor or scleral icterus. Neck: No thyromegaly or cervical lymphadenopathy Cardiovascular: Heart has regular rate and rhythm, No murmurs, rubs or gallops. No edema Respiratory: Lungs are clear to auscultation bilaterally, no wheezing, rales or rhonchi, normal chest excursion and no increased work of breathing. Musculoskeletal: All four extremities present and functional.  
Skin: No rashes or lesions noted on exposed skin Neuro: AAOx3, normal gait and speech. No gross neurologic deficits. Psych: Appropriate mood and affect, no homicidal or suicidal ideation, no obsessions, delusions or hallucinations, normal psychomotor status.

## 2019-02-06 LAB
BASOPHILS # BLD AUTO: 0 X10E3/UL (ref 0–0.2)
BASOPHILS NFR BLD AUTO: 0 %
BUN SERPL-MCNC: 22 MG/DL (ref 8–27)
BUN/CREAT SERPL: 24 (ref 12–28)
CALCIUM SERPL-MCNC: 9.6 MG/DL (ref 8.7–10.3)
CHLORIDE SERPL-SCNC: 100 MMOL/L (ref 96–106)
CO2 SERPL-SCNC: 25 MMOL/L (ref 20–29)
CREAT SERPL-MCNC: 0.91 MG/DL (ref 0.57–1)
EOSINOPHIL # BLD AUTO: 0.2 X10E3/UL (ref 0–0.4)
EOSINOPHIL NFR BLD AUTO: 2 %
ERYTHROCYTE [DISTWIDTH] IN BLOOD BY AUTOMATED COUNT: 14.7 % (ref 12.3–15.4)
EST. AVERAGE GLUCOSE BLD GHB EST-MCNC: 137 MG/DL
GLUCOSE SERPL-MCNC: 106 MG/DL (ref 65–99)
HBA1C MFR BLD: 6.4 % (ref 4.8–5.6)
HCT VFR BLD AUTO: 41.5 % (ref 34–46.6)
HGB BLD-MCNC: 13.2 G/DL (ref 11.1–15.9)
IMM GRANULOCYTES # BLD AUTO: 0 X10E3/UL (ref 0–0.1)
IMM GRANULOCYTES NFR BLD AUTO: 0 %
LYMPHOCYTES # BLD AUTO: 2.1 X10E3/UL (ref 0.7–3.1)
LYMPHOCYTES NFR BLD AUTO: 26 %
MCH RBC QN AUTO: 27.1 PG (ref 26.6–33)
MCHC RBC AUTO-ENTMCNC: 31.8 G/DL (ref 31.5–35.7)
MCV RBC AUTO: 85 FL (ref 79–97)
MONOCYTES # BLD AUTO: 0.5 X10E3/UL (ref 0.1–0.9)
MONOCYTES NFR BLD AUTO: 6 %
NEUTROPHILS # BLD AUTO: 5.2 X10E3/UL (ref 1.4–7)
NEUTROPHILS NFR BLD AUTO: 66 %
PLATELET # BLD AUTO: 266 X10E3/UL (ref 150–379)
POTASSIUM SERPL-SCNC: 3.9 MMOL/L (ref 3.5–5.2)
RBC # BLD AUTO: 4.87 X10E6/UL (ref 3.77–5.28)
SODIUM SERPL-SCNC: 141 MMOL/L (ref 134–144)
WBC # BLD AUTO: 7.9 X10E3/UL (ref 3.4–10.8)

## 2019-02-06 NOTE — PROGRESS NOTES
Call patient  Diabetic control is very good. Trell Haddad Keep working on diet and take medicines religiously. See me in 4 months and as needed.  
RH

## 2019-02-07 NOTE — PROGRESS NOTES
Lab result letter mailed to patient stating following message None. Keep up the good work! 
 Diabetic control is very good. Raj Paige working on diet and take medicines religiously. See me in 4 months and as needed.

## 2019-04-29 RX ORDER — CELECOXIB 100 MG/1
100 CAPSULE ORAL 2 TIMES DAILY
Qty: 180 CAP | Refills: 1 | Status: SHIPPED | OUTPATIENT
Start: 2019-04-29 | End: 2019-05-15 | Stop reason: SDUPTHER

## 2019-05-15 ENCOUNTER — OFFICE VISIT (OUTPATIENT)
Dept: FAMILY MEDICINE CLINIC | Age: 64
End: 2019-05-15

## 2019-05-15 VITALS
SYSTOLIC BLOOD PRESSURE: 113 MMHG | TEMPERATURE: 97.7 F | WEIGHT: 293 LBS | RESPIRATION RATE: 22 BRPM | HEIGHT: 68 IN | DIASTOLIC BLOOD PRESSURE: 71 MMHG | OXYGEN SATURATION: 95 % | HEART RATE: 62 BPM | BODY MASS INDEX: 44.41 KG/M2

## 2019-05-15 DIAGNOSIS — E03.9 ACQUIRED HYPOTHYROIDISM: ICD-10-CM

## 2019-05-15 DIAGNOSIS — I48.20 CHRONIC ATRIAL FIBRILLATION (HCC): ICD-10-CM

## 2019-05-15 DIAGNOSIS — E78.00 HYPERCHOLESTEREMIA: ICD-10-CM

## 2019-05-15 DIAGNOSIS — M19.90 OSTEOARTHRITIS, UNSPECIFIED OSTEOARTHRITIS TYPE, UNSPECIFIED SITE: ICD-10-CM

## 2019-05-15 DIAGNOSIS — I10 ESSENTIAL HYPERTENSION: ICD-10-CM

## 2019-05-15 DIAGNOSIS — E11.8 TYPE 2 DIABETES MELLITUS WITH COMPLICATION, WITHOUT LONG-TERM CURRENT USE OF INSULIN (HCC): Primary | ICD-10-CM

## 2019-05-15 DIAGNOSIS — Z12.39 SCREENING FOR BREAST CANCER: ICD-10-CM

## 2019-05-15 RX ORDER — LEVOTHYROXINE SODIUM 137 UG/1
137 TABLET ORAL
Qty: 90 TAB | Refills: 1 | Status: SHIPPED | OUTPATIENT
Start: 2019-05-15 | End: 2019-10-21 | Stop reason: SDUPTHER

## 2019-05-15 RX ORDER — LOSARTAN POTASSIUM 100 MG/1
100 TABLET ORAL DAILY
Qty: 90 TAB | Refills: 1 | Status: SHIPPED | OUTPATIENT
Start: 2019-05-15 | End: 2019-10-21 | Stop reason: SDUPTHER

## 2019-05-15 RX ORDER — AMLODIPINE BESYLATE 10 MG/1
10 TABLET ORAL DAILY
Qty: 90 TAB | Refills: 1 | Status: SHIPPED | OUTPATIENT
Start: 2019-05-15 | End: 2019-10-21 | Stop reason: SDUPTHER

## 2019-05-15 RX ORDER — ATORVASTATIN CALCIUM 10 MG/1
10 TABLET, FILM COATED ORAL DAILY
Qty: 90 TAB | Refills: 1 | Status: SHIPPED | OUTPATIENT
Start: 2019-05-15 | End: 2019-10-21 | Stop reason: SDUPTHER

## 2019-05-15 RX ORDER — CELECOXIB 100 MG/1
100 CAPSULE ORAL 2 TIMES DAILY
Qty: 180 CAP | Refills: 1 | Status: SHIPPED | OUTPATIENT
Start: 2019-05-15 | End: 2019-09-13 | Stop reason: SDUPTHER

## 2019-05-15 RX ORDER — METOPROLOL SUCCINATE 25 MG/1
25 TABLET, EXTENDED RELEASE ORAL DAILY
Qty: 90 TAB | Refills: 1 | Status: SHIPPED | OUTPATIENT
Start: 2019-05-15 | End: 2019-05-16 | Stop reason: SDUPTHER

## 2019-05-15 RX ORDER — FUROSEMIDE 20 MG/1
20 TABLET ORAL DAILY
Qty: 90 TAB | Refills: 1 | Status: SHIPPED | OUTPATIENT
Start: 2019-05-15 | End: 2019-10-21 | Stop reason: SDUPTHER

## 2019-05-15 NOTE — PROGRESS NOTES
Identified pt with two pt identifiers(name and ). Reviewed record in preparation for visit and have obtained necessary documentation. All patient medications has been reviewed. Chief Complaint   Patient presents with    Diabetes    Medication Evaluation     Hydralzine (cause ankle and feet swelling was prescribed by cardiologist) not taking hctz        Health Maintenance Due   Topic    Hepatitis C Screening     EYE EXAM RETINAL OR DILATED     PAP AKA CERVICAL CYTOLOGY     Shingrix Vaccine Age 50> (1 of 2)    FOBT Q 1 YEAR AGE 50-75     BREAST CANCER SCRN MAMMOGRAM     DTaP/Tdap/Td series (1 - Tdap)     Eye exam: needs a new eye doctor. Mammogram: not done     Shingrix: 1/2 dose at [address]   Vitals:    05/15/19 0959   BP: 113/71   Pulse: 62   Resp: 22   Temp: 97.7 °F (36.5 °C)   TempSrc: Oral   SpO2: 95%   Weight: 339 lb (153.8 kg)   Height: 5' 8\" (1.727 m)   PainSc:   8   PainLoc: Generalized       Coordination of Care Questionnaire:   1) Have you been to an emergency room, urgent care, or hospitalized since your last visit?   no       2. Have seen or consulted any other health care provider since your last visit? NO    3) Do you have an Advanced Directive/ Living Will in place? NO  If yes, do we have a copy on file NO  If no, would you like information NO    Patient is accompanied by self I have received verbal consent from Yulisa Sandoval to discuss any/all medical information while they are present in the room.

## 2019-05-15 NOTE — PROGRESS NOTES
Assessment and Plan    1. Type 2 diabetes mellitus with complication, without long-term current use of insulin (HCC)  On Trulicity only  - HEMOGLOBIN A1C WITH EAG  - METABOLIC PANEL, COMPREHENSIVE  - dulaglutide (TRULICITY) 1.5 FD/1.4 mL sub-q pen; 0.5 mL by SubCUTAneous route every seven (7) days. Indications: type 2 diabetes mellitus  Dispense: 12 Pen; Refill: 1  - CBC WITH AUTOMATED DIFF  - MICROALBUMIN, UR, RAND W/ MICROALB/CREAT RATIO  -  DIABETES FOOT EXAM    2. Chronic atrial fibrillation (HCC)  Apparently in sinus today. On anticoagulation  - metoprolol succinate (TOPROL-XL) 25 mg XL tablet; Take 1 Tab by mouth daily. Dispense: 90 Tab; Refill: 1  - rivaroxaban (XARELTO) 20 mg tab tablet; Take 1 Tab by mouth daily. Dispense: 90 Tab; Refill: 1    3. Screening for breast cancer  ordered  - [facility] MAMMO BI SCREENING INCL CAD; Future    4. Essential hypertension  At goal, add different diurretic  - amLODIPine (NORVASC) 10 mg tablet; Take 1 Tab by mouth daily. Dispense: 90 Tab; Refill: 1  - losartan (COZAAR) 100 mg tablet; Take 1 Tab by mouth daily. Dispense: 90 Tab; Refill: 1  - furosemide (LASIX) 20 mg tablet; Take 1 Tab by mouth daily. Dispense: 90 Tab; Refill: 1    5. Hypercholesteremia  On statin  - atorvastatin (LIPITOR) 10 mg tablet; Take 1 Tab by mouth daily. Dispense: 90 Tab; Refill: 1    6. Acquired hypothyroidism  On replacement  - levothyroxine (SYNTHROID) 137 mcg tablet; Take 137 mcg by mouth Daily (before breakfast). Dispense: 90 Tab; Refill: 1    7. Osteoarthritis, unspecified osteoarthritis type, unspecified site  Discussed risks of celebrex with anticoagulation, rec: resume ranitidine and discussed melena  - celecoxib (CELEBREX) 100 mg capsule; Take 1 Cap by mouth two (2) times a day. Dispense: 180 Cap; Refill: 1      Follow-up and Dispositions    · Return in about 6 months (around 11/15/2019) for Diabetes follow up, Blood pressure follow up.           Diagnosis and plan discussed with patient who verbillized understanding. History of present illness: Price Angelucci is a 59 y.o. female presenting for Diabetes and Medication Evaluation (Hydralzine (cause ankle and feet swelling was prescribed by cardiologist) not taking hctz )    Hypertension  Complained to [facility] of frequent urination on HCTZ. Was getting up multiple times per night to urinate  Saw NP at [facility] and was taken off of diuretic HCTZ and hydralazine was added (per patient)  Subsequently had lots of edema formed so stopped hydralazine and edema lessened. Wonders now what to do. Metoprolol dose was decreased due to heart rate. Has had some dyspnea on exertion. Dyspneic walking in our tate today. Is again having nocturia    DM2  Stopped Januvia due to cost.  Now only on Trulicity. Trying to stick to diet. Potatoes a problems  Control unclear. Not checking sugars    Needs refills other meds  Taking consistently. Review of Systems   Constitutional: Negative for weight loss. Respiratory: Negative for cough and shortness of breath. Cardiovascular: Positive for palpitations and leg swelling. Negative for chest pain, orthopnea, claudication and PND. Gastrointestinal: Negative for abdominal pain, blood in stool and diarrhea. Genitourinary: Positive for frequency and urgency. Negative for dysuria and hematuria. Neurological: Negative for loss of consciousness. Psychiatric/Behavioral: Negative for depression.          Past Medical History:   Diagnosis Date    Atrial fibrillation ([address]     Chronic renal failure     Diabetes ([address]     Hearing loss     Hypertension     Migraine     Morbid obesity (HCC)     Osteoarthritis     Sleep apnea     no CPAP    Thyroid disease      Past Surgical History:   Procedure Laterality Date    CARDIAC SURG PROCEDURE UNLIST      cardioversion 11/16/2018    CARDIAC SURG PROCEDURE UNLIST      ablation, 2016    COLONOSCOPY N/A 12/4/2018    COLONOSCOPY performed by Anjel Barnhart MD at [address] [facility] HX COLONOSCOPY  12/11/2018    normal, repeat 5 yrs     HX DILATION AND CURETTAGE      HX HYSTERECTOMY       Family History   Problem Relation Age of Onset    Heart Disease Mother     Hypertension Mother     Diabetes Mother      Social History     Socioeconomic History    Marital status:      Spouse name: Not on file    Number of children: Not on file    Years of education: Not on file    Highest education level: Not on file   Occupational History    Not on file   Social Needs    Financial resource strain: Not on file    Food insecurity:     Worry: Not on file     Inability: Not on file    Transportation needs:     Medical: Not on file     Non-medical: Not on file   Tobacco Use    Smoking status: Former Smoker     Years: 1.00     Types: Cigarettes    Smokeless tobacco: Never Used   Substance and Sexual Activity    Alcohol use: No    Drug use: No    Sexual activity: Not on file   Lifestyle    Physical activity:     Days per week: Not on file     Minutes per session: Not on file    Stress: Not on file   Relationships    Social connections:     Talks on phone: Not on file     Gets together: Not on file     Attends Alevism service: Not on file     Active member of club or organization: Not on file     Attends meetings of clubs or organizations: Not on file     Relationship status: Not on file    Intimate partner violence:     Fear of current or ex partner: Not on file     Emotionally abused: Not on file     Physically abused: Not on file     Forced sexual activity: Not on file   Other Topics Concern    Not on file   Social History Narrative    Not on file         Current Outpatient Medications   Medication Sig Dispense Refill    metoprolol succinate (TOPROL-XL) 25 mg XL tablet Take 1 Tab by mouth daily. 90 Tab 1    amLODIPine (NORVASC) 10 mg tablet Take 1 Tab by mouth daily. 90 Tab 1    atorvastatin (LIPITOR) 10 mg tablet Take 1 Tab by mouth daily.  90 Tab 1    celecoxib (CELEBREX) 100 mg capsule Take 1 Cap by mouth two (2) times a day. 180 Cap 1    dulaglutide (TRULICITY) 1.5 XV/5.0 mL sub-q pen 0.5 mL by SubCUTAneous route every seven (7) days. Indications: type 2 diabetes mellitus 12 Pen 1    levothyroxine (SYNTHROID) 137 mcg tablet Take 137 mcg by mouth Daily (before breakfast). 90 Tab 1    losartan (COZAAR) 100 mg tablet Take 1 Tab by mouth daily. 90 Tab 1    rivaroxaban (XARELTO) 20 mg tab tablet Take 1 Tab by mouth daily. 90 Tab 1    furosemide (LASIX) 20 mg tablet Take 1 Tab by mouth daily. 90 Tab 1    acetaminophen (TYLENOL EXTRA STRENGTH PO) Take  by mouth.  flecainide (TAMBOCOR) 50 mg tablet Take 50 mg by mouth two (2) times a day. Allergies   Allergen Reactions    Ibuprofen Hives       Vitals:    05/15/19 0959   BP: 113/71   Pulse: 62   Resp: 22   Temp: 97.7 °F (36.5 °C)   TempSrc: Oral   SpO2: 95%   Weight: 339 lb (153.8 kg)   Height: 5' 8\" (1.727 m)     Body mass index is 51.54 kg/m². Objective  General: Patient alert and oriented and in NAD  Neck: No thyromegaly or cervical lymphadenopathy  Cardiovascular: Heart has regular rate and rhythm, No murmurs, rubs or gallops. TR edema  Respiratory: Lungs are clear to auscultation bilaterally, no wheezing, rales or rhonchi, normal chest excursion and no increased work of breathing. Musculoskeletal: All four extremities present and functional.   Skin: No rashes or lesions noted on exposed skin  Neuro: AAOx3, normal gait and speech. No gross neurologic deficits. Psych: Appropriate mood and affect, no homicidal or suicidal ideation, no obsessions, delusions or hallucinations, normal psychomotor status.     Diabetic Foot Exam:  Protective sensation is intact bilaterally. Pedal pulses are 2+ and normal bilaterally.   L foot skin inspection:  normal skin and soft tissue with no gross edema or evidence of acute injury or foot ulcer  R foot skin inspection:  skin and soft tissue appear normal with no significant edema or evidence of acute injury or foot ulcer

## 2019-05-16 DIAGNOSIS — I48.20 CHRONIC ATRIAL FIBRILLATION (HCC): ICD-10-CM

## 2019-05-16 LAB
ALBUMIN SERPL-MCNC: 4.3 G/DL (ref 3.6–4.8)
ALBUMIN/CREAT UR: 8.2 MG/G CREAT (ref 0–30)
ALBUMIN/GLOB SERPL: 1.4 {RATIO} (ref 1.2–2.2)
ALP SERPL-CCNC: 106 IU/L (ref 39–117)
ALT SERPL-CCNC: 9 IU/L (ref 0–32)
AST SERPL-CCNC: 13 IU/L (ref 0–40)
BASOPHILS # BLD AUTO: 0 X10E3/UL (ref 0–0.2)
BASOPHILS NFR BLD AUTO: 0 %
BILIRUB SERPL-MCNC: 0.7 MG/DL (ref 0–1.2)
BUN SERPL-MCNC: 19 MG/DL (ref 8–27)
BUN/CREAT SERPL: 19 (ref 12–28)
CALCIUM SERPL-MCNC: 10.1 MG/DL (ref 8.7–10.3)
CHLORIDE SERPL-SCNC: 98 MMOL/L (ref 96–106)
CO2 SERPL-SCNC: 24 MMOL/L (ref 20–29)
CREAT SERPL-MCNC: 1.01 MG/DL (ref 0.57–1)
CREAT UR-MCNC: 209.2 MG/DL
EOSINOPHIL # BLD AUTO: 0.3 X10E3/UL (ref 0–0.4)
EOSINOPHIL NFR BLD AUTO: 3 %
ERYTHROCYTE [DISTWIDTH] IN BLOOD BY AUTOMATED COUNT: 14.8 % (ref 12.3–15.4)
EST. AVERAGE GLUCOSE BLD GHB EST-MCNC: 134 MG/DL
GLOBULIN SER CALC-MCNC: 3.1 G/DL (ref 1.5–4.5)
GLUCOSE SERPL-MCNC: 104 MG/DL (ref 65–99)
HBA1C MFR BLD: 6.3 % (ref 4.8–5.6)
HCT VFR BLD AUTO: 41.6 % (ref 34–46.6)
HGB BLD-MCNC: 13.7 G/DL (ref 11.1–15.9)
IMM GRANULOCYTES # BLD AUTO: 0 X10E3/UL (ref 0–0.1)
IMM GRANULOCYTES NFR BLD AUTO: 0 %
LYMPHOCYTES # BLD AUTO: 2.4 X10E3/UL (ref 0.7–3.1)
LYMPHOCYTES NFR BLD AUTO: 25 %
MCH RBC QN AUTO: 27.5 PG (ref 26.6–33)
MCHC RBC AUTO-ENTMCNC: 32.9 G/DL (ref 31.5–35.7)
MCV RBC AUTO: 84 FL (ref 79–97)
MICROALBUMIN UR-MCNC: 17.1 UG/ML
MONOCYTES # BLD AUTO: 0.7 X10E3/UL (ref 0.1–0.9)
MONOCYTES NFR BLD AUTO: 7 %
NEUTROPHILS # BLD AUTO: 6.4 X10E3/UL (ref 1.4–7)
NEUTROPHILS NFR BLD AUTO: 65 %
PLATELET # BLD AUTO: 304 X10E3/UL (ref 150–379)
POTASSIUM SERPL-SCNC: 4.5 MMOL/L (ref 3.5–5.2)
PROT SERPL-MCNC: 7.4 G/DL (ref 6–8.5)
RBC # BLD AUTO: 4.98 X10E6/UL (ref 3.77–5.28)
SODIUM SERPL-SCNC: 140 MMOL/L (ref 134–144)
WBC # BLD AUTO: 9.9 X10E3/UL (ref 3.4–10.8)

## 2019-05-17 RX ORDER — METOPROLOL SUCCINATE 25 MG/1
25 TABLET, EXTENDED RELEASE ORAL DAILY
Qty: 90 TAB | Refills: 1 | Status: SHIPPED | OUTPATIENT
Start: 2019-05-17 | End: 2019-10-05 | Stop reason: SDUPTHER

## 2019-07-09 LAB
CREATININE, EXTERNAL: 0.94
INR, EXTERNAL: 2.2
PT, EXTERNAL: 25.7

## 2019-07-31 ENCOUNTER — HOSPITAL ENCOUNTER (OUTPATIENT)
Dept: GENERAL RADIOLOGY | Age: 64
Discharge: HOME | End: 2019-07-31
Attending: FAMILY MEDICINE
Payer: COMMERCIAL

## 2019-07-31 ENCOUNTER — OFFICE VISIT (OUTPATIENT)
Dept: FAMILY MEDICINE CLINIC | Age: 64
End: 2019-07-31

## 2019-07-31 VITALS
DIASTOLIC BLOOD PRESSURE: 84 MMHG | WEIGHT: 293 LBS | OXYGEN SATURATION: 97 % | SYSTOLIC BLOOD PRESSURE: 123 MMHG | TEMPERATURE: 98.6 F | HEIGHT: 68 IN | RESPIRATION RATE: 24 BRPM | BODY MASS INDEX: 44.41 KG/M2 | HEART RATE: 70 BPM

## 2019-07-31 DIAGNOSIS — M79.671 PAIN IN BOTH FEET: ICD-10-CM

## 2019-07-31 DIAGNOSIS — M79.672 PAIN IN BOTH FEET: ICD-10-CM

## 2019-07-31 DIAGNOSIS — M79.672 PAIN IN BOTH FEET: Primary | ICD-10-CM

## 2019-07-31 DIAGNOSIS — M79.671 PAIN IN BOTH FEET: Primary | ICD-10-CM

## 2019-07-31 PROCEDURE — 73630 X-RAY EXAM OF FOOT: CPT

## 2019-07-31 NOTE — PROGRESS NOTES
Gena Aleman is a 59 y.o. female      Chief Complaint   Patient presents with    Foot Pain     Bilateral foot pain ( top of feet )          1. Have you been to the ER, urgent care clinic since your last visit? Hospitalized since your last visit? No      2. Have you seen or consulted any other health care providers outside of the [address] [place] [address] since your last visit? Include any pap smears or colon screening.    No

## 2019-07-31 NOTE — PROGRESS NOTES
Assessment and Plan    1. Pain in both feet  No injury, only recent change is addition of lasix,  Will check xrays and look for gout or other inflammatory arthritis  - XR FOOT LT MIN 3 V; Future  - XR FOOT RT MIN 3 V; Future  - URIC ACID  - SED RATE (ESR)  - RHEUMASSURE      Follow-up and Dispositions    · Return in about 2 weeks (around 8/14/2019) for Review test results. Diagnosis and plan discussed with patient who verbillized understanding. History of present illness: Lesley Rdz is a 59 y.o. female presenting for Foot Pain (Bilateral foot pain ( top of feet ) )    Bones in top of both feet hurt  Hurts if she sits and then gets up  Hurts 3-4 minutes to even walk due to pain. No swelling   This has been going on for several months and worsening. Very painful. No previous foot problems or injuries. Hurts bilaterally from MTP's to forefoot midfoot junction  No other joint pain. Review of Systems   Respiratory: Negative. Cardiovascular: Negative. Musculoskeletal: Positive for joint pain. Negative for falls.          Past Medical History:   Diagnosis Date    Atrial fibrillation (HCC)     Chronic renal failure     Diabetes ([address]     Hearing loss     Hypertension     Migraine     Morbid obesity (HCC)     Osteoarthritis     Sleep apnea     no CPAP    Thyroid disease      Past Surgical History:   Procedure Laterality Date    CARDIAC SURG PROCEDURE UNLIST      cardioversion 11/16/2018    CARDIAC SURG PROCEDURE UNLIST      ablation, 2016    COLONOSCOPY N/A 12/4/2018    COLONOSCOPY performed by Ignacia Vega MD at [address] HX COLONOSCOPY  12/11/2018    normal, repeat 5 yrs     HX DILATION AND CURETTAGE      HX HYSTERECTOMY       Family History   Problem Relation Age of Onset    Heart Disease Mother     Hypertension Mother     Diabetes Mother      Social History     Socioeconomic History    Marital status:      Spouse name: Not on file    Number of children: Not on file    Years of education: Not on file    Highest education level: Not on file   Occupational History    Not on file   Social Needs    Financial resource strain: Not on file    Food insecurity:     Worry: Not on file     Inability: Not on file    Transportation needs:     Medical: Not on file     Non-medical: Not on file   Tobacco Use    Smoking status: Former Smoker     Years: 1.00     Types: Cigarettes    Smokeless tobacco: Never Used   Substance and Sexual Activity    Alcohol use: No    Drug use: No    Sexual activity: Not Currently   Lifestyle    Physical activity:     Days per week: Not on file     Minutes per session: Not on file    Stress: Not on file   Relationships    Social connections:     Talks on phone: Not on file     Gets together: Not on file     Attends Taoism service: Not on file     Active member of club or organization: Not on file     Attends meetings of clubs or organizations: Not on file     Relationship status: Not on file    Intimate partner violence:     Fear of current or ex partner: Not on file     Emotionally abused: Not on file     Physically abused: Not on file     Forced sexual activity: Not on file   Other Topics Concern    Not on file   Social History Narrative    Not on file         Current Outpatient Medications   Medication Sig Dispense Refill    metoprolol succinate (TOPROL-XL) 25 mg XL tablet Take 1 Tab by mouth daily. 90 Tab 1    amLODIPine (NORVASC) 10 mg tablet Take 1 Tab by mouth daily. 90 Tab 1    atorvastatin (LIPITOR) 10 mg tablet Take 1 Tab by mouth daily. 90 Tab 1    celecoxib (CELEBREX) 100 mg capsule Take 1 Cap by mouth two (2) times a day. 180 Cap 1    dulaglutide (TRULICITY) 1.5 RY/2.5 mL sub-q pen 0.5 mL by SubCUTAneous route every seven (7) days. Indications: type 2 diabetes mellitus 12 Pen 1    levothyroxine (SYNTHROID) 137 mcg tablet Take 137 mcg by mouth Daily (before breakfast).  90 Tab 1    losartan (COZAAR) 100 mg tablet Take 1 Tab by mouth daily. 90 Tab 1    rivaroxaban (XARELTO) 20 mg tab tablet Take 1 Tab by mouth daily. 90 Tab 1    furosemide (LASIX) 20 mg tablet Take 1 Tab by mouth daily. 90 Tab 1    flecainide (TAMBOCOR) 50 mg tablet Take 50 mg by mouth two (2) times a day.  acetaminophen (TYLENOL EXTRA STRENGTH PO) Take  by mouth. Allergies   Allergen Reactions    Ibuprofen Hives       Vitals:    07/31/19 0949   BP: 123/84   Pulse: 70   Resp: 24   Temp: 98.6 °F (37 °C)   TempSrc: Oral   SpO2: 97%   Weight: 341 lb (154.7 kg)   Height: 5' 8\" (1.727 m)     Body mass index is 51.85 kg/m². Objective  General: Patient alert and oriented and in NAD  Feet, normal anatomy, good pulses bilaterally, motor function intact bilaterally,  Left mildly tender with motion of forefoot midfoot junction, no pain at MTP's  Right similarly tender midfoot, forefoot junction, no tenderness of MTP's   No signs of trauma, normal foot anatomy otherwise. Neuro: AAOx3, normal gait and speech. No gross neurologic deficits. Psych: Appropriate mood and affect, no homicidal or suicidal ideation, no obsessions, delusions or hallucinations, normal psychomotor status.

## 2019-08-01 NOTE — PROGRESS NOTES
The xrays show only arthritis of both feet.   See me in 2 weeks and we'll go over the xrays further and talk about what your options are  [facility]

## 2019-08-05 ENCOUNTER — HOSPITAL ENCOUNTER (OUTPATIENT)
Dept: GENERAL RADIOLOGY | Age: 64
Discharge: HOME | End: 2019-08-05
Attending: INTERNAL MEDICINE
Payer: COMMERCIAL

## 2019-08-05 DIAGNOSIS — I48.3 TYPICAL ATRIAL FLUTTER (HCC): ICD-10-CM

## 2019-08-05 PROCEDURE — 71046 X-RAY EXAM CHEST 2 VIEWS: CPT

## 2019-08-07 LAB
14.3.3 ETA, RHEUM. ARTHRITIS: <0.2 NG/ML
CCP IGA+IGG SERPL IA-ACNC: <20 UNITS
ERYTHROCYTE [SEDIMENTATION RATE] IN BLOOD BY WESTERGREN METHOD: 15 MM/HR (ref 0–40)
RHEUMATOID FACT SERPL-ACNC: <14 UNITS/ML
URATE SERPL-MCNC: 5.1 MG/DL (ref 2.5–7.1)

## 2019-08-26 ENCOUNTER — OFFICE VISIT (OUTPATIENT)
Dept: FAMILY MEDICINE CLINIC | Age: 64
End: 2019-08-26

## 2019-08-26 VITALS
OXYGEN SATURATION: 97 % | RESPIRATION RATE: 18 BRPM | TEMPERATURE: 98.4 F | WEIGHT: 293 LBS | BODY MASS INDEX: 44.41 KG/M2 | SYSTOLIC BLOOD PRESSURE: 144 MMHG | HEART RATE: 64 BPM | DIASTOLIC BLOOD PRESSURE: 76 MMHG | HEIGHT: 68 IN

## 2019-08-26 DIAGNOSIS — M19.079 ARTHRITIS OF MIDFOOT: Primary | ICD-10-CM

## 2019-08-26 RX ORDER — AMIODARONE HYDROCHLORIDE 200 MG/1
200 TABLET ORAL DAILY
Refills: 1 | COMMUNITY
Start: 2019-08-07 | End: 2020-01-29 | Stop reason: SDUPTHER

## 2019-08-26 RX ORDER — SITAGLIPTIN 100 MG/1
100 TABLET, FILM COATED ORAL DAILY
COMMUNITY
Start: 2019-08-03 | End: 2019-10-21 | Stop reason: ALTCHOICE

## 2019-08-26 NOTE — PROGRESS NOTES
Assessment and Plan    1. Arthritis of midfoot  Bilateral on xray and exams  Already on Celebrex (and Xarelto)  Referral to podiatry to consider footwear and shoe insert options  - REFERRAL TO PODIATRY      Follow-up and Dispositions    · Return in about 3 months (around 11/26/2019) for Diabetes follow up, Medication follow up, Blood pressure follow up. Diagnosis and plan discussed with patient who verbillized understanding. History of present illness: Brent Toscano is a 59 y.o. female presenting for Foot Pain (bilateral foot pain)    FU of foot pain  No injury  No changes since last visit  Xrays showed midfoot arthritis bilaterally  Using tylenol and already on Celebrex  Hurts to bear weight more than just a little while. Review of Systems   Musculoskeletal: Positive for joint pain. Negative for falls. Neurological: Negative for sensory change, focal weakness and weakness.          Past Medical History:   Diagnosis Date    Atrial fibrillation (HCC)     Chronic renal failure     Diabetes ([address]     Hearing loss     Hypertension     Migraine     Morbid obesity (HCC)     Osteoarthritis     Sleep apnea     no CPAP    Thyroid disease      Past Surgical History:   Procedure Laterality Date    CARDIAC SURG PROCEDURE UNLIST      cardioversion 11/16/2018    CARDIAC SURG PROCEDURE UNLIST      ablation, 2016    COLONOSCOPY N/A 12/4/2018    COLONOSCOPY performed by Maine Abreu [facility] at [facility] HX COLONOSCOPY  12/11/2018    normal, repeat 5 yrs     HX DILATION AND CURETTAGE      HX HYSTERECTOMY       Family History   Problem Relation Age of Onset    Heart Disease Mother     Hypertension Mother     Diabetes Mother      Social History     Socioeconomic History    Marital status:      Spouse name: Not on file    Number of children: Not on file    Years of education: Not on file    Highest education level: Not on file   Occupational History    Not on file   Social Needs    Financial resource strain: Not on file    Food insecurity:     Worry: Not on file     Inability: Not on file    Transportation needs:     Medical: Not on file     Non-medical: Not on file   Tobacco Use    Smoking status: Former Smoker     Years: 1.00     Types: Cigarettes    Smokeless tobacco: Never Used   Substance and Sexual Activity    Alcohol use: No    Drug use: No    Sexual activity: Not Currently   Lifestyle    Physical activity:     Days per week: Not on file     Minutes per session: Not on file    Stress: Not on file   Relationships    Social connections:     Talks on phone: Not on file     Gets together: Not on file     Attends Confucianism service: Not on file     Active member of club or organization: Not on file     Attends meetings of clubs or organizations: Not on file     Relationship status: Not on file    Intimate partner violence:     Fear of current or ex partner: Not on file     Emotionally abused: Not on file     Physically abused: Not on file     Forced sexual activity: Not on file   Other Topics Concern    Not on file   Social History Narrative    Not on file         Current Outpatient Medications   Medication Sig Dispense Refill    JANUVIA 100 mg tablet Take 100 mg by mouth daily.  metoprolol succinate (TOPROL-XL) 25 mg XL tablet Take 1 Tab by mouth daily. 90 Tab 1    amLODIPine (NORVASC) 10 mg tablet Take 1 Tab by mouth daily. 90 Tab 1    atorvastatin (LIPITOR) 10 mg tablet Take 1 Tab by mouth daily. 90 Tab 1    celecoxib (CELEBREX) 100 mg capsule Take 1 Cap by mouth two (2) times a day. 180 Cap 1    dulaglutide (TRULICITY) 1.5 ZJ/4.2 mL sub-q pen 0.5 mL by SubCUTAneous route every seven (7) days. Indications: type 2 diabetes mellitus 12 Pen 1    levothyroxine (SYNTHROID) 137 mcg tablet Take 137 mcg by mouth Daily (before breakfast). 90 Tab 1    losartan (COZAAR) 100 mg tablet Take 1 Tab by mouth daily.  90 Tab 1    rivaroxaban (XARELTO) 20 mg tab tablet Take 1 Tab by mouth daily. 90 Tab 1    furosemide (LASIX) 20 mg tablet Take 1 Tab by mouth daily. 90 Tab 1    amiodarone (CORDARONE) 200 mg tablet TAKE 2 TABLETS BY MOUTH TWICE DAILY FOR 7 DAYS THEN 2 ONCE DAILY  1    flecainide (TAMBOCOR) 50 mg tablet Take 50 mg by mouth two (2) times a day.  acetaminophen (TYLENOL EXTRA STRENGTH PO) Take  by mouth. Allergies   Allergen Reactions    Ibuprofen Hives       Vitals:    08/26/19 1514   BP: 144/76   Pulse: 64   Resp: 18   Temp: 98.4 °F (36.9 °C)   TempSrc: Oral   SpO2: 97%   Weight: 338 lb (153.3 kg)   Height: 5' 8\" (1.727 m)     Body mass index is 51.39 kg/m². Objective  General: Patient alert and oriented and in NAD  Bilateral feet, normal pulses, FROM Ankle and toes without pain  Pain bilaterally with manipulation/twisting of forefoot/midfoot junction  This coincides with areas of arthritis on xrays. Psych: Appropriate mood and affect, no homicidal or suicidal ideation, no obsessions, delusions or hallucinations, normal psychomotor status.

## 2019-08-26 NOTE — PROGRESS NOTES
1. Have you been to the ER, urgent care clinic since your last visit? Hospitalized since your last visit? No    2. Have you seen or consulted any other health care providers outside of the [address] [place] [address] since your last visit? Include any pap smears or colon screening.  No

## 2019-08-30 LAB
CREATININE, EXTERNAL: 1.07
INR, EXTERNAL: 2
PT, EXTERNAL: 22.9

## 2019-09-23 PROBLEM — Z12.11 COLON CANCER SCREENING: Status: RESOLVED | Noted: 2018-11-15 | Resolved: 2019-09-23

## 2019-10-21 ENCOUNTER — OFFICE VISIT (OUTPATIENT)
Dept: FAMILY MEDICINE CLINIC | Age: 64
End: 2019-10-21

## 2019-10-21 VITALS
HEART RATE: 45 BPM | WEIGHT: 293 LBS | RESPIRATION RATE: 22 BRPM | OXYGEN SATURATION: 98 % | HEIGHT: 68 IN | BODY MASS INDEX: 44.41 KG/M2 | DIASTOLIC BLOOD PRESSURE: 63 MMHG | TEMPERATURE: 98.5 F | SYSTOLIC BLOOD PRESSURE: 121 MMHG

## 2019-10-21 DIAGNOSIS — M19.90 OSTEOARTHRITIS, UNSPECIFIED OSTEOARTHRITIS TYPE, UNSPECIFIED SITE: ICD-10-CM

## 2019-10-21 DIAGNOSIS — Z11.59 ENCOUNTER FOR HEPATITIS C SCREENING TEST FOR LOW RISK PATIENT: ICD-10-CM

## 2019-10-21 DIAGNOSIS — I10 ESSENTIAL HYPERTENSION: ICD-10-CM

## 2019-10-21 DIAGNOSIS — E03.9 ACQUIRED HYPOTHYROIDISM: ICD-10-CM

## 2019-10-21 DIAGNOSIS — E78.00 HYPERCHOLESTEREMIA: ICD-10-CM

## 2019-10-21 DIAGNOSIS — I48.20 CHRONIC ATRIAL FIBRILLATION (HCC): ICD-10-CM

## 2019-10-21 DIAGNOSIS — Z00.00 ANNUAL PHYSICAL EXAM: ICD-10-CM

## 2019-10-21 DIAGNOSIS — E11.8 TYPE 2 DIABETES MELLITUS WITH COMPLICATION, WITHOUT LONG-TERM CURRENT USE OF INSULIN (HCC): ICD-10-CM

## 2019-10-21 DIAGNOSIS — E11.21 TYPE 2 DIABETES WITH NEPHROPATHY (HCC): ICD-10-CM

## 2019-10-21 DIAGNOSIS — Z23 ENCOUNTER FOR IMMUNIZATION: Primary | ICD-10-CM

## 2019-10-21 RX ORDER — ATORVASTATIN CALCIUM 10 MG/1
10 TABLET, FILM COATED ORAL DAILY
Qty: 90 TAB | Refills: 1 | Status: SHIPPED | OUTPATIENT
Start: 2019-10-21 | End: 2020-01-29 | Stop reason: SDUPTHER

## 2019-10-21 RX ORDER — CELECOXIB 100 MG/1
CAPSULE ORAL
Qty: 180 CAP | Refills: 1 | Status: SHIPPED | OUTPATIENT
Start: 2019-10-21 | End: 2020-01-29 | Stop reason: SDUPTHER

## 2019-10-21 RX ORDER — AMLODIPINE BESYLATE 10 MG/1
10 TABLET ORAL DAILY
Qty: 90 TAB | Refills: 1 | Status: SHIPPED | OUTPATIENT
Start: 2019-10-21 | End: 2020-01-29 | Stop reason: SDUPTHER

## 2019-10-21 RX ORDER — METOPROLOL SUCCINATE 25 MG/1
TABLET, EXTENDED RELEASE ORAL
Qty: 90 TAB | Refills: 1 | Status: SHIPPED | OUTPATIENT
Start: 2019-10-21 | End: 2020-01-29 | Stop reason: SDUPTHER

## 2019-10-21 RX ORDER — LEVOTHYROXINE SODIUM 137 UG/1
137 TABLET ORAL
Qty: 90 TAB | Refills: 1 | Status: SHIPPED | OUTPATIENT
Start: 2019-10-21 | End: 2020-01-29 | Stop reason: SDUPTHER

## 2019-10-21 RX ORDER — FUROSEMIDE 20 MG/1
20 TABLET ORAL DAILY
Qty: 90 TAB | Refills: 1 | Status: SHIPPED | OUTPATIENT
Start: 2019-10-21 | End: 2020-01-29 | Stop reason: SDUPTHER

## 2019-10-21 RX ORDER — LOSARTAN POTASSIUM 100 MG/1
100 TABLET ORAL DAILY
Qty: 90 TAB | Refills: 1 | Status: SHIPPED | OUTPATIENT
Start: 2019-10-21 | End: 2020-01-29 | Stop reason: SDUPTHER

## 2019-10-21 NOTE — PATIENT INSTRUCTIONS
Diabetes: 
Blood sugar goals: 
Hemoglobin A1c under 7 Fasting blood sugar  Blood sugar 2 hours after a meal under 180, 4 hours after a meal under 120 No hypoglycemia (sugars under 70 and symptomatic low sugars) Blood sugar control with diet and exercise: 
Exercise 45 minutes per day. This makes your insulin work better. It also allows insulin levels to fall helping with weight loss. Every night, try to fast from your evening meal to breakfast (at least 12 hours) without eating anything. This uses stored energy in your liver and makes insulin work better. Avoid simples sugars such as table sugar in drinks (sodas, lemonade, sweet tea, wine), desserts, candy. Also avoid fruit juices and high fructose corn syrup. Avoid frequent consumption of fruit especially grapes and bananas. A single serving of fruit daily is all you should have. Finally, drink less milk which has milk sugar in it. Control your starch intake. Men should have 3-4 carb portions per meal.  Women should have 2-3 carb portions per meal.  A carb serving is the equivalent of a slice of bread. Control your blood pressure and cholesterol. These problems are common in diabetes. AND, don't smoke. All of these problems contribute to heart disease and stroke risk. Get a yearly eye exam and flu shot. Make sure your vaccines are up to date particularly the pneumonia vaccines. Inspect your feet daily. Wear comfortable protective shoes and clean socks. Seek medical care if there are sore, calluses or numbness and burning of your feet. See your doctor at least every 6 months if you are on oral medicines or more often if the diabetic control is not at goal or if you are on insulin. Take your medicines religiously. The goal blood pressure for most patients is 140/90. The whole point of treating blood pressure is to prevent strokes, heart attacks and kidney damage.   Persistently elevated blood pressure damages blood vessels and can lead to catastrophic heart problems and strokes. Recommendations for Hypertension (elevated blood pressure): · Purchase a blood pressure cuff that goes around your upper arm and check blood pressure at least 3 times per week. Write down your numbers for review. Check blood pressure in the morning and evening. Rest for 5 minutes with feet elevated and arm resting on a table (at mid-chest level) when checking blood pressure · Exercise 30-60 minutes most days of the week, preferably with a mix of cardiovascular and strength training. Exercise can improve blood pressure, even if you do not lose weight! · Limit alcohol intake to less than 3-4 drinks per week. · Avoid tobacco products · Avoid illegal drugs especially amphetamines · DASH diet - includes fruits, vegetables, fiber, and less than 2000 mg sodium (salt) daily. · Try to eat a low sugar diet. Sugar worsens diabetes and activates kidney hormones that raise blood pressure. · Avoid non-steroidal inflammatory medications (NSAIDS) such as ibuprofen, advil, motrin, aleve, and naproxyn as these may increase blood pressure if used long-term · Avoid OTC decongestants such as pseudopherine, phenylephrine, Afrin · Take your blood pressure medicine (and aspirin if prescribed) religiously

## 2019-10-21 NOTE — PROGRESS NOTES
Assessment and Plan    1. Essential hypertension  At goal  - amLODIPine (NORVASC) 10 mg tablet; Take 1 Tab by mouth daily. Dispense: 90 Tab; Refill: 1  - furosemide (LASIX) 20 mg tablet; Take 1 Tab by mouth daily. Dispense: 90 Tab; Refill: 1  - losartan (COZAAR) 100 mg tablet; Take 1 Tab by mouth daily. Dispense: 90 Tab; Refill: 1  - METABOLIC PANEL, BASIC  - MICROALBUMIN, UR, RAND W/ MICROALB/CREAT RATIO    2. Hypercholesteremia  Continue statin  - atorvastatin (LIPITOR) 10 mg tablet; Take 1 Tab by mouth daily. Dispense: 90 Tab; Refill: 1  - HEPATIC FUNCTION PANEL  - LIPID PANEL    3. Osteoarthritis, unspecified osteoarthritis type, unspecified site  Continue [place] 2, knows to watch for rectal bleeding and melena  - celecoxib (CELEBREX) 100 mg capsule; TAKE 1 CAPSULE BY MOUTH TWICE DAILY  Dispense: 180 Cap; Refill: 1    4. Type 2 diabetes mellitus with complication, without long-term current use of insulin (HCC)  Previously good control on trulicity. Off of Januvia  - dulaglutide (TRULICITY) 1.5 ZW/6.4 mL sub-q pen; 0.5 mL by SubCUTAneous route every seven (7) days. Indications: type 2 diabetes mellitus  Dispense: 12 Pen; Refill: 1  - CBC WITH AUTOMATED DIFF  - HEMOGLOBIN A1C WITH EAG    5. Acquired hypothyroidism  refilled  - levothyroxine (SYNTHROID) 137 mcg tablet; Take 137 mcg by mouth Daily (before breakfast). Dispense: 90 Tab; Refill: 1  - TSH 3RD GENERATION    6. Chronic atrial fibrillation  Repeat HR 52  - metoprolol succinate (TOPROL-XL) 25 mg XL tablet; TAKE 1 TABLET BY MOUTH DAILY  Dispense: 90 Tab; Refill: 1  - rivaroxaban (XARELTO) 20 mg tab tablet; Take 1 Tab by mouth daily. Dispense: 90 Tab; Refill: 1    7. Type 2 diabetes with nephropathy (HCC)  As above  8. Encounter for immunization  Updated shots  - pneumococcal 23-valent (PNEUMOVAX 23) 25 mcg/0.5 mL injection; 0.5 mL by IntraMUSCular route once for 1 dose. Dispense: 0.5 mL; Refill: 0    9.  Encounter for hepatitis C screening test for low risk patient  screening  - HEPATITIS C AB      Follow-up and Dispositions    · Return in about 6 months (around 4/21/2020) for Diabetes follow up, Medication follow up. Diagnosis and plan discussed with patient who verbillized understanding. History of present illness: Aníbal Phan is a 59 y.o. female presenting for Complete Physical and Medication Refill    Annual exam  Meds reviewed, refilled and updated    Immunizations needs Pneumovacc    Cancer screening up to date including mammogram and colon, is S/P MARCELLE, nonsmoker    DM 2  Doing well with Januvia  Eye exam up to date. Not able to lose weight  Getting shoe inserts from podiatry    Hypertension  At goal    Continues to see Cardiology for A fib. Review of Systems   Constitutional: Negative for weight loss. Respiratory: Positive for shortness of breath (on exertion, stable). Cardiovascular: Negative for chest pain and palpitations. Gastrointestinal: Negative. Genitourinary: Negative. Psychiatric/Behavioral: Negative.           Past Medical History:   Diagnosis Date    Atrial fibrillation (HCC)     Chronic renal failure     Diabetes ([facility] [address]     Hearing loss     Hypertension     Migraine     Morbid obesity (HCC)     Osteoarthritis     Sleep apnea     no CPAP    Thyroid disease      Past Surgical History:   Procedure Laterality Date    CARDIAC SURG PROCEDURE UNLIST      cardioversion 11/16/2018    CARDIAC SURG PROCEDURE UNLIST      ablation, 2016    COLONOSCOPY N/A 12/4/2018    COLONOSCOPY performed by Jose Juan Pfeiffer MD at [facility] [address], normal, repeat 5 yeasr    HX DILATION AND CURETTAGE      HX HYSTERECTOMY       Family History   Problem Relation Age of Onset    Heart Disease Mother     Hypertension Mother     Diabetes Mother      Social History     Socioeconomic History    Marital status:      Spouse name: Not on file    Number of children: Not on file    Years of education: Not on file    Highest education level: Not on file   Occupational History    Not on file   Social Needs    Financial resource strain: Not on file    Food insecurity:     Worry: Not on file     Inability: Not on file    Transportation needs:     Medical: Not on file     Non-medical: Not on file   Tobacco Use    Smoking status: Former Smoker     Years: 1.00     Types: Cigarettes    Smokeless tobacco: Never Used   Substance and Sexual Activity    Alcohol use: No    Drug use: No    Sexual activity: Not Currently   Lifestyle    Physical activity:     Days per week: Not on file     Minutes per session: Not on file    Stress: Not on file   Relationships    Social connections:     Talks on phone: Not on file     Gets together: Not on file     Attends Restorationist service: Not on file     Active member of club or organization: Not on file     Attends meetings of clubs or organizations: Not on file     Relationship status: Not on file    Intimate partner violence:     Fear of current or ex partner: Not on file     Emotionally abused: Not on file     Physically abused: Not on file     Forced sexual activity: Not on file   Other Topics Concern    Not on file   Social History Narrative    Not on file         Current Outpatient Medications   Medication Sig Dispense Refill    amLODIPine (NORVASC) 10 mg tablet Take 1 Tab by mouth daily. 90 Tab 1    atorvastatin (LIPITOR) 10 mg tablet Take 1 Tab by mouth daily. 90 Tab 1    celecoxib (CELEBREX) 100 mg capsule TAKE 1 CAPSULE BY MOUTH TWICE DAILY 180 Cap 1    dulaglutide (TRULICITY) 1.5 SD/8.8 mL sub-q pen 0.5 mL by SubCUTAneous route every seven (7) days. Indications: type 2 diabetes mellitus 12 Pen 1    furosemide (LASIX) 20 mg tablet Take 1 Tab by mouth daily. 90 Tab 1    levothyroxine (SYNTHROID) 137 mcg tablet Take 137 mcg by mouth Daily (before breakfast). 90 Tab 1    losartan (COZAAR) 100 mg tablet Take 1 Tab by mouth daily.  90 Tab 1    metoprolol succinate (TOPROL-XL) 25 mg XL tablet TAKE 1 TABLET BY MOUTH DAILY 90 Tab 1    rivaroxaban (XARELTO) 20 mg tab tablet Take 1 Tab by mouth daily. 90 Tab 1    pneumococcal 23-valent (PNEUMOVAX 23) 25 mcg/0.5 mL injection 0.5 mL by IntraMUSCular route once for 1 dose. 0.5 mL 0    amiodarone (CORDARONE) 200 mg tablet Take 200 mg by mouth daily. 1    acetaminophen (TYLENOL EXTRA STRENGTH PO) Take  by mouth. Allergies   Allergen Reactions    Ibuprofen Hives       Vitals:    10/21/19 0848   BP: 121/63   Pulse: (!) 45   Resp: 22   Temp: 98.5 °F (36.9 °C)   TempSrc: Oral   SpO2: 98%   Weight: 341 lb (154.7 kg)   Height: 5' 8\" (1.727 m)     Body mass index is 51.85 kg/m². Objective  Physical Exam  General: Patient alert and oriented and in NAD, overweight  Eyes: PER/EOMI, no conjunctival pallor or scleral icterus. ENT: Nares normal, TM's clear with normal architecture and light reflex, Mouth normal, throat without exudate, uvula midline  Neck: No thyromegaly or cervical lymphadenopathy  Cardiovascular: Heart has regular rate and rhythm, No murmurs, rubs or gallops. 1+ edema  Respiratory: Lungs are clear to auscultation bilaterally, no wheezing, rales or rhonchi, normal chest excursion and no increased work of breathing. Gastorintestinal: abdomen is non-tender, non-distended, without organomegaly or masses  Genitourinary: exam deferred  Musculoskeletal: All four extremities present and functional.   Skin: No rashes or lesions noted on exposed skin  Neuro: AAOx3, normal gait and speech. No gross neurologic deficits. Psych: Appropriate mood and affect, no homicidal or suicidal ideation, no obsessions, delusions or hallucinations, normal psychomotor status.

## 2019-10-22 LAB
ALBUMIN SERPL-MCNC: 4.3 G/DL (ref 3.6–4.8)
ALBUMIN/CREAT UR: 4 MG/G CREAT (ref 0–30)
ALP SERPL-CCNC: 82 IU/L (ref 39–117)
ALT SERPL-CCNC: 10 IU/L (ref 0–32)
AST SERPL-CCNC: 15 IU/L (ref 0–40)
BASOPHILS # BLD AUTO: 0.1 X10E3/UL (ref 0–0.2)
BASOPHILS NFR BLD AUTO: 1 %
BILIRUB DIRECT SERPL-MCNC: 0.11 MG/DL (ref 0–0.4)
BILIRUB SERPL-MCNC: 0.6 MG/DL (ref 0–1.2)
BUN SERPL-MCNC: 19 MG/DL (ref 8–27)
BUN/CREAT SERPL: 21 (ref 12–28)
CALCIUM SERPL-MCNC: 9.5 MG/DL (ref 8.7–10.3)
CHLORIDE SERPL-SCNC: 101 MMOL/L (ref 96–106)
CHOLEST SERPL-MCNC: 185 MG/DL (ref 100–199)
CO2 SERPL-SCNC: 20 MMOL/L (ref 20–29)
CREAT SERPL-MCNC: 0.89 MG/DL (ref 0.57–1)
CREAT UR-MCNC: 195.6 MG/DL
EOSINOPHIL # BLD AUTO: 0.3 X10E3/UL (ref 0–0.4)
EOSINOPHIL NFR BLD AUTO: 3 %
ERYTHROCYTE [DISTWIDTH] IN BLOOD BY AUTOMATED COUNT: 14.3 % (ref 12.3–15.4)
EST. AVERAGE GLUCOSE BLD GHB EST-MCNC: 128 MG/DL
GLUCOSE SERPL-MCNC: 103 MG/DL (ref 65–99)
HBA1C MFR BLD: 6.1 % (ref 4.8–5.6)
HCT VFR BLD AUTO: 40.9 % (ref 34–46.6)
HCV AB S/CO SERPL IA: <0.1 S/CO RATIO (ref 0–0.9)
HDLC SERPL-MCNC: 49 MG/DL
HGB BLD-MCNC: 13.2 G/DL (ref 11.1–15.9)
IMM GRANULOCYTES # BLD AUTO: 0 X10E3/UL (ref 0–0.1)
IMM GRANULOCYTES NFR BLD AUTO: 1 %
LDLC SERPL CALC-MCNC: 112 MG/DL (ref 0–99)
LYMPHOCYTES # BLD AUTO: 2.2 X10E3/UL (ref 0.7–3.1)
LYMPHOCYTES NFR BLD AUTO: 25 %
MCH RBC QN AUTO: 27.2 PG (ref 26.6–33)
MCHC RBC AUTO-ENTMCNC: 32.3 G/DL (ref 31.5–35.7)
MCV RBC AUTO: 84 FL (ref 79–97)
MICROALBUMIN UR-MCNC: 7.9 UG/ML
MONOCYTES # BLD AUTO: 0.7 X10E3/UL (ref 0.1–0.9)
MONOCYTES NFR BLD AUTO: 8 %
NEUTROPHILS # BLD AUTO: 5.6 X10E3/UL (ref 1.4–7)
NEUTROPHILS NFR BLD AUTO: 62 %
PLATELET # BLD AUTO: 262 X10E3/UL (ref 150–450)
POTASSIUM SERPL-SCNC: 4.5 MMOL/L (ref 3.5–5.2)
PROT SERPL-MCNC: 7 G/DL (ref 6–8.5)
RBC # BLD AUTO: 4.86 X10E6/UL (ref 3.77–5.28)
SODIUM SERPL-SCNC: 141 MMOL/L (ref 134–144)
TRIGL SERPL-MCNC: 122 MG/DL (ref 0–149)
TSH SERPL DL<=0.005 MIU/L-ACNC: 1.44 UIU/ML (ref 0.45–4.5)
VLDLC SERPL CALC-MCNC: 24 MG/DL (ref 5–40)
WBC # BLD AUTO: 8.8 X10E3/UL (ref 3.4–10.8)

## 2019-10-22 NOTE — PROGRESS NOTES
Your blood work looks very good. The diabetic control remains excellent. Stick to your meds and your diet and see me in 6 months and as needed.   [facility]

## 2020-01-29 ENCOUNTER — OFFICE VISIT (OUTPATIENT)
Dept: FAMILY MEDICINE CLINIC | Age: 65
End: 2020-01-29

## 2020-01-29 VITALS
WEIGHT: 293 LBS | HEIGHT: 68 IN | DIASTOLIC BLOOD PRESSURE: 63 MMHG | TEMPERATURE: 97.8 F | BODY MASS INDEX: 44.41 KG/M2 | SYSTOLIC BLOOD PRESSURE: 149 MMHG | OXYGEN SATURATION: 97 % | RESPIRATION RATE: 22 BRPM | HEART RATE: 50 BPM

## 2020-01-29 DIAGNOSIS — E11.8 TYPE 2 DIABETES MELLITUS WITH COMPLICATION, WITHOUT LONG-TERM CURRENT USE OF INSULIN (HCC): ICD-10-CM

## 2020-01-29 DIAGNOSIS — M19.90 OSTEOARTHRITIS, UNSPECIFIED OSTEOARTHRITIS TYPE, UNSPECIFIED SITE: ICD-10-CM

## 2020-01-29 DIAGNOSIS — I10 ESSENTIAL HYPERTENSION: ICD-10-CM

## 2020-01-29 DIAGNOSIS — I48.20 CHRONIC ATRIAL FIBRILLATION (HCC): ICD-10-CM

## 2020-01-29 DIAGNOSIS — E03.9 ACQUIRED HYPOTHYROIDISM: ICD-10-CM

## 2020-01-29 DIAGNOSIS — E78.00 HYPERCHOLESTEREMIA: ICD-10-CM

## 2020-01-29 RX ORDER — AMIODARONE HYDROCHLORIDE 200 MG/1
200 TABLET ORAL DAILY
Qty: 90 TAB | Refills: 1 | Status: SHIPPED | OUTPATIENT
Start: 2020-01-29 | End: 2020-06-22

## 2020-01-29 RX ORDER — LOSARTAN POTASSIUM 100 MG/1
100 TABLET ORAL DAILY
Qty: 90 TAB | Refills: 1 | Status: SHIPPED | OUTPATIENT
Start: 2020-01-29 | End: 2020-06-22

## 2020-01-29 RX ORDER — LEVOTHYROXINE SODIUM 137 UG/1
137 TABLET ORAL
Qty: 90 TAB | Refills: 1 | Status: SHIPPED | OUTPATIENT
Start: 2020-01-29 | End: 2020-06-22

## 2020-01-29 RX ORDER — AMLODIPINE BESYLATE 10 MG/1
10 TABLET ORAL DAILY
Qty: 90 TAB | Refills: 1 | Status: SHIPPED | OUTPATIENT
Start: 2020-01-29 | End: 2020-06-22

## 2020-01-29 RX ORDER — FUROSEMIDE 20 MG/1
20 TABLET ORAL DAILY
Qty: 90 TAB | Refills: 1 | Status: SHIPPED | OUTPATIENT
Start: 2020-01-29 | End: 2020-06-22

## 2020-01-29 RX ORDER — METOPROLOL SUCCINATE 25 MG/1
12.5 TABLET, EXTENDED RELEASE ORAL DAILY
Qty: 90 TAB | Refills: 1 | Status: SHIPPED | OUTPATIENT
Start: 2020-01-29 | End: 2020-06-05 | Stop reason: ALTCHOICE

## 2020-01-29 RX ORDER — ATORVASTATIN CALCIUM 10 MG/1
10 TABLET, FILM COATED ORAL DAILY
Qty: 90 TAB | Refills: 1 | Status: SHIPPED | OUTPATIENT
Start: 2020-01-29 | End: 2020-06-22

## 2020-01-29 RX ORDER — CELECOXIB 100 MG/1
CAPSULE ORAL
Qty: 180 CAP | Refills: 1 | Status: SHIPPED | OUTPATIENT
Start: 2020-01-29 | End: 2020-06-22

## 2020-01-29 NOTE — PROGRESS NOTES
Identified pt with two pt identifiers(name and ). Reviewed record in preparation for visit and have obtained necessary documentation. Chief Complaint   Patient presents with    Hypertension     Low heart rate         Health Maintenance Due   Topic    Shingrix Vaccine Age 50> (2 of 2)    Bone Densitometry (Dexa) Screening     MEDICARE YEARLY EXAM        Coordination of Care Questionnaire:  :   1) Have you been to an emergency room, urgent care, or hospitalized since your last visit? If yes, where when, and reason for visit?   no       2. Have seen or consulted any other health care provider since your last visit? If yes, where when, and reason for visit?   No

## 2020-01-29 NOTE — PROGRESS NOTES
Assessment and Plan    1. Essential hypertension  Refilled meds for insurance pharmacy plan change  - amLODIPine (NORVASC) 10 mg tablet; Take 1 Tab by mouth daily. Dispense: 90 Tab; Refill: 1  - furosemide (LASIX) 20 mg tablet; Take 1 Tab by mouth daily. Dispense: 90 Tab; Refill: 1  - losartan (COZAAR) 100 mg tablet; Take 1 Tab by mouth daily. Dispense: 90 Tab; Refill: 1    2. Hypercholesteremia  refills  - atorvastatin (LIPITOR) 10 mg tablet; Take 1 Tab by mouth daily. Dispense: 90 Tab; Refill: 1    3. Osteoarthritis, unspecified osteoarthritis type, unspecified site  Refills. - celecoxib (CELEBREX) 100 mg capsule; TAKE 1 CAPSULE BY MOUTH TWICE DAILY  Dispense: 180 Cap; Refill: 1    4. Type 2 diabetes mellitus with complication, without long-term current use of insulin (HCC)  Continue Trulicity    5. Acquired hypothyroidism  On replacement  - levothyroxine (SYNTHROID) 137 mcg tablet; Take 137 mcg by mouth Daily (before breakfast). Dispense: 90 Tab; Refill: 1    6. Chronic atrial fibrillation  Low HR in 40's regularly, currently 54, Cut metoprolol to 12.5 mg per day  FU one month. Sooner if heart rates higher than 80  - amiodarone (CORDARONE) 200 mg tablet; Take 1 Tab by mouth daily. Dispense: 90 Tab; Refill: 1  - rivaroxaban (XARELTO) 20 mg tab tablet; Take 1 Tab by mouth daily. Dispense: 90 Tab; Refill: 1  - metoprolol succinate (TOPROL-XL) 25 mg XL tablet; Take 0.5 Tabs by mouth daily. Dispense: 90 Tab; Refill: 1      Follow-up and Dispositions    · Return in about 1 month (around 2/29/2020) for Medication follow up. Diagnosis and plan discussed with patient who verbillized understanding. History of present illness: Oumar Traore is a 72 y.o. female presenting for Hypertension (Low heart rate )    Bad headache Saturday AM  Checked her /44, Heartrate 44  Highest  and heartrate stayed in mid to low 40's  No syncope or near synocpe      Review of Systems   Constitutional: Negative for malaise/fatigue and weight loss. Respiratory: Negative for shortness of breath. Cardiovascular: Negative for chest pain and leg swelling. Gastrointestinal: Negative. Genitourinary: Negative. Musculoskeletal: Positive for joint pain. Neurological: Positive for headaches. Negative for loss of consciousness.          Past Medical History:   Diagnosis Date    Atrial fibrillation (HCC)     Chronic renal failure     Diabetes ([facility] [address]     Hearing loss     Hypertension     Migraine     Morbid obesity (HCC)     Osteoarthritis     Sleep apnea     no CPAP    Thyroid disease      Past Surgical History:   Procedure Laterality Date    CARDIAC SURG PROCEDURE UNLIST      cardioversion 11/16/2018    CARDIAC SURG PROCEDURE UNLIST      ablation, 2016    COLONOSCOPY N/A 12/4/2018    COLON[facility] performed [facility], normal, repeat 5 yeasr    HX DILATION AND CURETTAGE      HX HYSTERECTOMY       Family History   Problem Relation Age of Onset    Heart Disease Mother     Hypertension Mother     Diabetes Mother      Social History     Socioeconomic History    Marital status:      Spouse name: Not on file    Number of children: Not on file    Years of education: Not on file    Highest education level: Not on file   Occupational History    Not on file   Social Needs    Financial resource strain: Not on file    Food insecurity:     Worry: Not on file     Inability: Not on file    Transportation needs:     Medical: Not on file     Non-medical: Not on file   Tobacco Use    Smoking status: Former Smoker     Years: 1.00     Types: Cigarettes    Smokeless tobacco: Never Used   Substance and Sexual Activity    Alcohol use: No    Drug use: No    Sexual activity: Not Currently   Lifestyle    Physical activity:     Days per week: Not on file     Minutes per session: Not on file    Stress: Not on file   Relationships    Social connections:     Talks on phone: Not on file     Gets together: Not on file     Attends Sikh service: Not on file     Active member of club or organization: Not on file     Attends meetings of clubs or organizations: Not on file     Relationship status: Not on file    Intimate partner violence:     Fear of current or ex partner: Not on file     Emotionally abused: Not on file     Physically abused: Not on file     Forced sexual activity: Not on file   Other Topics Concern    Not on file   Social History Narrative    Not on file         Prior to Admission medications    Medication Sig Start Date End Date Taking? Authorizing Provider   amiodarone (CORDARONE) 200 mg tablet Take 1 Tab by mouth daily. 1/29/20  Yes Dinora Ybarra MD   amLODIPine (NORVASC) 10 mg tablet Take 1 Tab by mouth daily. 1/29/20  Yes Dinora Ybarra MD   atorvastatin (LIPITOR) 10 mg tablet Take 1 Tab by mouth daily. 1/29/20  Yes Dinora Ybarra MD   celecoxib (CELEBREX) 100 mg capsule TAKE 1 CAPSULE BY MOUTH TWICE DAILY 1/29/20  Yes Dinora Ybarra MD   furosemide (LASIX) 20 mg tablet Take 1 Tab by mouth daily. 1/29/20  Yes Dinora Ybarra MD   levothyroxine (SYNTHROID) 137 mcg tablet Take 137 mcg by mouth Daily (before breakfast). 1/29/20  Yes Dinora Ybarra MD   losartan (COZAAR) 100 mg tablet Take 1 Tab by mouth daily. 1/29/20  Yes Dinora Ybarra MD   rivaroxaban (XARELTO) 20 mg tab tablet Take 1 Tab by mouth daily. 1/29/20  Yes Dinora Ybarra MD   metoprolol succinate (TOPROL-XL) 25 mg XL tablet Take 0.5 Tabs by mouth daily. 1/29/20  Yes Dinora Ybarra MD   dulaglutide (TRULICITY) 1.5 VA/2.9 mL sub-q pen 0.5 mL by SubCUTAneous route every seven (7) days. Indications: type 2 diabetes mellitus 10/21/19  Yes Dinora Ybarra MD   acetaminophen (TYLENOL EXTRA STRENGTH PO) Take  by mouth. Yes Provider, Historical   amLODIPine (NORVASC) 10 mg tablet Take 1 Tab by mouth daily.  10/21/19 1/29/20  Dinora Ybarra MD   atorvastatin (LIPITOR) 10 mg tablet Take 1 Tab by mouth daily. 10/21/19 1/29/20  Elvia Rock MD   celecoxib (CELEBREX) 100 mg capsule TAKE 1 CAPSULE BY MOUTH TWICE DAILY 10/21/19 1/29/20  Elvia Rock MD   furosemide (LASIX) 20 mg tablet Take 1 Tab by mouth daily. 10/21/19 1/29/20  Elvia Rock MD   levothyroxine (SYNTHROID) 137 mcg tablet Take 137 mcg by mouth Daily (before breakfast). 10/21/19 1/29/20  Elvia Rock MD   losartan (COZAAR) 100 mg tablet Take 1 Tab by mouth daily. 10/21/19 1/29/20  Elvia Rock MD   metoprolol succinate (TOPROL-XL) 25 mg XL tablet TAKE 1 TABLET BY MOUTH DAILY 10/21/19 1/29/20  Elvia Rock MD   rivaroxaban (XARELTO) 20 mg tab tablet Take 1 Tab by mouth daily. 10/21/19 1/29/20  Elvia Rock MD   amiodarone (CORDARONE) 200 mg tablet Take 200 mg by mouth daily. 8/7/19 1/29/20  Provider, Historical        Allergies   Allergen Reactions    Ibuprofen Hives       Vitals:    01/29/20 0926 01/29/20 0935   BP: 165/78 149/63   Pulse: (!) 50    Resp: 22    Temp: 97.8 °F (36.6 °C)    TempSrc: Oral    SpO2: 97%    Weight: 344 lb (156 kg)    Height: 5' 8\" (1.727 m)      Body mass index is 52.31 kg/m². Objective  Physical Exam  Vitals signs and nursing note reviewed. Constitutional:       Appearance: Normal appearance. She is not toxic-appearing. HENT:      Head: Normocephalic and atraumatic. Neck:      Musculoskeletal: No muscular tenderness. Cardiovascular:      Rate and Rhythm: Normal rate and regular rhythm. Heart sounds: Normal heart sounds. No murmur. No gallop. Comments: Apical HR 54  Pulmonary:      Effort: Pulmonary effort is normal. No respiratory distress. Breath sounds: Normal breath sounds. No wheezing, rhonchi or rales. Lymphadenopathy:      Cervical: No cervical adenopathy. Neurological:      Mental Status: She is alert.    Psychiatric:         Mood and Affect: Mood normal.         Behavior: Behavior normal. Thought Content:  Thought content normal.         Judgment: Judgment normal.

## 2020-03-11 ENCOUNTER — OFFICE VISIT (OUTPATIENT)
Dept: FAMILY MEDICINE CLINIC | Age: 65
End: 2020-03-11

## 2020-03-11 VITALS
DIASTOLIC BLOOD PRESSURE: 47 MMHG | SYSTOLIC BLOOD PRESSURE: 172 MMHG | RESPIRATION RATE: 20 BRPM | WEIGHT: 293 LBS | HEART RATE: 54 BPM | TEMPERATURE: 98 F | OXYGEN SATURATION: 96 % | BODY MASS INDEX: 44.41 KG/M2 | HEIGHT: 68 IN

## 2020-03-11 DIAGNOSIS — R60.9 EDEMA, UNSPECIFIED TYPE: ICD-10-CM

## 2020-03-11 DIAGNOSIS — I10 ESSENTIAL HYPERTENSION: ICD-10-CM

## 2020-03-11 DIAGNOSIS — R00.1 BRADYCARDIA: ICD-10-CM

## 2020-03-11 DIAGNOSIS — I48.91 ATRIAL FIBRILLATION, UNSPECIFIED TYPE (HCC): Primary | ICD-10-CM

## 2020-03-11 PROBLEM — E66.01 MORBID OBESITY (HCC): Status: ACTIVE | Noted: 2020-03-11

## 2020-03-11 PROBLEM — R06.00 DYSPNEA: Status: ACTIVE | Noted: 2020-03-11

## 2020-03-11 NOTE — PROGRESS NOTES
Assessment and Plan    1. Atrial fibrillation, unspecified type ([address]  Follow up with Dr. Mercedes Whaley for bradycardia and weakness/fatigue    2. Essential hypertension  Up today. FU after cardiology visit to address edema and BP    3. Edema, unspecified type  Previous good EF and no history of liver or renal disease. Likely due to meds or obesity    4. Bradycardia  As above. Follow-up and Dispositions    · Return in about 1 month (around 4/11/2020) for Medication follow up, edema. Diagnosis and plan discussed with patient who verbillized understanding. History of present illness: Noemy Kaur is a 72 y.o. female presenting for Hypertension and Annual Wellness Visit ()    Heart rate   Last visit we changed metoprolol to 1/2 pill per day. Tired a lot,  HR when she checks it is 39-44 usually. Remains on Amiodarone. Still sees Dr. Mercedes Whaley. Review of Systems   Constitutional: Positive for malaise/fatigue. Respiratory: Negative for shortness of breath. Cardiovascular: Positive for leg swelling. Negative for chest pain. Neurological: Negative for loss of consciousness.          Past Medical History:   Diagnosis Date    Atrial fibrillation (HCC)     Chronic renal failure     Diabetes ([address]     Hearing loss     Hypertension     Migraine     Morbid obesity (HCC)     Osteoarthritis     Sleep apnea     no CPAP    Thyroid disease      Past Surgical History:   Procedure Laterality Date    CARDIAC SURG PROCEDURE UNLIST      cardioversion 11/16/2018    CARDIAC SURG PROCEDURE UNLIST      ablation, 2016    COLONOSCOPY N/A 12/4/2018    COLONOSCOPY performed by Minn[facility] De Anda [facility] at [facility], normal, repeat 5 yeasr    HX DILATION AND CURETTAGE      HX HYSTERECTOMY       Family History   Problem Relation Age of Onset    Heart Disease Mother     Hypertension Mother     Diabetes Mother      Social History     Socioeconomic History    Marital status:      Spouse name: Not on file    Number of children: Not on file    Years of education: Not on file    Highest education level: Not on file   Occupational History    Not on file   Social Needs    Financial resource strain: Not on file    Food insecurity     Worry: Not on file     Inability: Not on file    Transportation needs     Medical: Not on file     Non-medical: Not on file   Tobacco Use    Smoking status: Former Smoker     Years: 1.00     Types: Cigarettes    Smokeless tobacco: Never Used   Substance and Sexual Activity    Alcohol use: No    Drug use: No    Sexual activity: Not Currently   Lifestyle    Physical activity     Days per week: Not on file     Minutes per session: Not on file    Stress: Not on file   Relationships    Social connections     Talks on phone: Not on file     Gets together: Not on file     Attends Tenriism service: Not on file     Active member of club or organization: Not on file     Attends meetings of clubs or organizations: Not on file     Relationship status: Not on file    Intimate partner violence     Fear of current or ex partner: Not on file     Emotionally abused: Not on file     Physically abused: Not on file     Forced sexual activity: Not on file   Other Topics Concern    Not on file   Social History Narrative    Not on file         Prior to Admission medications    Medication Sig Start Date End Date Taking? Authorizing Provider   amiodarone (CORDARONE) 200 mg tablet Take 1 Tab by mouth daily. 1/29/20  Yes Gertrudis Sheppard MD   amLODIPine (NORVASC) 10 mg tablet Take 1 Tab by mouth daily. 1/29/20  Yes Gertrudis Sheppard MD   atorvastatin (LIPITOR) 10 mg tablet Take 1 Tab by mouth daily. 1/29/20  Yes Gertrudis Sheppard MD   celecoxib (CELEBREX) 100 mg capsule TAKE 1 CAPSULE BY MOUTH TWICE DAILY 1/29/20  Yes Gertrudis Sheppard MD   furosemide (LASIX) 20 mg tablet Take 1 Tab by mouth daily.  1/29/20  Yes Gertrudis Sheppard MD   levothyroxine (SYNTHROID) 137 mcg tablet Take 137 mcg by mouth Daily (before breakfast). 20  Yes Amador Blackwell MD   losartan (COZAAR) 100 mg tablet Take 1 Tab by mouth daily. 20  Yes Amador Blackwell MD   rivaroxaban (XARELTO) 20 mg tab tablet Take 1 Tab by mouth daily. 20  Yes Amador Blackwell MD   metoprolol succinate (TOPROL-XL) 25 mg XL tablet Take 0.5 Tabs by mouth daily. 20  Yes Amador Blackwell MD   dulaglutide (TRULICITY) 1.5 EJ/6.2 mL sub-q pen 0.5 mL by SubCUTAneous route every seven (7) days. Indications: type 2 diabetes mellitus 10/21/19  Yes Amador Blackwell MD   acetaminophen (TYLENOL EXTRA STRENGTH PO) Take  by mouth. Yes Provider, Historical        Allergies   Allergen Reactions    Ibuprofen Hives       Vitals:    20 1144 20 1152   BP: 182/72 172/47   Pulse: (!) 54    Resp: 20    Temp: 98 °F (36.7 °C)    TempSrc: Oral    SpO2: 96%    Weight: (!) 353 lb (160.1 kg)    Height: 5' 8\" (1.727 m)      Body mass index is 53.67 kg/m². Objective  Physical Exam  Vitals signs and nursing note reviewed. Constitutional:       Appearance: Normal appearance. She is not toxic-appearing. HENT:      Head: Normocephalic and atraumatic. Neck:      Musculoskeletal: No muscular tenderness. Cardiovascular:      Rate and Rhythm: Normal rate and regular rhythm. Heart sounds: Normal heart sounds. No murmur. No gallop. Musculoskeletal:      Right lower le+ Pitting Edema present. Left lower le+ Pitting Edema present. Lymphadenopathy:      Cervical: No cervical adenopathy. Neurological:      Mental Status: She is alert. Psychiatric:         Mood and Affect: Mood normal.         Behavior: Behavior normal.         Thought Content:  Thought content normal.         Judgment: Judgment normal.

## 2020-03-11 NOTE — PROGRESS NOTES
Identified pt with two pt identifiers(name and ). Reviewed record in preparation for visit and have obtained necessary documentation. Chief Complaint   Patient presents with    Hypertension        Health Maintenance Due   Topic    Shingrix Vaccine Age 49> (2 of 2)    Bone Densitometry (Dexa) Screening     Medicare Yearly Exam        Coordination of Care Questionnaire:  :   1) Have you been to an emergency room, urgent care, or hospitalized since your last visit? If yes, where when, and reason for visit? No       2. Have seen or consulted any other health care provider since your last visit? If yes, where when, and reason for visit?   No

## 2020-06-05 ENCOUNTER — VIRTUAL VISIT (OUTPATIENT)
Dept: FAMILY MEDICINE CLINIC | Age: 65
End: 2020-06-05

## 2020-06-05 VITALS — WEIGHT: 293 LBS | BODY MASS INDEX: 44.41 KG/M2 | HEIGHT: 68 IN

## 2020-06-05 DIAGNOSIS — M25.511 ACUTE PAIN OF RIGHT SHOULDER: Primary | ICD-10-CM

## 2020-06-05 RX ORDER — TRAMADOL HYDROCHLORIDE 50 MG/1
50 TABLET ORAL
Qty: 15 TAB | Refills: 0 | Status: SHIPPED | OUTPATIENT
Start: 2020-06-05 | End: 2020-06-10

## 2020-06-05 NOTE — PROGRESS NOTES
Adelaide Song is a 72 y.o. female who was seen by synchronous (real-time) audio-video technology on 6/5/2020. Consent: Adelaide Song, who was seen by synchronous (real-time) audio-video technology, and/or her healthcare decision maker, is aware that this patient-initiated, Telehealth encounter on 6/5/2020 is a billable service, with coverage as determined by her insurance carrier. She is aware that she may receive a bill and has provided verbal consent to proceed: Yes. Assessment & Plan:   Diagnoses and all orders for this visit:    1. Acute pain of right shoulder Previous Rotator cuff problems.  -     REFERRAL TO ORTHOPEDICS Requests Dr. Julian Islas  -     traMADoL (ULTRAM) 50 mg tablet; Take 1 Tab by mouth every six (6) hours as needed for Pain for up to 5 days. Max Daily Amount: 200 mg. Follow-up and Dispositions    · Return in about 1 month (around 7/5/2020) for Blood pressure follow up, Diabetes follow up, Medication follow up. I spent at least 23 minutes on this visit with this established patient. ([id number])    Subjective: Adelaide Song is a 72 y.o. female who was seen for Shoulder Pain (RT SHOULDER x 3 weeks  seems to be getting worst )    Right shoulder pain for 3 weeks. Now marked pain past week, Cannot move shoulder at all  Injected for rotator cuff tendonitis in past. No injuries. No prior surgery. Cannot abduct or internally rotate without pain  On celebrex. Prior to Admission medications    Medication Sig Start Date End Date Taking? Authorizing Provider   traMADoL (ULTRAM) 50 mg tablet Take 1 Tab by mouth every six (6) hours as needed for Pain for up to 5 days. Max Daily Amount: 200 mg. 6/5/20 6/10/20 Yes Rajan Carmona MD   amiodarone (CORDARONE) 200 mg tablet Take 1 Tab by mouth daily. 1/29/20  Yes Rajan Carmona MD   amLODIPine (NORVASC) 10 mg tablet Take 1 Tab by mouth daily.  1/29/20  Yes Rajan Carmona MD   atorvastatin (LIPITOR) 10 mg tablet Take 1 Tab by mouth daily. 1/29/20  Yes Silas Angulo MD   celecoxib (CELEBREX) 100 mg capsule TAKE 1 CAPSULE BY MOUTH TWICE DAILY  Patient taking differently: Take 100 mg by mouth two (2) times a day. TAKE 1 CAPSULE BY MOUTH TWICE DAILY 1/29/20  Yes Silas Angulo MD   furosemide (LASIX) 20 mg tablet Take 1 Tab by mouth daily. 1/29/20  Yes Silas Angulo MD   levothyroxine (SYNTHROID) 137 mcg tablet Take 137 mcg by mouth Daily (before breakfast). 1/29/20  Yes Silas Angulo MD   losartan (COZAAR) 100 mg tablet Take 1 Tab by mouth daily. 1/29/20  Yes Silas Angulo MD   rivaroxaban (XARELTO) 20 mg tab tablet Take 1 Tab by mouth daily. 1/29/20  Yes Silas Angulo MD   dulaglutide (TRULICITY) 1.5 CW/6.6 mL sub-q pen 0.5 mL by SubCUTAneous route every seven (7) days. Indications: type 2 diabetes mellitus 10/21/19  Yes Silas Angulo MD   acetaminophen (TYLENOL EXTRA STRENGTH PO) Take  by mouth. Yes Provider, Historical   metoprolol succinate (TOPROL-XL) 25 mg XL tablet Take 0.5 Tabs by mouth daily. 1/29/20 6/5/20  Silas Angulo MD     Allergies   Allergen Reactions    Ibuprofen Hives           Review of Systems   Musculoskeletal: Positive for joint pain. Negative for falls.        Objective:   Vital Signs: (As obtained by patient/caregiver at home)  Visit Vitals  Ht 5' 8\" (1.727 m)   Wt (!) 353 lb (160.1 kg)   BMI 53.67 kg/m²        [INSTRUCTIONS:  \"[x]\" Indicates a positive item  \"[]\" Indicates a negative item  -- DELETE ALL ITEMS NOT EXAMINED]    Constitutional: [x] Appears well-developed and well-nourished [x] No apparent distress      [] Abnormal -     Mental status: [x] Alert and awake  [x] Oriented to person/place/time [x] Able to follow commands    [] Abnormal -     Eyes:   EOM    [x]  Normal    [] Abnormal -   Sclera  [x]  Normal    [] Abnormal -          Discharge [x]  None visible   [] Abnormal -     HENT: [x] Normocephalic, atraumatic  [] Abnormal -   [x] Mouth/Throat: Mucous membranes are moist    External Ears [x] Normal  [] Abnormal -    Neck: [x] No visualized mass [] Abnormal -     Pulmonary/Chest: [x] Respiratory effort normal   [x] No visualized signs of difficulty breathing or respiratory distress        [] Abnormal -      Musculoskeletal:   [x] Normal gait with no signs of ataxia         [x] Normal range of motion of neck        [] Abnormal -     Neurological:        [x] No Facial Asymmetry (Cranial nerve 7 motor function) (limited exam due to video visit)          [x] No gaze palsy        [] Abnormal -          Skin:        [x] No significant exanthematous lesions or discoloration noted on facial skin         [] Abnormal -            Psychiatric:       [x] Normal Affect [] Abnormal -        [x] No Hallucinations    Other pertinent observable physical exam findings:-  Unable to move shoulder as I demonstrate due to pain. We discussed the expected course, resolution and complications of the diagnosis(es) in detail. Medication risks, benefits, costs, interactions, and alternatives were discussed as indicated. I advised her to contact the office if her condition worsens, changes or fails to improve as anticipated. She expressed understanding with the diagnosis(es) and plan. Danielle Brown is a 72 y.o. female who was evaluated by a video visit encounter for concerns as above. Patient identification was verified prior to start of the visit. A caregiver was present when appropriate. Due to this being a TeleHealth encounter (During HSACY-18 public health emergency), evaluation of the following organ systems was limited: Vitals/Constitutional/EENT/Resp/CV/GI//MS/Neuro/Skin/Heme-Lymph-Imm.   Pursuant to the emergency declaration under the [facility], [facility] waiver authority and the [employer] Act, this Virtual  Visit was conducted, with patient's (and/or legal guardian's) consent, to reduce the patient's risk of exposure to COVID-19 and provide necessary medical care. Services were provided through a video synchronous discussion virtually to substitute for in-person clinic visit. Patient was at home and I was in office for this video visit. Charla Real MD

## 2020-06-05 NOTE — PROGRESS NOTES
Kenisha Jack is a 72 y.o. female      Chief Complaint   Patient presents with    Shoulder Pain     RT SHOULDER x 3 weeks  seems to be getting worst          1. Have you been to the ER, urgent care clinic since your last visit? Hospitalized since your last visit? No      2. Have you seen or consulted any other health care providers outside of the [address] [place] [address] since your last visit? Include any pap smears or colon screening.   DR Tere Macias

## 2020-07-28 ENCOUNTER — PATIENT MESSAGE (OUTPATIENT)
Dept: FAMILY MEDICINE CLINIC | Age: 65
End: 2020-07-28

## 2020-07-28 DIAGNOSIS — N39.0 URINARY TRACT INFECTION WITHOUT HEMATURIA, SITE UNSPECIFIED: Primary | ICD-10-CM

## 2020-08-17 ENCOUNTER — OFFICE VISIT (OUTPATIENT)
Dept: FAMILY MEDICINE CLINIC | Age: 65
End: 2020-08-17
Payer: MEDICARE

## 2020-08-17 VITALS
HEART RATE: 61 BPM | HEIGHT: 68 IN | DIASTOLIC BLOOD PRESSURE: 65 MMHG | OXYGEN SATURATION: 95 % | BODY MASS INDEX: 44.41 KG/M2 | RESPIRATION RATE: 16 BRPM | TEMPERATURE: 97.9 F | WEIGHT: 293 LBS | SYSTOLIC BLOOD PRESSURE: 163 MMHG

## 2020-08-17 DIAGNOSIS — Z00.00 WELCOME TO MEDICARE PREVENTIVE VISIT: Primary | ICD-10-CM

## 2020-08-17 DIAGNOSIS — Z78.0 POSTMENOPAUSAL STATE: ICD-10-CM

## 2020-08-17 DIAGNOSIS — E11.8 TYPE 2 DIABETES MELLITUS WITH COMPLICATION, WITHOUT LONG-TERM CURRENT USE OF INSULIN (HCC): ICD-10-CM

## 2020-08-17 DIAGNOSIS — I48.20 CHRONIC ATRIAL FIBRILLATION (HCC): ICD-10-CM

## 2020-08-17 DIAGNOSIS — R30.0 DYSURIA: ICD-10-CM

## 2020-08-17 DIAGNOSIS — M25.551 RIGHT HIP PAIN: ICD-10-CM

## 2020-08-17 DIAGNOSIS — Z12.31 ENCOUNTER FOR SCREENING MAMMOGRAM FOR MALIGNANT NEOPLASM OF BREAST: ICD-10-CM

## 2020-08-17 DIAGNOSIS — I10 ESSENTIAL HYPERTENSION: ICD-10-CM

## 2020-08-17 DIAGNOSIS — E03.9 ACQUIRED HYPOTHYROIDISM: ICD-10-CM

## 2020-08-17 DIAGNOSIS — E78.00 HYPERCHOLESTEREMIA: ICD-10-CM

## 2020-08-17 PROCEDURE — G8753 SYS BP > OR = 140: HCPCS | Performed by: FAMILY MEDICINE

## 2020-08-17 PROCEDURE — 1101F PT FALLS ASSESS-DOCD LE1/YR: CPT | Performed by: FAMILY MEDICINE

## 2020-08-17 PROCEDURE — G8427 DOCREV CUR MEDS BY ELIG CLIN: HCPCS | Performed by: FAMILY MEDICINE

## 2020-08-17 PROCEDURE — 2022F DILAT RTA XM EVC RTNOPTHY: CPT | Performed by: FAMILY MEDICINE

## 2020-08-17 PROCEDURE — G8400 PT W/DXA NO RESULTS DOC: HCPCS | Performed by: FAMILY MEDICINE

## 2020-08-17 PROCEDURE — 3046F HEMOGLOBIN A1C LEVEL >9.0%: CPT | Performed by: FAMILY MEDICINE

## 2020-08-17 PROCEDURE — G9717 DOC PT DX DEP/BP F/U NT REQ: HCPCS | Performed by: FAMILY MEDICINE

## 2020-08-17 PROCEDURE — G8754 DIAS BP LESS 90: HCPCS | Performed by: FAMILY MEDICINE

## 2020-08-17 PROCEDURE — 99214 OFFICE O/P EST MOD 30 MIN: CPT | Performed by: FAMILY MEDICINE

## 2020-08-17 PROCEDURE — G8417 CALC BMI ABV UP PARAM F/U: HCPCS | Performed by: FAMILY MEDICINE

## 2020-08-17 PROCEDURE — 1090F PRES/ABSN URINE INCON ASSESS: CPT | Performed by: FAMILY MEDICINE

## 2020-08-17 PROCEDURE — G8536 NO DOC ELDER MAL SCRN: HCPCS | Performed by: FAMILY MEDICINE

## 2020-08-17 PROCEDURE — 3017F COLORECTAL CA SCREEN DOC REV: CPT | Performed by: FAMILY MEDICINE

## 2020-08-17 PROCEDURE — G9899 SCRN MAM PERF RSLTS DOC: HCPCS | Performed by: FAMILY MEDICINE

## 2020-08-17 PROCEDURE — G0402 INITIAL PREVENTIVE EXAM: HCPCS | Performed by: FAMILY MEDICINE

## 2020-08-17 RX ORDER — TRAMADOL HYDROCHLORIDE 50 MG/1
50 TABLET ORAL
Qty: 20 TAB | Refills: 0 | Status: SHIPPED | OUTPATIENT
Start: 2020-08-17 | End: 2020-08-20

## 2020-08-17 RX ORDER — FUROSEMIDE 40 MG/1
40 TABLET ORAL DAILY
Qty: 90 TAB | Refills: 1 | Status: SHIPPED | OUTPATIENT
Start: 2020-08-17 | End: 2020-11-05 | Stop reason: SDUPTHER

## 2020-08-17 RX ORDER — DULAGLUTIDE 1.5 MG/.5ML
1.5 INJECTION, SOLUTION SUBCUTANEOUS
Qty: 13 SYRINGE | Refills: 1 | Status: SHIPPED | OUTPATIENT
Start: 2020-08-17 | End: 2021-01-12 | Stop reason: SDUPTHER

## 2020-08-17 RX ORDER — ISOSORBIDE MONONITRATE 30 MG/1
TABLET, EXTENDED RELEASE ORAL
COMMUNITY
Start: 2020-06-22 | End: 2021-01-12 | Stop reason: SDUPTHER

## 2020-08-17 NOTE — PROGRESS NOTES
This is a \"Welcome to [employer]"  Initial Preventive Physical Examination (IPPE) providing Personalized Prevention Plan Services (Performed in the first 12 months of enrollment)    I have reviewed the patient's medical history in detail and updated the computerized patient record. History     Past Medical History:   Diagnosis Date    [facility] fibrillation ([facility]     Chronic renal failure     Diabetes ([facility]     Hearing loss     Hypertension     Migraine     Morbid obesity (HCC)     Osteoarthritis     Sleep apnea     no CPAP    Thyroid disease       Past Surgical History:   Procedure Laterality Date    CARDIAC SURG PROCEDURE UNLIST      cardioversion 11/16/2018    CARDIAC SURG PROCEDURE UNLIST      ablation, 2016    COLONOSCOPY N/A 12/4/2018    COLONOSCOPY performed by Yuli Boykin MD at [address], normal, repeat 5 yeasr    HX DILATION AND CURETTAGE      HX HYSTERECTOMY       Current Outpatient Medications   Medication Sig Dispense Refill    rivaroxaban (Xarelto) 20 mg tab tablet Take 1 Tab by mouth daily. 90 Tab 1    dulaglutide (Trulicity) 1.5 HN/5.0 mL sub-q pen 0.5 mL by SubCUTAneous route every seven (7) days. Indications: type 2 diabetes mellitus 13 Syringe 1    furosemide (LASIX) 40 mg tablet Take 1 Tab by mouth daily. 90 Tab 1    traMADoL (ULTRAM) 50 mg tablet Take 1 Tab by mouth every six (6) hours as needed for Pain for up to 3 days. Max Daily Amount: 200 mg. 20 Tab 0    amiodarone (CORDARONE) 200 mg tablet TAKE 1 TABLET DAILY 90 Tab 1    amLODIPine (NORVASC) 10 mg tablet TAKE 1 TABLET DAILY 90 Tab 1    losartan (COZAAR) 100 mg tablet TAKE 1 TABLET DAILY 90 Tab 1    celecoxib (CELEBREX) 100 mg capsule TAKE 1 CAPSULE TWICE A  Cap 1    levothyroxine (SYNTHROID) 137 mcg tablet TAKE 1 TABLET DAILY BEFORE BREAKFAST 90 Tab 1    atorvastatin (LIPITOR) 10 mg tablet TAKE 1 TABLET DAILY 90 Tab 1    acetaminophen (TYLENOL EXTRA STRENGTH PO) Take  by mouth.       isosorbide mononitrate ER (IMDUR) 30 mg tablet TAKE 1 TABLET BY MOUTH ONCE DAILY IN THE MORNING       Allergies   Allergen Reactions    Ibuprofen Hives       Family History   Problem Relation Age of Onset    Heart Disease Mother     Hypertension Mother     Diabetes Mother      Social History     Tobacco Use    Smoking status: Former Smoker     Years: 1.00     Types: Cigarettes    Smokeless tobacco: Never Used   Substance Use Topics    Alcohol use: No       Depression Risk Screen     3 most recent PHQ Screens 2/5/2019   Little interest or pleasure in doing things Not at all   Feeling down, depressed, irritable, or hopeless Several days   Total Score PHQ 2 1       Alcohol Risk Factor Screening:   Do you average 1 drink per night or more than 7 drinks a week:  No    On any one occasion in the past three months have you have had more than 3 drinks containing alcohol:  No      Functional Ability and Level of Safety   Diet: The patient is prescribed and follows a special diet. Hearing: The patient needs further evaluation. Vision Screening:  Vision is good. blurry vision, last eye appt November 2019  No exam data present     Activities of Daily Living: The home contains: grab bars  Patient does total self care     Ambulation: with mild difficulty and pain right side. walking and sitting are painful     Exercise level: sedentary     Fall Risk Screen:  Fall Risk Assessment, last 12 mths 8/17/2020   Able to walk? Yes   Fall in past 12 months? No   Fall with injury? -   Number of falls in past 12 months -   Fall Risk Score -     Abuse Screen:  Patient is not abused       Screening EKG   EKG order placed: No  Has A fib  Patient Care Team   Patient Care Team:  Buddy Jeter MD as PCP - General (Family Medicine)  Buddy Jeter MD as PCP - [facility] Empaneled Provider     End of Life Planning   Advanced care planning directives were discussed with the patient and /or family/caregiver.    Handout given.    Assessment/Plan   Education and counseling provided:  Are appropriate based on today's review and evaluation  End-of-Life planning (with patient's consent)    DM2  Trying to stick to diet. Has not lost weight  Continues trulicity  Due for eye exam, reminded to get yearly. Atrial fib  Remains on xarelto. Currently no sx  Knows to watch for bleeding in urine or stool    Hypertension  Still not at goal  Edema is worse    Bad urinary odor but no pain  Wants this checked. ROSROS:  General/Constitutional:  No headache, fever, fatigue, weight loss or weight gain     Eyes:  No redness, pruritis, pain, visual changes, swelling, or discharge    Ears:  No pain, loss or changes in hearin    Neck:  No swelling, masses, stiffness, pain, or limited movemen    Cardiac:   No chest pain, racing heartbeat or palpitations  Respiratory:  No cough ongoing dyspnea on exertion. GI:  No nausea/vomiting, diarrhea, abdominal pain, bloody or dark stools     :  No dysuria or  hematuria   Right back and hip pain since recent fall. Cannot sit or stand well. No joint swelling or redness. Neurological:  No loss of consciousness, dizziness, seizures, dysarthria, cognitive changes, memory changes,  problems with balance, or unilateral weakness    Skin: No rash     General Well appearing, A&O X 4  Neck without nodes normal thyroid  Lungs clear to ausculation  CV RRR, No M, R, or G.  1+ bilateral edema. Neuro Normal Speech  Psych Oriented to person place and time with normal affect and mood. No hallucinations or abnormal thought  Walking with limp. Hard to sit due to contusion right hip. Good ROM hip      Diabetic Foot Exam:  Protective sensation is intact bilaterally. Pedal pulses cannot palpate but good cap refill.   L foot skin inspection:  normal skin and soft tissue with no gross edema or evidence of acute injury or foot ulcer  R foot skin inspection:  skin and soft tissue appear normal with no significant edema or evidence of acute injury or foot ulcer     Diagnoses and all orders for this visit:    1. Welcome to Medicare preventive visit  States she has had second Shingrix  Needs mammogram and DEXA  Colonoscopy up to date. 2. Chronic atrial fibrillation (HCC)  -     rivaroxaban (Xarelto) 20 mg tab tablet; Take 1 Tab by mouth daily. 3. Type 2 diabetes mellitus with complication, without long-term current use of insulin (HCC)  -     dulaglutide (Trulicity) 1.5 FQ/1.9 mL sub-q pen; 0.5 mL by SubCUTAneous route every seven (7) days. Indications: type 2 diabetes mellitus  -     CBC WITH AUTOMATED DIFF; Future  -     HEMOGLOBIN A1C WITH EAG; Future  -      DIABETES FOOT EXAM    4. Essential hypertension not at goal  -     furosemide (LASIX) 40 mg tablet; Take 1 Tab by mouth daily.  -     METABOLIC PANEL, BASIC; Future  -     MICROALBUMIN, UR, RAND W/ MICROALB/CREAT RATIO; Future    5. Acquired hypothyroidism  -     TSH 3RD GENERATION; Future    6. Hypercholesteremia  -     HEPATIC FUNCTION PANEL; Future  -     LIPID PANEL; Future    7. Dysuria  -     URINALYSIS W/ RFLX MICROSCOPIC; Future  -     CULTURE, URINE; Future    8. Encounter for screening mammogram for malignant neoplasm of breast  -     NICA MAMMO BI SCREENING INCL CAD; Future    9. Postmenopausal state  -     DEXA BONE DENSITY STUDY AXIAL; Future    10. Right hip pain likely of lumbar origin  -     traMADoL (ULTRAM) 50 mg tablet; Take 1 Tab by mouth every six (6) hours as needed for Pain for up to 3 days. Max Daily Amount: 200 mg.  FU for repeat exam if no better 7-10 days.     Health Maintenance Due   Topic Date Due    Shingrix Vaccine Age 49> (2 of 2) 09/24/2019    Bone Densitometry (Dexa) Screening  01/04/2020    Medicare Yearly Exam  01/29/2020    Foot Exam Q1  05/15/2020    Influenza Age 5 to Adult  08/01/2020

## 2020-08-17 NOTE — PATIENT INSTRUCTIONS
Medicare Wellness Visit, Female The best way to live healthy is to have a lifestyle where you eat a well-balanced diet, exercise regularly, limit alcohol use, and quit all forms of tobacco/nicotine, if applicable. Regular preventive services are another way to keep healthy. Preventive services (vaccines, screening tests, monitoring & exams) can help personalize your care plan, which helps you manage your own care. Screening tests can find health problems at the earliest stages, when they are easiest to treat. [facility] follows the current, evidence-based guidelines published by the [facility] ([facility]) when recommending preventive services for our patients. Because we follow these guidelines, sometimes recommendations change over time as research supports it. (For example, mammograms used to be recommended annually. Even though Medicare will still pay for an annual mammogram, the newer guidelines recommend a mammogram every two years for women of average risk). Of course, you and your doctor may decide to screen more often for some diseases, based on your risk and your co-morbidities (chronic disease you are already diagnosed with). Preventive services for you include: - Medicare offers their members a free annual wellness visit, which is time for you and your primary care provider to discuss and plan for your preventive service needs. Take advantage of this benefit every year! 
-All adults over the age of 72 should receive the recommended pneumonia vaccines. Current USPSTF guidelines recommend a series of two vaccines for the best pneumonia protection.  
-All adults should have a flu vaccine yearly and a tetanus vaccine every 10 years.  
-All adults age 48 and older should receive the shingles vaccines (series of two vaccines). -All adults age 38-68 who are overweight should have a diabetes screening test once every three years. -All adults born between [birth year] and 1965 should be screened once for Hepatitis C. 
-Other screening tests and preventive services for persons with diabetes include: an eye exam to screen for diabetic retinopathy, a kidney function test, a foot exam, and stricter control over your cholesterol.  
-Cardiovascular screening for adults with routine risk involves an electrocardiogram (ECG) at intervals determined by your doctor.  
-Colorectal cancer screenings should be done for adults age 54-65 with no increased risk factors for colorectal cancer. There are a number of acceptable methods of screening for this type of cancer. Each test has its own benefits and drawbacks. Discuss with your doctor what is most appropriate for you during your annual wellness visit. The different tests include: colonoscopy (considered the best screening method), a fecal occult blood test, a fecal DNA test, and sigmoidoscopy. 
 
-A bone mass density test is recommended when a woman turns 65 to screen for osteoporosis. This test is only recommended one time, as a screening. Some providers will use this same test as a disease monitoring tool if you already have osteoporosis. -Breast cancer screenings are recommended every other year for women of normal risk, age 54-69. 
-Cervical cancer screenings for women over age 72 are only recommended with certain risk factors. Here is a list of your current Health Maintenance items (your personalized list of preventive services) with a due date: 
Health Maintenance Due Topic Date Due  Shingles Vaccine (2 of 2) 09/24/2019  Bone Mineral Density   01/04/2020 [facility] Annual Well Visit  01/29/2020 [facility] Diabetic Foot Care  05/15/2020  Flu Vaccine  08/01/2020

## 2020-08-17 NOTE — PROGRESS NOTES
Patient stated name &     Chief Complaint   Patient presents with    Complete Physical        Health Maintenance Due   Topic    Shingrix Vaccine Age 49> (2 of 2)    Bone Densitometry (Dexa) Screening     Medicare Yearly Exam     Foot Exam Q1     Influenza Age 5 to Adult        Wt Readings from Last 3 Encounters:   20 (!) 352 lb 6.4 oz (159.8 kg)   20 (!) 353 lb (160.1 kg)   20 (!) 353 lb (160.1 kg)     Temp Readings from Last 3 Encounters:   20 97.9 °F (36.6 °C) (Oral)   20 98 °F (36.7 °C) (Oral)   20 97.8 °F (36.6 °C) (Oral)     BP Readings from Last 3 Encounters:   20 161/64   20 172/47   20 149/63     Pulse Readings from Last 3 Encounters:   20 61   20 (!) 54   20 (!) 50         Learning Assessment:  :     Learning Assessment 2019   PRIMARY LEARNER Patient Patient   PRIMARY LANGUAGE ENGLISH ENGLISH   LEARNER PREFERENCE PRIMARY READING READING   ANSWERED BY patient [facility]   RELATIONSHIP SELF SELF       Depression Screening:  :     3 most recent PHQ Screens 2019   Little interest or pleasure in doing things Not at all   Feeling down, depressed, irritable, or hopeless Several days   Total Score PHQ 2 1       Fall Risk Assessment:  :     Fall Risk Assessment, last 12 mths 2020   Able to walk? Yes   Fall in past 12 months? No   Fall with injury? -   Number of falls in past 12 months -   Fall Risk Score -       Abuse Screening:  :     Abuse Screening Questionnaire 2019   Do you ever feel afraid of your partner? N N   Are you in a relationship with someone who physically or mentally threatens you? N N   Is it safe for you to go home? Y Y       Coordination of Care Questionnaire:  :     1) Have you been to an emergency room, urgent care clinic since your last visit? No    Hospitalized since your last visit?  No             2) Have you seen or consulted any other health care providers outside of [facility] since your last visit? No  (Include any pap smears or colon screenings in this section.)    Patient is accompanied by self I have received verbal consent from Maykel Leonardo to discuss any/all medical information while they are present in the room.

## 2020-08-21 ENCOUNTER — TELEPHONE (OUTPATIENT)
Dept: FAMILY MEDICINE CLINIC | Age: 65
End: 2020-08-21

## 2020-08-21 LAB
ALBUMIN SERPL-MCNC: 4.2 G/DL (ref 3.8–4.8)
ALBUMIN/CREAT UR: 24 MG/G CREAT (ref 0–29)
ALP SERPL-CCNC: 84 IU/L (ref 39–117)
ALT SERPL-CCNC: 10 IU/L (ref 0–32)
APPEARANCE UR: ABNORMAL
AST SERPL-CCNC: 16 IU/L (ref 0–40)
BACTERIA UR CULT: ABNORMAL
BASOPHILS # BLD AUTO: 0.1 X10E3/UL (ref 0–0.2)
BASOPHILS NFR BLD AUTO: 1 %
BILIRUB DIRECT SERPL-MCNC: 0.18 MG/DL (ref 0–0.4)
BILIRUB SERPL-MCNC: 0.6 MG/DL (ref 0–1.2)
BILIRUB UR QL STRIP: NEGATIVE
BUN SERPL-MCNC: 15 MG/DL (ref 8–27)
BUN/CREAT SERPL: 16 (ref 12–28)
CALCIUM SERPL-MCNC: 9.3 MG/DL (ref 8.7–10.3)
CHLORIDE SERPL-SCNC: 103 MMOL/L (ref 96–106)
CHOLEST SERPL-MCNC: 182 MG/DL (ref 100–199)
CO2 SERPL-SCNC: 23 MMOL/L (ref 20–29)
COLOR UR: YELLOW
CREAT SERPL-MCNC: 0.93 MG/DL (ref 0.57–1)
CREAT UR-MCNC: 142.9 MG/DL
EOSINOPHIL # BLD AUTO: 0.2 X10E3/UL (ref 0–0.4)
EOSINOPHIL NFR BLD AUTO: 3 %
ERYTHROCYTE [DISTWIDTH] IN BLOOD BY AUTOMATED COUNT: 14.2 % (ref 11.7–15.4)
EST. AVERAGE GLUCOSE BLD GHB EST-MCNC: 131 MG/DL
GLUCOSE SERPL-MCNC: 111 MG/DL (ref 65–99)
GLUCOSE UR QL: NEGATIVE
HBA1C MFR BLD: 6.2 % (ref 4.8–5.6)
HCT VFR BLD AUTO: 39.9 % (ref 34–46.6)
HDLC SERPL-MCNC: 51 MG/DL
HGB BLD-MCNC: 12.7 G/DL (ref 11.1–15.9)
HGB UR QL STRIP: NEGATIVE
IMM GRANULOCYTES # BLD AUTO: 0 X10E3/UL (ref 0–0.1)
IMM GRANULOCYTES NFR BLD AUTO: 0 %
KETONES UR QL STRIP: NEGATIVE
LDLC SERPL CALC-MCNC: 109 MG/DL (ref 0–99)
LEUKOCYTE ESTERASE UR QL STRIP: NEGATIVE
LYMPHOCYTES # BLD AUTO: 1.7 X10E3/UL (ref 0.7–3.1)
LYMPHOCYTES NFR BLD AUTO: 29 %
MCH RBC QN AUTO: 26.3 PG (ref 26.6–33)
MCHC RBC AUTO-ENTMCNC: 31.8 G/DL (ref 31.5–35.7)
MCV RBC AUTO: 83 FL (ref 79–97)
MICRO URNS: ABNORMAL
MICROALBUMIN UR-MCNC: 34.6 UG/ML
MONOCYTES # BLD AUTO: 0.5 X10E3/UL (ref 0.1–0.9)
MONOCYTES NFR BLD AUTO: 8 %
NEUTROPHILS # BLD AUTO: 3.6 X10E3/UL (ref 1.4–7)
NEUTROPHILS NFR BLD AUTO: 59 %
NITRITE UR QL STRIP: NEGATIVE
PH UR STRIP: 5.5 [PH] (ref 5–7.5)
PLATELET # BLD AUTO: 239 X10E3/UL (ref 150–450)
POTASSIUM SERPL-SCNC: 4.1 MMOL/L (ref 3.5–5.2)
PROT SERPL-MCNC: 6.7 G/DL (ref 6–8.5)
PROT UR QL STRIP: NEGATIVE
RBC # BLD AUTO: 4.82 X10E6/UL (ref 3.77–5.28)
SODIUM SERPL-SCNC: 141 MMOL/L (ref 134–144)
SP GR UR: 1.02 (ref 1–1.03)
TRIGL SERPL-MCNC: 109 MG/DL (ref 0–149)
TSH SERPL DL<=0.005 MIU/L-ACNC: 1.82 UIU/ML (ref 0.45–4.5)
UROBILINOGEN UR STRIP-MCNC: 1 MG/DL (ref 0.2–1)
VLDLC SERPL CALC-MCNC: 22 MG/DL (ref 5–40)
WBC # BLD AUTO: 6 X10E3/UL (ref 3.4–10.8)

## 2020-08-21 RX ORDER — CEPHALEXIN 500 MG/1
500 CAPSULE ORAL 4 TIMES DAILY
Qty: 40 CAP | Refills: 0 | Status: SHIPPED | OUTPATIENT
Start: 2020-08-21 | End: 2020-08-31

## 2020-08-21 NOTE — PROGRESS NOTES
Call her that she has a UTI based on the culture and I have sent in an antibiotic for her. Your labs look very good except your urine culture shows that you do have an infection. I have sent in an antibiotic to your pharmacy that should work. Your diabetic control and other tests look very good.     Stick to your medications and diet  [facility]

## 2020-08-21 NOTE — TELEPHONE ENCOUNTER
----- Message from Padmini Rosales MD sent at 8/21/2020 10:17 AM EDT -----  Call her that she has a UTI based on the culture and I have sent in an antibiotic for her. Your labs look very good except your urine culture shows that you do have an infection. I have sent in an antibiotic to your pharmacy that should work. Your diabetic control and other tests look very good.     Stick to your medications and diet  [facility]

## 2020-08-21 NOTE — TELEPHONE ENCOUNTER
TC- to Mrs. Moreno Vera with Dr Perry Betts message name and  verified.  Patient verbally understood and will pick rx at her pharmacy

## 2020-08-24 ENCOUNTER — OFFICE VISIT (OUTPATIENT)
Dept: FAMILY MEDICINE CLINIC | Age: 65
End: 2020-08-24
Payer: MEDICARE

## 2020-08-24 VITALS
BODY MASS INDEX: 44.41 KG/M2 | HEIGHT: 68 IN | SYSTOLIC BLOOD PRESSURE: 174 MMHG | OXYGEN SATURATION: 94 % | RESPIRATION RATE: 16 BRPM | HEART RATE: 62 BPM | WEIGHT: 293 LBS | DIASTOLIC BLOOD PRESSURE: 63 MMHG | TEMPERATURE: 98.4 F

## 2020-08-24 DIAGNOSIS — M25.551 ACUTE HIP PAIN, RIGHT: Primary | ICD-10-CM

## 2020-08-24 PROCEDURE — 3017F COLORECTAL CA SCREEN DOC REV: CPT | Performed by: FAMILY MEDICINE

## 2020-08-24 PROCEDURE — G8427 DOCREV CUR MEDS BY ELIG CLIN: HCPCS | Performed by: FAMILY MEDICINE

## 2020-08-24 PROCEDURE — G8754 DIAS BP LESS 90: HCPCS | Performed by: FAMILY MEDICINE

## 2020-08-24 PROCEDURE — 3288F FALL RISK ASSESSMENT DOCD: CPT | Performed by: FAMILY MEDICINE

## 2020-08-24 PROCEDURE — G8536 NO DOC ELDER MAL SCRN: HCPCS | Performed by: FAMILY MEDICINE

## 2020-08-24 PROCEDURE — G9899 SCRN MAM PERF RSLTS DOC: HCPCS | Performed by: FAMILY MEDICINE

## 2020-08-24 PROCEDURE — G9717 DOC PT DX DEP/BP F/U NT REQ: HCPCS | Performed by: FAMILY MEDICINE

## 2020-08-24 PROCEDURE — G8400 PT W/DXA NO RESULTS DOC: HCPCS | Performed by: FAMILY MEDICINE

## 2020-08-24 PROCEDURE — 1100F PTFALLS ASSESS-DOCD GE2>/YR: CPT | Performed by: FAMILY MEDICINE

## 2020-08-24 PROCEDURE — 1090F PRES/ABSN URINE INCON ASSESS: CPT | Performed by: FAMILY MEDICINE

## 2020-08-24 PROCEDURE — G8417 CALC BMI ABV UP PARAM F/U: HCPCS | Performed by: FAMILY MEDICINE

## 2020-08-24 PROCEDURE — 99214 OFFICE O/P EST MOD 30 MIN: CPT | Performed by: FAMILY MEDICINE

## 2020-08-24 PROCEDURE — G8753 SYS BP > OR = 140: HCPCS | Performed by: FAMILY MEDICINE

## 2020-08-24 NOTE — PROGRESS NOTES
1. Have you been to the ER, urgent care clinic since your last visit? Hospitalized since your last visit? No    2. Have you seen or consulted any other health care providers outside of the [address] [place] [address] since your last visit? Include any pap smears or colon screening. No     Chief Complaint   Patient presents with    Fall     Patient reports right hip pain when active. Visit Vitals  /63 (BP 1 Location: Left arm, BP Patient Position: Sitting)   Pulse 62   Temp 98.4 °F (36.9 °C) (Oral)   Resp 16   Ht 5' 8\" (1.727 m)   Wt 349 lb (158.3 kg)   SpO2 94%   BMI 53.07 kg/m²       Pharmacy verified.    [id number] [facility]

## 2020-08-24 NOTE — PROGRESS NOTES
Assessment and Plan    1. Acute hip pain, right  Likely of lumbar origin  Check xrays for other possibilities. Continue tramadol  If not better in 2 weeks or fracture by xray, to ortho. - XR HIP RT W OR WO PELV 2-3 VWS; Future  - XR SPINE LUMB 2 OR 3 V; Future      Follow-up and Dispositions    · Return in about 2 weeks (around 9/7/2020) for follow up hip pain. Diagnosis and plan discussed with patient who verbillized understanding. History of present illness: Jessica Lawson is a 72 y.o. female presenting for Fall (Patient reports right hip pain when active. )    Hip pain  Pain hip to toe if walking or driving cars  [place] end of July. Fell backward into floor directly on hip. History of back issues. No history of hip joint problems. Review of Systems   Constitutional: Negative for chills and fever. HENT: Negative for congestion and sore throat. Respiratory: Negative for cough, sputum production and wheezing. Cardiovascular: Positive for leg swelling. Negative for chest pain. Musculoskeletal: Positive for back pain, falls and joint pain. Psychiatric/Behavioral: Negative.           Past Medical History:   Diagnosis Date    Atrial fibrillation (HCC)     Chronic renal failure     Diabetes ([facility] [address]     Hearing loss     Hypertension     Migraine     Morbid obesity (HCC)     Osteoarthritis     Sleep apnea     no CPAP    Thyroid disease      Past Surgical History:   Procedure Laterality Date    CARDIAC SURG PROCEDURE UNLIST      cardioversion 11/16/2018    CARDIAC SURG PROCEDURE UNLIST      ablation, 2016    COLONOSCOPY N/A 12/4/2018    COLONOSCOPY performed by Audi Gross MD at [facility] [address], normal, repeat 5 yeasr    HX DILATION AND CURETTAGE      HX HYSTERECTOMY       Family History   Problem Relation Age of Onset    Heart Disease Mother     Hypertension Mother     Diabetes Mother      Social History     Socioeconomic History    Marital status:      Spouse name: Not on file    Number of children: Not on file    Years of education: Not on file    Highest education level: Not on file   Occupational History    Not on file   Social Needs    Financial resource strain: Not on file    Food insecurity     Worry: Not on file     Inability: Not on file    Transportation needs     Medical: Not on file     Non-medical: Not on file   Tobacco Use    Smoking status: Former Smoker     Years: 1.00     Types: Cigarettes    Smokeless tobacco: Never Used   Substance and Sexual Activity    Alcohol use: No    Drug use: No    Sexual activity: Not Currently   Lifestyle    Physical activity     Days per week: Not on file     Minutes per session: Not on file    Stress: Not on file   Relationships    Social connections     Talks on phone: Not on file     Gets together: Not on file     Attends Gnosticist service: Not on file     Active member of club or organization: Not on file     Attends meetings of clubs or organizations: Not on file     Relationship status: Not on file    Intimate partner violence     Fear of current or ex partner: Not on file     Emotionally abused: Not on file     Physically abused: Not on file     Forced sexual activity: Not on file   Other Topics Concern    Not on file   Social History Narrative    Not on file         Prior to Admission medications    Medication Sig Start Date End Date Taking? Authorizing Provider   cephALEXin (KEFLEX) 500 mg capsule Take 1 Cap by mouth four (4) times daily for 10 days. 8/21/20 8/31/20 Yes May Crhistopher MD   isosorbide mononitrate ER (IMDUR) 30 mg tablet TAKE 1 TABLET BY MOUTH ONCE DAILY IN THE MORNING 6/22/20  Yes Provider, Historical   rivaroxaban (Xarelto) 20 mg tab tablet Take 1 Tab by mouth daily. 8/17/20  Yes May Christopher MD   dulaglutide (Trulicity) 1.5 XZ/5.7 mL sub-q pen 0.5 mL by SubCUTAneous route every seven (7) days.  Indications: type 2 diabetes mellitus 8/17/20  Yes May Christopher MD furosemide (LASIX) 40 mg tablet Take 1 Tab by mouth daily. 8/17/20  Yes Thais Fletcher MD   amiodarone (CORDARONE) 200 mg tablet TAKE 1 TABLET DAILY 6/22/20  Yes Thais Fletcher MD   amLODIPine (NORVASC) 10 mg tablet TAKE 1 TABLET DAILY 6/22/20  Yes Thais Fletcher MD   losartan (COZAAR) 100 mg tablet TAKE 1 TABLET DAILY 6/22/20  Yes Thais Fletcher MD   celecoxib (CELEBREX) 100 mg capsule TAKE 1 CAPSULE TWICE A DAY 6/22/20  Yes Thais Fletcher MD   levothyroxine (SYNTHROID) 137 mcg tablet TAKE 1 TABLET DAILY BEFORE BREAKFAST 6/22/20  Yes Thais Fletcher MD   atorvastatin (LIPITOR) 10 mg tablet TAKE 1 TABLET DAILY 6/22/20  Yes Thais Fletcher MD   acetaminophen (TYLENOL EXTRA STRENGTH PO) Take  by mouth. Yes Provider, Historical        Allergies   Allergen Reactions    Ibuprofen Hives       Vitals:    08/24/20 1134   BP: 174/63   Pulse: 62   Resp: 16   Temp: 98.4 °F (36.9 °C)   TempSrc: Oral   SpO2: 94%   Weight: 349 lb (158.3 kg)   Height: 5' 8\" (1.727 m)     Body mass index is 53.07 kg/m². Objective  Physical Exam  BACK EXAM:   Inspection: normal skin, soft tissue and bony appearance with normal cervical and lumbar lordotic curve, no gross edema or evidence of acute injury;   Palpation: normal palpation of the bony posterior spinous processes, the sacral spine, iliac crest, posterior iliac spine and coccyx  Tender right buttock to palpation, no bruising.   Neurovascular: deep tendon reflexes: 2/4 left patellar,  2/4 right patellar,  2/4 left Achilles,  2/4 right Achilles, Symmetric Reflexes without clonus;   Muscular Strength: 5/5 bilateral quadriceps;  5/5 bilateral tibialis anterior;  5/5 bilateral extensor hallucis longus;  5/5 bilateral extensor digitorum longus and brevis;  5/5 bilateral peroneus longus and brevis;  5/5 bilateral gastrocnemius-soleus;   Range of Motion: some pain with lateral flexion and forward flexion   (+)right straight leg raise   Variable pain on rotation of right hip. Not always having pain with internal rotation  Painful to bear weight.   Full Range of motion of both hips without pain or limitation

## 2020-08-27 ENCOUNTER — PATIENT MESSAGE (OUTPATIENT)
Dept: FAMILY MEDICINE CLINIC | Age: 65
End: 2020-08-27

## 2020-08-27 DIAGNOSIS — I48.20 CHRONIC ATRIAL FIBRILLATION (HCC): ICD-10-CM

## 2020-08-27 NOTE — TELEPHONE ENCOUNTER
08/27/20  5:34 PM    1. Chronic atrial fibrillation (HCC)    - rivaroxaban (Xarelto) 20 mg tab tablet; Take 1 Tab by mouth daily. Dispense: 90 Tab;  Refill: 1      Sinan Godinez MD

## 2020-08-31 DIAGNOSIS — M25.551 ACUTE HIP PAIN, RIGHT: Primary | ICD-10-CM

## 2020-08-31 RX ORDER — TRAMADOL HYDROCHLORIDE 50 MG/1
50 TABLET ORAL
Qty: 30 TAB | Refills: 0 | Status: SHIPPED | OUTPATIENT
Start: 2020-08-31 | End: 2020-09-03

## 2020-08-31 NOTE — TELEPHONE ENCOUNTER
08/31/20  6:07 PM    1. Acute hip pain, right  Left message on cell that we should get her in to ortho. She needs to let me know if that is how she wants to proceed. - traMADoL (ULTRAM) 50 mg tablet; Take 1 Tab by mouth every six (6) hours as needed for Pain for up to 3 days. Max Daily Amount: 200 mg. Dispense: 30 Tab;  Refill: 0      Missy Stafford MD

## 2020-09-01 DIAGNOSIS — M25.551 ACUTE HIP PAIN, RIGHT: Primary | ICD-10-CM

## 2020-09-01 NOTE — PROGRESS NOTES
09/01/20  4:11 PM    1. Acute hip pain, right  Dr. Larry Strickland for likely sciatica causing hip pain.   - Denver Bue, MD

## 2020-09-29 ENCOUNTER — OFFICE VISIT (OUTPATIENT)
Dept: FAMILY MEDICINE CLINIC | Age: 65
End: 2020-09-29
Payer: MEDICARE

## 2020-09-29 VITALS
HEART RATE: 67 BPM | DIASTOLIC BLOOD PRESSURE: 65 MMHG | BODY MASS INDEX: 44.41 KG/M2 | HEIGHT: 68 IN | SYSTOLIC BLOOD PRESSURE: 107 MMHG | OXYGEN SATURATION: 95 % | WEIGHT: 293 LBS | RESPIRATION RATE: 20 BRPM | TEMPERATURE: 98.1 F

## 2020-09-29 DIAGNOSIS — M25.551 ACUTE HIP PAIN, RIGHT: ICD-10-CM

## 2020-09-29 DIAGNOSIS — I10 ESSENTIAL HYPERTENSION: ICD-10-CM

## 2020-09-29 DIAGNOSIS — I48.91 ATRIAL FIBRILLATION, UNSPECIFIED TYPE (HCC): ICD-10-CM

## 2020-09-29 DIAGNOSIS — Z23 NEEDS FLU SHOT: Primary | ICD-10-CM

## 2020-09-29 PROCEDURE — G8536 NO DOC ELDER MAL SCRN: HCPCS | Performed by: FAMILY MEDICINE

## 2020-09-29 PROCEDURE — G8752 SYS BP LESS 140: HCPCS | Performed by: FAMILY MEDICINE

## 2020-09-29 PROCEDURE — G8754 DIAS BP LESS 90: HCPCS | Performed by: FAMILY MEDICINE

## 2020-09-29 PROCEDURE — 90694 VACC AIIV4 NO PRSRV 0.5ML IM: CPT | Performed by: FAMILY MEDICINE

## 2020-09-29 PROCEDURE — 3288F FALL RISK ASSESSMENT DOCD: CPT | Performed by: FAMILY MEDICINE

## 2020-09-29 PROCEDURE — 99214 OFFICE O/P EST MOD 30 MIN: CPT | Performed by: FAMILY MEDICINE

## 2020-09-29 PROCEDURE — G8417 CALC BMI ABV UP PARAM F/U: HCPCS | Performed by: FAMILY MEDICINE

## 2020-09-29 PROCEDURE — 1100F PTFALLS ASSESS-DOCD GE2>/YR: CPT | Performed by: FAMILY MEDICINE

## 2020-09-29 PROCEDURE — G0008 ADMIN INFLUENZA VIRUS VAC: HCPCS | Performed by: FAMILY MEDICINE

## 2020-09-29 PROCEDURE — G8427 DOCREV CUR MEDS BY ELIG CLIN: HCPCS | Performed by: FAMILY MEDICINE

## 2020-09-29 PROCEDURE — 1090F PRES/ABSN URINE INCON ASSESS: CPT | Performed by: FAMILY MEDICINE

## 2020-09-29 PROCEDURE — G8400 PT W/DXA NO RESULTS DOC: HCPCS | Performed by: FAMILY MEDICINE

## 2020-09-29 PROCEDURE — G9899 SCRN MAM PERF RSLTS DOC: HCPCS | Performed by: FAMILY MEDICINE

## 2020-09-29 PROCEDURE — 3017F COLORECTAL CA SCREEN DOC REV: CPT | Performed by: FAMILY MEDICINE

## 2020-09-29 PROCEDURE — G9717 DOC PT DX DEP/BP F/U NT REQ: HCPCS | Performed by: FAMILY MEDICINE

## 2020-09-29 RX ORDER — TRAMADOL HYDROCHLORIDE 50 MG/1
50 TABLET ORAL
Qty: 30 TAB | Refills: 0 | Status: SHIPPED | OUTPATIENT
Start: 2020-09-29 | End: 2020-10-02

## 2020-09-29 NOTE — PATIENT INSTRUCTIONS
Vaccine Information Statement Influenza (Flu) Vaccine (Inactivated or Recombinant): What You Need to Know Many Vaccine Information Statements are available in [place] and other languages. See www.immunize.org/vis Hojas de información sobre vacunas están disponibles en español y en muchos otros idiomas. Visite www.immunize.org/vis 1. Why get vaccinated? Influenza vaccine can prevent influenza (flu). Flu is a contagious disease that spreads around the [facility] every year, usually between October and May. Anyone can get the flu, but it is more dangerous for some people. Infants and young children, people 72years of age and older, pregnant women, and people with certain health conditions or a weakened immune system are at greatest risk of flu complications. Pneumonia, bronchitis, sinus infections and ear infections are examples of flu-related complications. If you have a medical condition, such as heart disease, cancer or diabetes, flu can make it worse. Flu can cause fever and chills, sore throat, muscle aches, fatigue, cough, headache, and runny or stuffy nose. Some people may have vomiting and diarrhea, though this is more common in children than adults. Each year thousands of people in the [facility] die from flu, and many more are hospitalized. Flu vaccine prevents millions of illnesses and flu-related visits to the doctor each year. 2. Influenza vaccines CDC recommends everyone 10months of age and older get vaccinated every flu season. Children 6 months through 6years of age may need 2 doses during a single flu season. Everyone else needs only 1 dose each flu season. It takes about 2 weeks for protection to develop after vaccination. There are many flu viruses, and they are always changing. Each year a new flu vaccine is made to protect against three or four viruses that are likely to cause disease in the upcoming flu season.  Even when the vaccine doesnt exactly match these viruses, it may still provide some protection. Influenza vaccine does not cause flu. Influenza vaccine may be given at the same time as other vaccines. 3. Talk with your health care provider Tell your vaccine provider if the person getting the vaccine: 
 Has had an allergic reaction after a previous dose of influenza vaccine, or has any severe, life-threatening allergies.  Has ever had Guillain-Barré Syndrome (also called GBS). In some cases, your health care provider may decide to postpone influenza vaccination to a future visit. People with minor illnesses, such as a cold, may be vaccinated. People who are moderately or severely ill should usually wait until they recover before getting influenza vaccine. Your health care provider can give you more information. 4. Risks of a reaction  Soreness, redness, and swelling where shot is given, fever, muscle aches, and headache can happen after influenza vaccine.  There may be a very small increased risk of Guillain-Barré Syndrome (GBS) after inactivated influenza vaccine (the flu shot). Adarsh Bloxom children who get the flu shot along with pneumococcal vaccine (PCV13), and/or DTaP vaccine at the same time might be slightly more likely to have a seizure caused by fever. Tell your health care provider if a child who is getting flu vaccine has ever had a seizure. People sometimes faint after medical procedures, including vaccination. Tell your provider if you feel dizzy or have vision changes or ringing in the ears. As with any medicine, there is a very remote chance of a vaccine causing a severe allergic reaction, other serious injury, or death. 5. What if there is a serious problem? An allergic reaction could occur after the vaccinated person leaves the clinic.  If you see signs of a severe allergic reaction (hives, swelling of the face and throat, difficulty breathing, a fast heartbeat, dizziness, or weakness), call 9-1-1 and get the person to the nearest hospital. 
 
For other signs that concern you, call your health care provider. Adverse reactions should be reported to the Vaccine Adverse Event Reporting System (VAERS). Your health care provider will usually file this report, or you can do it yourself. Visit the VAERS website at www.vaers. hhs.gov or call 0-873.402.4324. VAERS is only for reporting reactions, and VAERS staff do not give medical advice. 6. The National Vaccine Injury Compensation Program 
 
The [facility] Vaccine Injury Compensation Program (VICP) is a federal program that was created to compensate people who may have been injured by certain vaccines. Visit the VICP website at www.[facility].gov/vaccinecompensation or call 9-494.127.2151 to learn about the program and about filing a claim. There is a time limit to file a claim for compensation. 7. How can I learn more?  Ask your health care provider.  Call your local or state health department.  Contact the Centers for Disease Control and Prevention (CDC): 
- Call 0-119.452.7029 (1-802-YNJ-INFO) or 
- Visit CDCs influenza website at www.cdc.gov/flu Vaccine Information Statement (Interim) Inactivated Influenza Vaccine 8/15/2019 
[address] TEN Salgado [id number] Department of [facility] and [employer] Centers for Disease Control and Prevention Office Use Only

## 2020-09-29 NOTE — PROGRESS NOTES
Assessment and Plan    1. Needs flu shot  given  - FLU (FLUAD QUAD INFLUENZA VACCINE,QUAD,ADJUVANTED)    2. Atrial fibrillation, unspecified type ([address]  See if Eliquis is cheaper, if not warfarin  - apixaban (ELIQUIS) 5 mg tablet; Take 1 Tab by mouth two (2) times a day. Dispense: 180 Tab; Refill: 1    3. Essential hypertension  At goal    4. Acute hip pain, right  See Dr. Justen Overton as scheduled. Trial off of nightshade plants. FU 3 months  Refill tramadol until sees Dr. Justen Overton. - traMADoL (ULTRAM) 50 mg tablet; Take 1 Tab by mouth every six (6) hours as needed for Pain for up to 3 days. Max Daily Amount: 200 mg. Dispense: 30 Tab; Refill: 0      Follow-up and Dispositions    · Return in about 3 months (around 12/29/2020) for Diabetes follow up, Medication follow up. Diagnosis and plan discussed with patient who verbillized understanding. History of present illness: Bree Jones is a 72 y.o. female presenting for Hypertension and Joint Pain (This is a follow-up visit )    Med issues  Xarelto no longer covered by insurance and will be 140 per month if she stays on it. Trulicity 5705 every 3 months. Hypertension  At goal    Joint pain   Sees Dr. Justen Overton 10/9/20  Ran out of tramadol    Review of Systems   Constitutional: Negative for chills and fever. HENT: Positive for hearing loss. Respiratory: Negative for cough and shortness of breath. Cardiovascular: Negative for chest pain and palpitations. Psychiatric/Behavioral: Negative.           Past Medical History:   Diagnosis Date    Atrial fibrillation ([address]     Chronic renal failure     Diabetes ([address]     Hearing loss     Hypertension     Migraine     Morbid obesity (HCC)     Osteoarthritis     Sleep apnea     no CPAP    Thyroid disease      Past Surgical History:   Procedure Laterality Date    CARDIAC SURG PROCEDURE UNLIST      cardioversion 11/16/2018    CARDIAC SURG PROCEDURE UNLIST      ablation, 2016    COLONOSCOPY N/A 12/4/2018 COLONOSCOPY performed by Cara Mireles MD [address], normal, repeat 5 yeasr    HX DILATION AND CURETTAGE      HX HYSTERECTOMY       Family History   Problem Relation Age of Onset    Heart Disease Mother     Hypertension Mother     Diabetes Mother      Social History     Socioeconomic History    Marital status:      Spouse name: Not on file    Number of children: Not on file    Years of education: Not on file    Highest education level: Not on file   Occupational History    Not on file   Social Needs    Financial resource strain: Not on file    Food insecurity     Worry: Not on file     Inability: Not on file    Transportation needs     Medical: Not on file     Non-medical: Not on file   Tobacco Use    Smoking status: Former Smoker     Years: 1.00     Types: Cigarettes    Smokeless tobacco: Never Used   Substance and Sexual Activity    Alcohol use: No    Drug use: No    Sexual activity: Not Currently   Lifestyle    Physical activity     Days per week: Not on file     Minutes per session: Not on file    Stress: Not on file   Relationships    Social connections     Talks on phone: Not on file     Gets together: Not on file     Attends Synagogue service: Not on file     Active member of club or organization: Not on file     Attends meetings of clubs or organizations: Not on file     Relationship status: Not on file    Intimate partner violence     Fear of current or ex partner: Not on file     Emotionally abused: Not on file     Physically abused: Not on file     Forced sexual activity: Not on file   Other Topics Concern    Not on file   Social History Narrative    Not on file         Prior to Admission medications    Medication Sig Start Date End Date Taking? Authorizing Provider   traMADoL (ULTRAM) 50 mg tablet Take 1 Tab by mouth every six (6) hours as needed for Pain for up to 3 days.  Max Daily Amount: 200 mg. 9/29/20 10/2/20 Yes Vaishali Xiao MD   apixaban (ELIQUIS) 5 mg tablet Take 1 Tab by mouth two (2) times a day. 9/29/20  Yes Marek Blue MD   rivaroxaban (Xarelto) 20 mg tab tablet Take 1 Tab by mouth daily. 8/27/20 9/29/20  Marek Blue MD   isosorbide mononitrate ER (IMDUR) 30 mg tablet TAKE 1 TABLET BY MOUTH ONCE DAILY IN THE MORNING 6/22/20   Provider, Historical   dulaglutide (Trulicity) 1.5 AJ/2.9 mL sub-q pen 0.5 mL by SubCUTAneous route every seven (7) days. Indications: type 2 diabetes mellitus 8/17/20   Marek Blue MD   furosemide (LASIX) 40 mg tablet Take 1 Tab by mouth daily. 8/17/20   Marek Blue MD   amiodarone (CORDARONE) 200 mg tablet TAKE 1 TABLET DAILY 6/22/20   Marek Blue MD   amLODIPine (NORVASC) 10 mg tablet TAKE 1 TABLET DAILY 6/22/20   Marek Blue MD   losartan (COZAAR) 100 mg tablet TAKE 1 TABLET DAILY 6/22/20   Marek Blue MD   celecoxib (CELEBREX) 100 mg capsule TAKE 1 CAPSULE TWICE A DAY 6/22/20   Marek Blue MD   levothyroxine (SYNTHROID) 137 mcg tablet TAKE 1 TABLET DAILY BEFORE BREAKFAST 6/22/20   Marek Blue MD   atorvastatin (LIPITOR) 10 mg tablet TAKE 1 TABLET DAILY 6/22/20   Marek Blue MD   acetaminophen (TYLENOL EXTRA STRENGTH PO) Take  by mouth. Provider, Historical        Allergies   Allergen Reactions    Ibuprofen Hives       Vitals:    09/29/20 0833 09/29/20 0835   BP: (!) 166/60 107/65   Pulse: 67    Resp: 20    Temp: 98.1 °F (36.7 °C)    TempSrc: Oral    SpO2: 95%    Weight: (!) 354 lb 9.6 oz (160.8 kg)    Height: 5' 8\" (1.727 m)      Body mass index is 53.92 kg/m². Objective  Physical Exam  Vitals signs and nursing note reviewed. Constitutional:       Appearance: Normal appearance. She is obese. She is not toxic-appearing. HENT:      Head: Normocephalic and atraumatic. Neck:      Musculoskeletal: No muscular tenderness. Cardiovascular:      Rate and Rhythm: Normal rate and regular rhythm.       Heart sounds: Normal heart sounds. No murmur. No gallop. Pulmonary:      Effort: Pulmonary effort is normal. No respiratory distress. Breath sounds: Normal breath sounds. No wheezing, rhonchi or rales. Musculoskeletal:      Right lower le+ Pitting Edema present. Left lower le+ Pitting Edema present. Lymphadenopathy:      Cervical: No cervical adenopathy. Neurological:      Mental Status: She is alert. Psychiatric:         Mood and Affect: Mood normal.         Behavior: Behavior normal.         Thought Content:  Thought content normal.         Judgment: Judgment normal.

## 2020-09-29 NOTE — PROGRESS NOTES
Identified pt with two pt identifiers(name and ). Reviewed record in preparation for visit and have obtained necessary documentation. Chief Complaint   Patient presents with    Hypertension    Joint Pain     This is a follow-up visit         Health Maintenance Due   Topic    Shingrix Vaccine Age 50> (2 of 2)    Bone Densitometry (Dexa) Screening     Flu Vaccine (1)       Coordination of Care Questionnaire:  :   1) Have you been to an emergency room, urgent care, or hospitalized since your last visit? If yes, where when, and reason for visit?  no       2. Have seen or consulted any other health care provider since your last visit?  If yes, where when, and reason for visit?  no

## 2020-10-14 ENCOUNTER — OFFICE VISIT (OUTPATIENT)
Dept: FAMILY MEDICINE CLINIC | Age: 65
End: 2020-10-14
Payer: MEDICARE

## 2020-10-14 VITALS
BODY MASS INDEX: 44.41 KG/M2 | HEIGHT: 68 IN | DIASTOLIC BLOOD PRESSURE: 65 MMHG | WEIGHT: 293 LBS | TEMPERATURE: 98.4 F | HEART RATE: 65 BPM | RESPIRATION RATE: 20 BRPM | SYSTOLIC BLOOD PRESSURE: 163 MMHG | OXYGEN SATURATION: 95 %

## 2020-10-14 DIAGNOSIS — R30.9 URINARY PAIN: Primary | ICD-10-CM

## 2020-10-14 LAB
BILIRUB UR QL STRIP: NEGATIVE
GLUCOSE UR-MCNC: NEGATIVE MG/DL
KETONES P FAST UR STRIP-MCNC: NEGATIVE MG/DL
PH UR STRIP: 6 [PH] (ref 4.6–8)
PROT UR QL STRIP: NEGATIVE
SP GR UR STRIP: 1.01 (ref 1–1.03)
UA UROBILINOGEN AMB POC: NORMAL (ref 0.2–1)
URINALYSIS CLARITY POC: NORMAL
URINALYSIS COLOR POC: YELLOW
URINE BLOOD POC: NEGATIVE
URINE LEUKOCYTES POC: NEGATIVE
URINE NITRITES POC: NEGATIVE

## 2020-10-14 PROCEDURE — G9717 DOC PT DX DEP/BP F/U NT REQ: HCPCS | Performed by: FAMILY MEDICINE

## 2020-10-14 PROCEDURE — G8536 NO DOC ELDER MAL SCRN: HCPCS | Performed by: FAMILY MEDICINE

## 2020-10-14 PROCEDURE — G8417 CALC BMI ABV UP PARAM F/U: HCPCS | Performed by: FAMILY MEDICINE

## 2020-10-14 PROCEDURE — 1100F PTFALLS ASSESS-DOCD GE2>/YR: CPT | Performed by: FAMILY MEDICINE

## 2020-10-14 PROCEDURE — 3017F COLORECTAL CA SCREEN DOC REV: CPT | Performed by: FAMILY MEDICINE

## 2020-10-14 PROCEDURE — 81003 URINALYSIS AUTO W/O SCOPE: CPT | Performed by: FAMILY MEDICINE

## 2020-10-14 PROCEDURE — G8753 SYS BP > OR = 140: HCPCS | Performed by: FAMILY MEDICINE

## 2020-10-14 PROCEDURE — 1090F PRES/ABSN URINE INCON ASSESS: CPT | Performed by: FAMILY MEDICINE

## 2020-10-14 PROCEDURE — 99213 OFFICE O/P EST LOW 20 MIN: CPT | Performed by: FAMILY MEDICINE

## 2020-10-14 PROCEDURE — G8400 PT W/DXA NO RESULTS DOC: HCPCS | Performed by: FAMILY MEDICINE

## 2020-10-14 PROCEDURE — G8754 DIAS BP LESS 90: HCPCS | Performed by: FAMILY MEDICINE

## 2020-10-14 PROCEDURE — 3288F FALL RISK ASSESSMENT DOCD: CPT | Performed by: FAMILY MEDICINE

## 2020-10-14 PROCEDURE — G8427 DOCREV CUR MEDS BY ELIG CLIN: HCPCS | Performed by: FAMILY MEDICINE

## 2020-10-14 PROCEDURE — G9899 SCRN MAM PERF RSLTS DOC: HCPCS | Performed by: FAMILY MEDICINE

## 2020-10-14 RX ORDER — OXYBUTYNIN CHLORIDE 5 MG/1
5 TABLET ORAL
Qty: 30 TAB | Refills: 2 | Status: SHIPPED | OUTPATIENT
Start: 2020-10-14 | End: 2020-11-23 | Stop reason: SDUPTHER

## 2020-10-14 RX ORDER — OXYBUTYNIN CHLORIDE 5 MG/1
5 TABLET ORAL
Qty: 30 TAB | Refills: 2 | Status: SHIPPED | OUTPATIENT
Start: 2020-10-14 | End: 2020-10-14 | Stop reason: SDUPTHER

## 2020-10-14 NOTE — PROGRESS NOTES
Assessment and Plan    1. Urinary pain  Normal UA but had that last time she had a UTI  Check culture  Warned of frequency after lasix  Try to maker sure she voids fully  Trial of anticholinergic for possible bladder spasm  - AMB POC URINE DIP STICK MANUAL W/O MICRO CHEMSTRIP-10  - oxybutynin (DITROPAN) 5 mg tablet; Take 1 Tab by mouth nightly. Dispense: 30 Tab; Refill: 2  - CULTURE, URINE; Future      Follow-up and Dispositions    · Return in about 1 month (around 11/14/2020) for Medication follow up. Diagnosis and plan discussed with patient who verbillized understanding. History of present illness: Deyvi Lal is a 72 y.o. female presenting for Urinary Burning    Possible UTI  Sometimes feels she needs to go and only goes a tiny amount  Other times she has little bladder control and loses control on standing. Notes odor. No fever or chills  Not a lot of urinary pain. Takes Lasix between 7 and 9 AM  Sometimes gets up 2-3 times at night  No blood in urine at all  Ongoing back pain and sciatica. Saw ORTHO dx low back arthritis      Review of Systems   Constitutional: Negative for chills and fever. HENT: Negative for congestion and sore throat. Respiratory: Negative for cough and shortness of breath. Cardiovascular: Negative for chest pain. Genitourinary: Positive for dysuria, frequency and urgency. Negative for flank pain and hematuria. Musculoskeletal: Positive for back pain. Psychiatric/Behavioral: Negative.           Past Medical History:   Diagnosis Date    Atrial fibrillation ([address]     Chronic renal failure     Diabetes ([address]     Hearing loss     Hypertension     Migraine     Morbid obesity (HCC)     Osteoarthritis     Sleep apnea     no CPAP    Thyroid disease      Past Surgical History:   Procedure Laterality Date    CARDIAC SURG PROCEDURE UNLIST      cardioversion 11/16/2018    CARDIAC SURG PROCEDURE UNLIST      ablation, 2016    COLONOSCOPY N/A 12/4/2018 COLONOSCOPY performed by Lico Cash MD at [address], normal, repeat 5 yeasr    HX DILATION AND CURETTAGE      HX HYSTERECTOMY       Family History   Problem Relation Age of Onset    Heart Disease Mother     Hypertension Mother     Diabetes Mother      Social History     Socioeconomic History    Marital status:      Spouse name: Not on file    Number of children: Not on file    Years of education: Not on file    Highest education level: Not on file   Occupational History    Not on file   Social Needs    Financial resource strain: Not on file    Food insecurity     Worry: Not on file     Inability: Not on file    Transportation needs     Medical: Not on file     Non-medical: Not on file   Tobacco Use    Smoking status: Former Smoker     Years: 1.00     Types: Cigarettes    Smokeless tobacco: Never Used   Substance and Sexual Activity    Alcohol use: No    Drug use: No    Sexual activity: Not Currently   Lifestyle    Physical activity     Days per week: Not on file     Minutes per session: Not on file    Stress: Not on file   Relationships    Social connections     Talks on phone: Not on file     Gets together: Not on file     Attends [employer] service: Not on file     Active member of club or organization: Not on file     Attends meetings of clubs or organizations: Not on file     Relationship status: Not on file    Intimate partner violence     Fear of current or ex partner: Not on file     Emotionally abused: Not on file     Physically abused: Not on file     Forced sexual activity: Not on file   Other Topics Concern    Not on file   Social History Narrative    Not on file         Prior to Admission medications    Medication Sig Start Date End Date Taking? Authorizing Provider   oxybutynin (DITROPAN) 5 mg tablet Take 1 Tab by mouth nightly.  10/14/20  Yes Lorin Whalen MD   Xarelto 20 mg tab tablet TAKE 1 TABLET DAILY 10/12/20   Lorin Whalen MD   isosorbide mononitrate ER (IMDUR) 30 mg tablet TAKE 1 TABLET BY MOUTH ONCE DAILY IN THE MORNING 6/22/20   Provider, Historical   dulaglutide (Trulicity) 1.5 ED/1.4 mL sub-q pen 0.5 mL by SubCUTAneous route every seven (7) days. Indications: type 2 diabetes mellitus 8/17/20   Lora Miranda MD   furosemide (LASIX) 40 mg tablet Take 1 Tab by mouth daily. 8/17/20   Lora Miranda MD   amiodarone (CORDARONE) 200 mg tablet TAKE 1 TABLET DAILY 6/22/20   Lora Miranda MD   amLODIPine (NORVASC) 10 mg tablet TAKE 1 TABLET DAILY 6/22/20   Lora Miranda MD   losartan (COZAAR) 100 mg tablet TAKE 1 TABLET DAILY 6/22/20   Lora Miranda MD   celecoxib (CELEBREX) 100 mg capsule TAKE 1 CAPSULE TWICE A DAY 6/22/20   Lora Miranda MD   levothyroxine (SYNTHROID) 137 mcg tablet TAKE 1 TABLET DAILY BEFORE BREAKFAST 6/22/20   Lora Miranda MD   atorvastatin (LIPITOR) 10 mg tablet TAKE 1 TABLET DAILY 6/22/20   Lora Miranda MD   acetaminophen (TYLENOL EXTRA STRENGTH PO) Take  by mouth. Provider, Historical        Allergies   Allergen Reactions    Ibuprofen Hives       Vitals:    10/14/20 1623 10/14/20 1624   BP: (!) 179/68 (!) 163/65   Pulse:  65   Resp:  20   Temp:  98.4 °F (36.9 °C)   TempSrc:  Oral   SpO2:  95%   Weight:  (!) 353 lb 9.6 oz (160.4 kg)   Height:  5' 8\" (1.727 m)     Body mass index is 53.76 kg/m². Objective  Physical Exam  Vitals signs and nursing note reviewed. Constitutional:       Appearance: Normal appearance. She is not toxic-appearing. HENT:      Head: Normocephalic and atraumatic. Neck:      Musculoskeletal: No muscular tenderness. Cardiovascular:      Rate and Rhythm: Normal rate and regular rhythm. Heart sounds: Normal heart sounds. No murmur. No gallop. Pulmonary:      Effort: Pulmonary effort is normal. No respiratory distress. Breath sounds: Normal breath sounds. No wheezing, rhonchi or rales. Abdominal:      General: Abdomen is flat. Palpations: Abdomen is soft. Tenderness: There is no abdominal tenderness. There is no right CVA tenderness or left CVA tenderness. Lymphadenopathy:      Cervical: No cervical adenopathy. Neurological:      Mental Status: She is alert. Psychiatric:         Mood and Affect: Mood normal.         Behavior: Behavior normal.         Thought Content:  Thought content normal.         Judgment: Judgment normal.

## 2020-10-14 NOTE — PROGRESS NOTES
Identified pt with two pt identifiers(name and ). Reviewed record in preparation for visit and have obtained necessary documentation. Chief Complaint   Patient presents with    Urinary Burning        Health Maintenance Due   Topic    Shingrix Vaccine Age 50> (2 of 2)    Bone Densitometry (Dexa) Screening        Coordination of Care Questionnaire:  :   1) Have you been to an emergency room, urgent care, or hospitalized since your last visit? If yes, where when, and reason for visit? No     2. Have seen or consulted any other health care provider since your last visit? If yes, where when, and reason for visit?   No

## 2020-10-17 LAB — BACTERIA UR CULT: NORMAL

## 2020-10-19 NOTE — PROGRESS NOTES
The urine culture test showed no signs of infection. Let me know how the bladder spasm medication works.   [facility]

## 2020-11-05 DIAGNOSIS — M19.90 OSTEOARTHRITIS, UNSPECIFIED OSTEOARTHRITIS TYPE, UNSPECIFIED SITE: ICD-10-CM

## 2020-11-05 DIAGNOSIS — E03.9 ACQUIRED HYPOTHYROIDISM: ICD-10-CM

## 2020-11-05 DIAGNOSIS — I10 ESSENTIAL HYPERTENSION: ICD-10-CM

## 2020-11-05 DIAGNOSIS — I48.20 CHRONIC ATRIAL FIBRILLATION (HCC): ICD-10-CM

## 2020-11-05 DIAGNOSIS — E78.00 HYPERCHOLESTEREMIA: ICD-10-CM

## 2020-11-05 RX ORDER — AMLODIPINE BESYLATE 10 MG/1
TABLET ORAL
Qty: 90 TAB | Refills: 1 | Status: SHIPPED | OUTPATIENT
Start: 2020-11-05 | End: 2021-01-12 | Stop reason: SDUPTHER

## 2020-11-05 RX ORDER — LEVOTHYROXINE SODIUM 137 UG/1
TABLET ORAL
Qty: 90 TAB | Refills: 1 | Status: SHIPPED | OUTPATIENT
Start: 2020-11-05 | End: 2021-01-12 | Stop reason: SDUPTHER

## 2020-11-05 RX ORDER — AMIODARONE HYDROCHLORIDE 200 MG/1
TABLET ORAL
Qty: 90 TAB | Refills: 1 | Status: SHIPPED | OUTPATIENT
Start: 2020-11-05 | End: 2021-01-12 | Stop reason: SDUPTHER

## 2020-11-05 RX ORDER — ATORVASTATIN CALCIUM 10 MG/1
TABLET, FILM COATED ORAL
Qty: 90 TAB | Refills: 1 | Status: SHIPPED | OUTPATIENT
Start: 2020-11-05 | End: 2021-01-12 | Stop reason: SDUPTHER

## 2020-11-05 RX ORDER — CELECOXIB 100 MG/1
CAPSULE ORAL
Qty: 180 CAP | Refills: 1 | Status: SHIPPED | OUTPATIENT
Start: 2020-11-05 | End: 2021-01-12 | Stop reason: SDUPTHER

## 2020-11-05 RX ORDER — FUROSEMIDE 40 MG/1
40 TABLET ORAL DAILY
Qty: 90 TAB | Refills: 1 | Status: SHIPPED | OUTPATIENT
Start: 2020-11-05 | End: 2021-01-12 | Stop reason: SDUPTHER

## 2020-11-05 RX ORDER — LOSARTAN POTASSIUM 100 MG/1
TABLET ORAL
Qty: 90 TAB | Refills: 1 | Status: SHIPPED | OUTPATIENT
Start: 2020-11-05 | End: 2021-01-12 | Stop reason: SDUPTHER

## 2020-11-23 ENCOUNTER — OFFICE VISIT (OUTPATIENT)
Dept: FAMILY MEDICINE CLINIC | Age: 65
End: 2020-11-23
Payer: MEDICARE

## 2020-11-23 VITALS
RESPIRATION RATE: 16 BRPM | WEIGHT: 293 LBS | TEMPERATURE: 97.8 F | BODY MASS INDEX: 44.41 KG/M2 | HEIGHT: 68 IN | HEART RATE: 74 BPM | SYSTOLIC BLOOD PRESSURE: 158 MMHG | DIASTOLIC BLOOD PRESSURE: 64 MMHG | OXYGEN SATURATION: 96 %

## 2020-11-23 DIAGNOSIS — R30.9 URINARY PAIN: Primary | ICD-10-CM

## 2020-11-23 DIAGNOSIS — L72.3 SEBACEOUS CYST: ICD-10-CM

## 2020-11-23 DIAGNOSIS — E11.21 TYPE 2 DIABETES WITH NEPHROPATHY (HCC): ICD-10-CM

## 2020-11-23 PROCEDURE — G8754 DIAS BP LESS 90: HCPCS | Performed by: FAMILY MEDICINE

## 2020-11-23 PROCEDURE — G8427 DOCREV CUR MEDS BY ELIG CLIN: HCPCS | Performed by: FAMILY MEDICINE

## 2020-11-23 PROCEDURE — G9717 DOC PT DX DEP/BP F/U NT REQ: HCPCS | Performed by: FAMILY MEDICINE

## 2020-11-23 PROCEDURE — 1101F PT FALLS ASSESS-DOCD LE1/YR: CPT | Performed by: FAMILY MEDICINE

## 2020-11-23 PROCEDURE — 99214 OFFICE O/P EST MOD 30 MIN: CPT | Performed by: FAMILY MEDICINE

## 2020-11-23 PROCEDURE — G8400 PT W/DXA NO RESULTS DOC: HCPCS | Performed by: FAMILY MEDICINE

## 2020-11-23 PROCEDURE — 1090F PRES/ABSN URINE INCON ASSESS: CPT | Performed by: FAMILY MEDICINE

## 2020-11-23 PROCEDURE — G9899 SCRN MAM PERF RSLTS DOC: HCPCS | Performed by: FAMILY MEDICINE

## 2020-11-23 PROCEDURE — 3044F HG A1C LEVEL LT 7.0%: CPT | Performed by: FAMILY MEDICINE

## 2020-11-23 PROCEDURE — G8417 CALC BMI ABV UP PARAM F/U: HCPCS | Performed by: FAMILY MEDICINE

## 2020-11-23 PROCEDURE — 3017F COLORECTAL CA SCREEN DOC REV: CPT | Performed by: FAMILY MEDICINE

## 2020-11-23 PROCEDURE — G8753 SYS BP > OR = 140: HCPCS | Performed by: FAMILY MEDICINE

## 2020-11-23 PROCEDURE — G8536 NO DOC ELDER MAL SCRN: HCPCS | Performed by: FAMILY MEDICINE

## 2020-11-23 PROCEDURE — 2022F DILAT RTA XM EVC RTNOPTHY: CPT | Performed by: FAMILY MEDICINE

## 2020-11-23 RX ORDER — OXYBUTYNIN CHLORIDE 5 MG/1
5 TABLET ORAL
Qty: 90 TAB | Refills: 1 | Status: SHIPPED | OUTPATIENT
Start: 2020-11-23 | End: 2020-12-01 | Stop reason: SDUPTHER

## 2020-11-23 NOTE — PROGRESS NOTES
Assessment and Plan    1. Urinary pain  Improved with oxybutynin  This is a pretty good improvement and don't think we will do much better with DM and Lasix potentially making sx worse. She understands she will urinate more with poor DM control so stick to diet  - oxybutynin (DITROPAN) 5 mg tablet; Take 1 Tab by mouth nightly. Dispense: 90 Tab; Refill: 1    2. Type 2 diabetes with nephropathy ([address]  Doing well with only Trulicity, to continues    3. Sebaceous cyst  Back recurrent sebaceous cyst    FU with Cardiology as scheduled in January regarding dsypnea, sooner if develops chest pain  See OPHTH over likely floater    Follow-up and Dispositions    · Return in about 3 months (around 2/23/2021) for Diabetes follow up, Medication follow up. Diagnosis and plan discussed with patient who verbillized understanding. History of present illness: Cristiane Mariscal is a 72 y.o. female presenting for Follow-up and Medication Evaluation    Bladder spasm  On oxybutynin  Now up only 1-2 times a night   Was 3-4 times  Still urinates a lot in the morning after lasix but that tapers off at lunch time. Diabetes  Good most days   fasting    BP up but had to walk up steps into office  Breathing remains an issues  Living room to bathroom 25 steps makes her dyspneic  Has no chest pain like she had with a fib but feels heart is \"big\" in her chest.    Eye sx  Like a floater OD  Like a jelly fish        Review of Systems   Constitutional: Negative for chills and fever. HENT: Negative for congestion and sore throat. Eyes: Positive for blurred vision. Negative for double vision, photophobia and pain. Respiratory: Positive for shortness of breath. Negative for cough. Cardiovascular: Positive for leg swelling. Negative for chest pain and palpitations. Genitourinary: Positive for frequency. Negative for dysuria, hematuria and urgency. Psychiatric/Behavioral: Negative.           Past Medical History:   Diagnosis Date    Atrial fibrillation (HCC)     Chronic renal failure     [facility]     Hearing loss     Hypertension     Migraine     Morbid obesity ([facility]     Osteoarthritis     Sleep apnea     no CPAP    Thyroid disease      Past Surgical History:   Procedure Laterality Date    CARDIAC SURG PROCEDURE UNLIST      cardioversion 11/16/2018    CARDIAC SURG PROCEDURE UNLIST      ablation, 2016    COLONOSCOPY N/A 12/4/2018    COLONOSCOPY performed by Lico Cash MD [address], normal, repeat 5 yeasr    HX DILATION AND CURETTAGE      HX HYSTERECTOMY       Family History   Problem Relation Age of Onset    Heart Disease Mother     Hypertension Mother     Diabetes Mother      Social History     Socioeconomic History    Marital status:      Spouse name: Not on file    Number of children: Not on file    Years of education: Not on file    Highest education level: Not on file   Occupational History    Not on file   Social Needs    Financial resource strain: Not on file    Food insecurity     Worry: Not on file     Inability: Not on file   [employer] needs     Medical: Not on file     Non-medical: Not on file   Tobacco Use    Smoking status: Former Smoker     Years: 1.00     Types: Cigarettes    Smokeless tobacco: Never Used   Substance and Sexual Activity    Alcohol use: No    Drug use: No    Sexual activity: Not Currently   Lifestyle    Physical activity     Days per week: Not on file     Minutes per session: Not on file    Stress: Not on file   Relationships    Social connections     Talks on phone: Not on file     Gets together: Not on file     Attends [employer] service: Not on file     Active member of club or organization: Not on file     Attends meetings of clubs or organizations: Not on file     Relationship status: Not on file    Intimate partner violence     Fear of current or ex partner: Not on file     Emotionally abused: Not on file     Physically abused: Not on file     Forced sexual activity: Not on file   Other Topics Concern    Not on file   Social History Narrative    Not on file         Prior to Admission medications    Medication Sig Start Date End Date Taking? Authorizing Provider   oxybutynin (DITROPAN) 5 mg tablet Take 1 Tab by mouth nightly. 11/23/20  Yes Tiffany Bowers MD   losartan (COZAAR) 100 mg tablet TAKE 1 TABLET DAILY 11/5/20  Yes Tiffany Bowers MD   celecoxib (CELEBREX) 100 mg capsule TAKE 1 CAPSULE TWICE A DAY 11/5/20  Yes Tiffany Bowers MD   amLODIPine (NORVASC) 10 mg tablet TAKE 1 TABLET DAILY 11/5/20  Yes Tiffany Bowers MD   amiodarone (CORDARONE) 200 mg tablet TAKE 1 TABLET DAILY 11/5/20  Yes Tiffany Bowers MD   furosemide (LASIX) 40 mg tablet Take 1 Tab by mouth daily. 11/5/20  Yes Tiffany Bowers MD   atorvastatin (LIPITOR) 10 mg tablet TAKE 1 TABLET DAILY 11/5/20  Yes Tiffany Bowers MD   levothyroxine (SYNTHROID) 137 mcg tablet TAKE 1 TABLET DAILY BEFORE BREAKFAST 11/5/20  Yes Tiffany Bowers MD   Xarelto 20 mg tab tablet TAKE 1 TABLET DAILY 10/12/20  Yes Tiffany Bowers MD   isosorbide mononitrate ER (IMDUR) 30 mg tablet TAKE 1 TABLET BY MOUTH ONCE DAILY IN THE MORNING 6/22/20  Yes Provider, Historical   dulaglutide (Trulicity) 1.5 GG/9.1 mL sub-q pen 0.5 mL by SubCUTAneous route every seven (7) days. Indications: type 2 diabetes mellitus 8/17/20  Yes Tiffany Bowers MD   acetaminophen (TYLENOL EXTRA STRENGTH PO) Take  by mouth. Yes Provider, Historical   oxybutynin (DITROPAN) 5 mg tablet Take 1 Tab by mouth nightly. 10/14/20 11/23/20  Tiffany Bowers MD        Allergies   Allergen Reactions    Ibuprofen Hives       Vitals:    11/23/20 1014   BP: (!) 161/65   Pulse: 74   Resp: 16   Temp: 97.8 °F (36.6 °C)   TempSrc: Oral   SpO2: 96%   Weight: (!) 356 lb (161.5 kg)   Height: 5' 8\" (1.727 m)     Body mass index is 54.13 kg/m².       Objective  Physical Exam  Vitals signs and nursing note reviewed. Constitutional:       Appearance: Normal appearance. She is not toxic-appearing. HENT:      Head: Normocephalic and atraumatic. Neck:      Musculoskeletal: No muscular tenderness. Cardiovascular:      Rate and Rhythm: Normal rate and regular rhythm. Heart sounds: Normal heart sounds. No murmur. No gallop. Pulmonary:      Effort: Pulmonary effort is normal. No respiratory distress. Breath sounds: Normal breath sounds. No wheezing, rhonchi or rales. Lymphadenopathy:      Cervical: No cervical adenopathy. Neurological:      Mental Status: She is alert. Psychiatric:         Mood and Affect: Mood normal.         Behavior: Behavior normal.         Thought Content:  Thought content normal.         Judgment: Judgment normal.

## 2020-11-23 NOTE — PROGRESS NOTES
1. Have you been to the ER, urgent care clinic since your last visit? Hospitalized since your last visit? No    2. Have you seen or consulted any other health care providers outside of the [address] [place] [address] since your last visit? Include any pap smears or colon screening.  No     Pharmacy verified  [facility], [address] [place] [address]    Chief Complaint   Patient presents with    Follow-up    Medication Evaluation     Visit Vitals  BP (!) 161/65 (BP 1 Location: Right arm, BP Patient Position: Sitting)   Pulse 74   Temp 97.8 °F (36.6 °C) (Oral)   Resp 16   Ht 5' 8\" (1.727 m)   Wt (!) 356 lb (161.5 kg)   SpO2 96%   BMI 54.13 kg/m²     3 most recent PHQ Screens 11/23/2020   Little interest or pleasure in doing things Several days   Feeling down, depressed, irritable, or hopeless Several days   Total Score PHQ 2 2     Health Maintenance Due   Topic Date Due    DTaP/Tdap/Td series (1 - Tdap) 01/04/1976    Shingrix Vaccine Age 50> (2 of 2) 09/24/2019    Bone Densitometry (Dexa) Screening  01/04/2020

## 2020-12-01 ENCOUNTER — PATIENT MESSAGE (OUTPATIENT)
Dept: FAMILY MEDICINE CLINIC | Age: 65
End: 2020-12-01

## 2020-12-01 DIAGNOSIS — R30.9 URINARY PAIN: ICD-10-CM

## 2020-12-01 RX ORDER — OXYBUTYNIN CHLORIDE 5 MG/1
5 TABLET ORAL
Qty: 90 TAB | Refills: 1 | Status: SHIPPED | OUTPATIENT
Start: 2020-12-01 | End: 2021-01-12 | Stop reason: SDUPTHER

## 2020-12-01 NOTE — TELEPHONE ENCOUNTER
12/01/20  5:03 PM    1. Urinary pain  [facility]  - oxybutynin (DITROPAN) 5 mg tablet; Take 1 Tab by mouth nightly. Dispense: 90 Tab;  Refill: 1      Farhan Christianson MD

## 2020-12-20 NOTE — TELEPHONE ENCOUNTER
08/21/20  10:15 AM    1. Urinary tract infection without hematuria, site unspecified  + culture  - cephALEXin (KEFLEX) 500 mg capsule; Take 1 Cap by mouth four (4) times daily for 10 days. Dispense: 40 Cap;  Refill: 0      Sinan Godinez MD 0

## 2021-01-06 ENCOUNTER — PATIENT MESSAGE (OUTPATIENT)
Dept: FAMILY MEDICINE CLINIC | Age: 66
End: 2021-01-06

## 2021-01-11 ENCOUNTER — PATIENT MESSAGE (OUTPATIENT)
Dept: FAMILY MEDICINE CLINIC | Age: 66
End: 2021-01-11

## 2021-01-11 DIAGNOSIS — I10 ESSENTIAL HYPERTENSION: ICD-10-CM

## 2021-01-11 DIAGNOSIS — M19.90 OSTEOARTHRITIS, UNSPECIFIED OSTEOARTHRITIS TYPE, UNSPECIFIED SITE: ICD-10-CM

## 2021-01-11 DIAGNOSIS — I48.20 CHRONIC ATRIAL FIBRILLATION (HCC): ICD-10-CM

## 2021-01-11 DIAGNOSIS — R30.9 URINARY PAIN: ICD-10-CM

## 2021-01-11 DIAGNOSIS — E78.00 HYPERCHOLESTEREMIA: ICD-10-CM

## 2021-01-11 DIAGNOSIS — E11.8 TYPE 2 DIABETES MELLITUS WITH COMPLICATION, WITHOUT LONG-TERM CURRENT USE OF INSULIN (HCC): ICD-10-CM

## 2021-01-11 DIAGNOSIS — E03.9 ACQUIRED HYPOTHYROIDISM: ICD-10-CM

## 2021-01-12 RX ORDER — ISOSORBIDE MONONITRATE 30 MG/1
TABLET, EXTENDED RELEASE ORAL
Qty: 90 TAB | Refills: 1 | Status: SHIPPED | OUTPATIENT
Start: 2021-01-12 | End: 2021-04-13 | Stop reason: SDUPTHER

## 2021-01-12 RX ORDER — DULAGLUTIDE 1.5 MG/.5ML
1.5 INJECTION, SOLUTION SUBCUTANEOUS
Qty: 13 SYRINGE | Refills: 1 | Status: SHIPPED | OUTPATIENT
Start: 2021-01-12 | End: 2021-04-13

## 2021-01-12 RX ORDER — CELECOXIB 100 MG/1
100 CAPSULE ORAL 2 TIMES DAILY
Qty: 180 CAP | Refills: 1 | Status: SHIPPED | OUTPATIENT
Start: 2021-01-12 | End: 2021-08-18

## 2021-01-12 RX ORDER — AMIODARONE HYDROCHLORIDE 200 MG/1
TABLET ORAL
Qty: 90 TAB | Refills: 1 | Status: SHIPPED | OUTPATIENT
Start: 2021-01-12 | End: 2021-04-13 | Stop reason: SDUPTHER

## 2021-01-12 RX ORDER — AMLODIPINE BESYLATE 10 MG/1
TABLET ORAL
Qty: 90 TAB | Refills: 1 | Status: SHIPPED | OUTPATIENT
Start: 2021-01-12 | End: 2021-04-13 | Stop reason: SDUPTHER

## 2021-01-12 RX ORDER — OXYBUTYNIN CHLORIDE 5 MG/1
5 TABLET ORAL
Qty: 90 TAB | Refills: 1 | Status: SHIPPED | OUTPATIENT
Start: 2021-01-12 | End: 2021-04-13 | Stop reason: SDUPTHER

## 2021-01-12 RX ORDER — LOSARTAN POTASSIUM 100 MG/1
TABLET ORAL
Qty: 90 TAB | Refills: 1 | Status: SHIPPED | OUTPATIENT
Start: 2021-01-12 | End: 2021-04-13 | Stop reason: SDUPTHER

## 2021-01-12 RX ORDER — ATORVASTATIN CALCIUM 10 MG/1
TABLET, FILM COATED ORAL
Qty: 90 TAB | Refills: 1 | Status: SHIPPED | OUTPATIENT
Start: 2021-01-12 | End: 2021-04-13 | Stop reason: SDUPTHER

## 2021-01-12 RX ORDER — LEVOTHYROXINE SODIUM 137 UG/1
TABLET ORAL
Qty: 90 TAB | Refills: 1 | Status: SHIPPED | OUTPATIENT
Start: 2021-01-12 | End: 2021-04-13 | Stop reason: SDUPTHER

## 2021-01-12 RX ORDER — FUROSEMIDE 40 MG/1
40 TABLET ORAL DAILY
Qty: 90 TAB | Refills: 1 | Status: SHIPPED | OUTPATIENT
Start: 2021-01-12 | End: 2021-04-13 | Stop reason: SDUPTHER

## 2021-01-12 NOTE — TELEPHONE ENCOUNTER
01/12/21  2:03 PM  Refilled all meds with new pharmacy plan    1. Chronic atrial fibrillation (HCC)    - amiodarone (CORDARONE) 200 mg tablet; TAKE 1 TABLET DAILY  Dispense: 90 Tab; Refill: 1    2. Essential hypertension    - amLODIPine (NORVASC) 10 mg tablet; TAKE 1 TABLET DAILY  Dispense: 90 Tab; Refill: 1  - furosemide (LASIX) 40 mg tablet; Take 1 Tab by mouth daily. Dispense: 90 Tab; Refill: 1  - losartan (COZAAR) 100 mg tablet; TAKE 1 TABLET DAILY  Dispense: 90 Tab; Refill: 1    3. Hypercholesteremia    - atorvastatin (LIPITOR) 10 mg tablet; TAKE 1 TABLET DAILY  Dispense: 90 Tab; Refill: 1    4. Osteoarthritis, unspecified osteoarthritis type, unspecified site    - celecoxib (CELEBREX) 100 mg capsule; Take 1 Cap by mouth two (2) times a day. Dispense: 180 Cap; Refill: 1    5. Type 2 diabetes mellitus with complication, without long-term current use of insulin (HCC)    - dulaglutide (Trulicity) 1.5 HM/5.5 mL sub-q pen; 0.5 mL by SubCUTAneous route every seven (7) days. Indications: type 2 diabetes mellitus  Dispense: 13 Syringe; Refill: 1    6. Acquired hypothyroidism    - levothyroxine (SYNTHROID) 137 mcg tablet; TAKE 1 TABLET DAILY BEFORE BREAKFAST  Dispense: 90 Tab; Refill: 1    7. Urinary pain    - oxybutynin (DITROPAN) 5 mg tablet; Take 1 Tab by mouth nightly. Dispense: 90 Tab;  Refill: 1      Miko Sauceda MD

## 2021-01-19 ENCOUNTER — TELEPHONE (OUTPATIENT)
Dept: FAMILY MEDICINE CLINIC | Age: 66
End: 2021-01-19

## 2021-01-19 NOTE — TELEPHONE ENCOUNTER
Shanda Morales from [facility] pt has a drug interaction with     Celebrex 100 mg and Xarelto 20 mg      Contact #: [phone]  Ref #: [id number]      Please Advise

## 2021-01-21 NOTE — TELEPHONE ENCOUNTER
----- Message from Eliza Thacker sent at 1/21/2021  9:11 AM EST -----  Regarding: Dr. Sarah Anthony: [phone]  General Message/Vendor Calls    Caller's first and last name: Dianelys with [employer] [facility]      Reason for call: Drug interaction verification for \"Xarelto\" & \"Celebrex\"      Callback required yes/no and why: Yes, verification needed. Best contact number(s): [phone]        Details to clarify the request: Please verify if they need to be on both of these medication. They have made several request since 1/13/21 for this drug interaction verification without response. If they do not hear from the doctor by tomorrow, the prescription will be returned and the patient will not receive either. Reference number when returning the call is: [id number].       Eliza Thacker

## 2021-04-13 ENCOUNTER — OFFICE VISIT (OUTPATIENT)
Dept: FAMILY MEDICINE CLINIC | Age: 66
End: 2021-04-13
Payer: MEDICARE

## 2021-04-13 VITALS
BODY MASS INDEX: 44.41 KG/M2 | RESPIRATION RATE: 22 BRPM | OXYGEN SATURATION: 97 % | WEIGHT: 293 LBS | TEMPERATURE: 98.2 F | SYSTOLIC BLOOD PRESSURE: 147 MMHG | HEIGHT: 68 IN | HEART RATE: 48 BPM | DIASTOLIC BLOOD PRESSURE: 54 MMHG

## 2021-04-13 DIAGNOSIS — I48.20 CHRONIC ATRIAL FIBRILLATION (HCC): ICD-10-CM

## 2021-04-13 DIAGNOSIS — E66.9 OBESITY WITH SERIOUS COMORBIDITY, UNSPECIFIED CLASSIFICATION, UNSPECIFIED OBESITY TYPE: ICD-10-CM

## 2021-04-13 DIAGNOSIS — I10 ESSENTIAL HYPERTENSION: ICD-10-CM

## 2021-04-13 DIAGNOSIS — E11.21 TYPE 2 DIABETES WITH NEPHROPATHY (HCC): ICD-10-CM

## 2021-04-13 DIAGNOSIS — E78.00 HYPERCHOLESTEREMIA: ICD-10-CM

## 2021-04-13 DIAGNOSIS — M19.90 OSTEOARTHRITIS, UNSPECIFIED OSTEOARTHRITIS TYPE, UNSPECIFIED SITE: Primary | ICD-10-CM

## 2021-04-13 DIAGNOSIS — E03.9 ACQUIRED HYPOTHYROIDISM: ICD-10-CM

## 2021-04-13 DIAGNOSIS — R30.9 URINARY PAIN: ICD-10-CM

## 2021-04-13 LAB
ANION GAP SERPL CALC-SCNC: 8 MMOL/L (ref 5–15)
BASOPHILS # BLD: 0.1 K/UL (ref 0–0.1)
BASOPHILS NFR BLD: 1 % (ref 0–1)
BUN SERPL-MCNC: 22 MG/DL (ref 6–20)
BUN/CREAT SERPL: 26 (ref 12–20)
CALCIUM SERPL-MCNC: 9.5 MG/DL (ref 8.5–10.1)
CHLORIDE SERPL-SCNC: 105 MMOL/L (ref 97–108)
CO2 SERPL-SCNC: 28 MMOL/L (ref 21–32)
CREAT SERPL-MCNC: 0.85 MG/DL (ref 0.55–1.02)
CREAT UR-MCNC: 149 MG/DL
DIFFERENTIAL METHOD BLD: ABNORMAL
EOSINOPHIL # BLD: 0.3 K/UL (ref 0–0.4)
EOSINOPHIL NFR BLD: 4 % (ref 0–7)
ERYTHROCYTE [DISTWIDTH] IN BLOOD BY AUTOMATED COUNT: 15.1 % (ref 11.5–14.5)
EST. AVERAGE GLUCOSE BLD GHB EST-MCNC: 131 MG/DL
GLUCOSE SERPL-MCNC: 114 MG/DL (ref 65–100)
HBA1C MFR BLD: 6.2 % (ref 4–5.6)
HCT VFR BLD AUTO: 42.1 % (ref 35–47)
HGB BLD-MCNC: 12.9 G/DL (ref 11.5–16)
IMM GRANULOCYTES # BLD AUTO: 0 K/UL (ref 0–0.04)
IMM GRANULOCYTES NFR BLD AUTO: 0 % (ref 0–0.5)
LYMPHOCYTES # BLD: 1.6 K/UL (ref 0.8–3.5)
LYMPHOCYTES NFR BLD: 23 % (ref 12–49)
MCH RBC QN AUTO: 26.3 PG (ref 26–34)
MCHC RBC AUTO-ENTMCNC: 30.6 G/DL (ref 30–36.5)
MCV RBC AUTO: 85.9 FL (ref 80–99)
MICROALBUMIN UR-MCNC: 0.77 MG/DL
MICROALBUMIN/CREAT UR-RTO: 5 MG/G (ref 0–30)
MONOCYTES # BLD: 0.5 K/UL (ref 0–1)
MONOCYTES NFR BLD: 7 % (ref 5–13)
NEUTS SEG # BLD: 4.4 K/UL (ref 1.8–8)
NEUTS SEG NFR BLD: 65 % (ref 32–75)
NRBC # BLD: 0 K/UL (ref 0–0.01)
NRBC BLD-RTO: 0 PER 100 WBC
PLATELET # BLD AUTO: 253 K/UL (ref 150–400)
PMV BLD AUTO: 11.4 FL (ref 8.9–12.9)
POTASSIUM SERPL-SCNC: 3.7 MMOL/L (ref 3.5–5.1)
RBC # BLD AUTO: 4.9 M/UL (ref 3.8–5.2)
SODIUM SERPL-SCNC: 141 MMOL/L (ref 136–145)
TSH SERPL DL<=0.05 MIU/L-ACNC: 0.99 UIU/ML (ref 0.36–3.74)
WBC # BLD AUTO: 6.9 K/UL (ref 3.6–11)

## 2021-04-13 PROCEDURE — 99495 TRANSJ CARE MGMT MOD F2F 14D: CPT | Performed by: FAMILY MEDICINE

## 2021-04-13 PROCEDURE — G8427 DOCREV CUR MEDS BY ELIG CLIN: HCPCS | Performed by: FAMILY MEDICINE

## 2021-04-13 RX ORDER — AMIODARONE HYDROCHLORIDE 200 MG/1
TABLET ORAL
Qty: 90 TAB | Refills: 1 | Status: SHIPPED | OUTPATIENT
Start: 2021-04-13 | End: 2021-06-23

## 2021-04-13 RX ORDER — AMLODIPINE BESYLATE 10 MG/1
TABLET ORAL
Qty: 90 TAB | Refills: 1 | Status: SHIPPED | OUTPATIENT
Start: 2021-04-13 | End: 2021-06-23

## 2021-04-13 RX ORDER — ISOSORBIDE MONONITRATE 30 MG/1
TABLET, EXTENDED RELEASE ORAL
Qty: 90 TAB | Refills: 1 | Status: SHIPPED | OUTPATIENT
Start: 2021-04-13 | End: 2021-06-23

## 2021-04-13 RX ORDER — TOPIRAMATE 25 MG/1
TABLET ORAL
Qty: 60 TAB | Refills: 2 | Status: SHIPPED | OUTPATIENT
Start: 2021-04-13 | End: 2021-08-18

## 2021-04-13 RX ORDER — ATORVASTATIN CALCIUM 10 MG/1
TABLET, FILM COATED ORAL
Qty: 90 TAB | Refills: 1 | Status: SHIPPED | OUTPATIENT
Start: 2021-04-13 | End: 2021-06-23

## 2021-04-13 RX ORDER — OXYBUTYNIN CHLORIDE 5 MG/1
5 TABLET ORAL
Qty: 90 TAB | Refills: 1 | Status: SHIPPED | OUTPATIENT
Start: 2021-04-13 | End: 2021-06-23

## 2021-04-13 RX ORDER — LOSARTAN POTASSIUM 100 MG/1
TABLET ORAL
Qty: 90 TAB | Refills: 1 | Status: SHIPPED | OUTPATIENT
Start: 2021-04-13 | End: 2021-06-23

## 2021-04-13 RX ORDER — LEVOTHYROXINE SODIUM 137 UG/1
TABLET ORAL
Qty: 90 TAB | Refills: 1 | Status: SHIPPED | OUTPATIENT
Start: 2021-04-13 | End: 2021-06-23

## 2021-04-13 RX ORDER — FUROSEMIDE 40 MG/1
40 TABLET ORAL DAILY
Qty: 90 TAB | Refills: 1 | Status: SHIPPED | OUTPATIENT
Start: 2021-04-13 | End: 2021-06-23

## 2021-04-13 NOTE — PROGRESS NOTES
Identified pt with two pt identifiers(name and ). Reviewed record in preparation for visit and have obtained necessary documentation. Chief Complaint   Patient presents with   [facility] Follow Up     ER follow-up visit  knee pain         Health Maintenance Due   Topic    DTaP/Tdap/Td series (1 - Tdap)    Shingrix Vaccine Age 49> (2 of 2)    Bone Densitometry (Dexa) Screening     COVID-19 Vaccine (2 - Pfizer 2-dose series)       Coordination of Care Questionnaire:  :   1) Have you been to an emergency room, urgent care, or hospitalized since your last visit? If yes, where when, and reason for visit? Yes [place]  ( knee pain)       2. Have seen or consulted any other health care provider since your last visit? If yes, where when, and reason for visit?   No

## 2021-04-13 NOTE — PROGRESS NOTES
Jermaine Parks  77 y.o. female  1955  [id number]  [facility] FAMILY MEDICINE  Progress Note     Encounter Date: 4/13/2021    Assessment and Plan:     Encounter Diagnoses     ICD-10-CM ICD-9-CM   1. Osteoarthritis, unspecified osteoarthritis type, unspecified site  M19.90 715.90   2. Essential hypertension  I10 401.9   3. Hypercholesteremia  E78.00 272.0   4. Acquired hypothyroidism  E03.9 244.9   5. Urinary pain  R30.9 788.1   6. Chronic atrial fibrillation (HCC)  I48.20 427.31   7. Type 2 diabetes with nephropathy (HCC)  E11.21 250.40     583.81   8. Obesity with serious comorbidity, unspecified classification, unspecified obesity type  E66.9 278.00       1. Essential hypertension  Nearly at goal, continue meds and update labs  - amLODIPine (NORVASC) 10 mg tablet; TAKE 1 TABLET DAILY  Dispense: 90 Tab; Refill: 1  - furosemide (LASIX) 40 mg tablet; Take 1 Tab by mouth daily. Dispense: 90 Tab; Refill: 1  - losartan (COZAAR) 100 mg tablet; TAKE 1 TABLET DAILY  Dispense: 90 Tab; Refill: 1  - METABOLIC PANEL, BASIC; Future  - MICROALBUMIN, UR, RAND W/ MICROALB/CREAT RATIO; Future    2. Hypercholesteremia  Continue statin  - atorvastatin (LIPITOR) 10 mg tablet; TAKE 1 TABLET DAILY  Dispense: 90 Tab; Refill: 1    3. Acquired hypothyroidism  Continue replacement  - levothyroxine (SYNTHROID) 137 mcg tablet; TAKE 1 TABLET DAILY BEFORE BREAKFAST  Dispense: 90 Tab; Refill: 1  - TSH 3RD GENERATION; Future    4. Urinary pain  Continue anticholinergic  - oxybutynin (DITROPAN) 5 mg tablet; Take 1 Tab by mouth nightly. Dispense: 90 Tab; Refill: 1    5. Chronic atrial fibrillation (HCC)  Refills meds  - isosorbide mononitrate ER (IMDUR) 30 mg tablet; TAKE 1 TABLET BY MOUTH ONCE DAILY IN THE MORNING  Dispense: 90 Tab; Refill: 1  - amiodarone (CORDARONE) 200 mg tablet; TAKE 1 TABLET DAILY  Dispense: 90 Tab; Refill: 1  - rivaroxaban (Xarelto) 20 mg tab tablet; TAKE 1 TABLET DAILY  Dispense: 90 Tab; Refill: 1    6. Osteoarthritis, unspecified osteoarthritis type, unspecified site  Do not feel she should continue celebrex on Xarelto  I cannot find anatomic landmarks adequately to attempt steroid injection  Knee is not red or warm so think this is DJD not inflammatory arthritis or gout  - REFERRAL TO ORTHOPEDICS to Dr. Noah Webster    7. Type 2 diabetes with nephropathy (HCC)  Currently on no meds, patient could not afford Trulicity. Losing weight, asking for weight loss med  - CBC WITH AUTOMATED DIFF; Future  - HEMOGLOBIN A1C WITH EAG; Future    8. Obesity with serious comorbidity, unspecified classification, unspecified obesity type  Trial of topiramate for weight loss  Likely will eventually need knee replaced. Will need weight loss for that. Diet for weight and DM again discussed. Intermittent fasting discussed. - topiramate (TOPAMAX) 25 mg tablet; One tab by mouth QHS for one week then two PO Qhs  Dispense: 60 Tab; Refill: 2      I have discussed the diagnosis with the patient and the intended plan as seen in the above orders. she has expressed understanding. The patient has received an after-visit summary and questions were answered concerning future plans. I have discussed medication side effects and warnings with the patient as well. Electronically Signed: Nerissa Lopez MD    Current Medications after this visit     Current Outpatient Medications   Medication Sig    amLODIPine (NORVASC) 10 mg tablet TAKE 1 TABLET DAILY    atorvastatin (LIPITOR) 10 mg tablet TAKE 1 TABLET DAILY    furosemide (LASIX) 40 mg tablet Take 1 Tab by mouth daily.  isosorbide mononitrate ER (IMDUR) 30 mg tablet TAKE 1 TABLET BY MOUTH ONCE DAILY IN THE MORNING    levothyroxine (SYNTHROID) 137 mcg tablet TAKE 1 TABLET DAILY BEFORE BREAKFAST    losartan (COZAAR) 100 mg tablet TAKE 1 TABLET DAILY    oxybutynin (DITROPAN) 5 mg tablet Take 1 Tab by mouth nightly.     amiodarone (CORDARONE) 200 mg tablet TAKE 1 TABLET DAILY    rivaroxaban (Xarelto) 20 mg tab tablet TAKE 1 TABLET DAILY    topiramate (TOPAMAX) 25 mg tablet One tab by mouth QHS for one week then two PO Qhs    celecoxib (CELEBREX) 100 mg capsule Take 1 Cap by mouth two (2) times a day.  acetaminophen (TYLENOL EXTRA STRENGTH PO) Take  by mouth. No current facility-administered medications for this visit. Medications Discontinued During This Encounter   Medication Reason    dulaglutide (Trulicity) 1.5 VI/8.3 mL sub-q pen Not A Current Medication    amiodarone (CORDARONE) 200 mg tablet REORDER    amLODIPine (NORVASC) 10 mg tablet REORDER    atorvastatin (LIPITOR) 10 mg tablet REORDER    furosemide (LASIX) 40 mg tablet REORDER    isosorbide mononitrate ER (IMDUR) 30 mg tablet REORDER    levothyroxine (SYNTHROID) 137 mcg tablet REORDER    losartan (COZAAR) 100 mg tablet REORDER    oxybutynin (DITROPAN) 5 mg tablet REORDER    rivaroxaban (Xarelto) 20 mg tab tablet REORDER     ~~~~~~~~~~~~~~~~~~~~~~~~~~~~~~~~~~~~~~~~~~~~~~~~~~~~~~~~~~~    Chief Complaint   Patient presents with   [facility] Follow Up     ER follow-up visit  knee pain        History provided by patient  History of Present Illness   Jermaine Parks is a 77 y.o. female who presents to clinic today for:  Hospital Follow Up (ER follow-up visit  knee pain )    FU of ER visit Sunday   Went to [facility] she has severe arthritis right knee  Severe arthritis  Taking tylenol only  Cannot tolerate hydrocodone, itching and hallucination  Used lidocaine patch which didn't help much. Remains on celebrex. but insurance company does not want her to take (on Xarelto)  No new injury to knee  Did a lot of housework the day prior to ER visit. DM2  Has been off of Trulicity for 6 months. Blood sugar remains ok despite stopping that med  Remains hungry all the time. Staying off of sugar. Continues to have a potato issue.     Health Maintenance  Will do at future visit, Asked patient to schedule a Wellness appt to discuss issues. Health Maintenance Due   Topic Date Due    DTaP/Tdap/Td series (1 - Tdap) 01/04/1976    Shingrix Vaccine Age 50> (2 of 2) 09/24/2019    Bone Densitometry (Dexa) Screening  Never done    COVID-19 Vaccine (2 - Pfizer 2-dose series) 04/13/2021     Review of Systems   Review of Systems   Constitutional: Positive for weight loss. Negative for chills and fever. Respiratory: Negative for cough and shortness of breath. Cardiovascular: Negative for chest pain and leg swelling. Gastrointestinal: Negative. Genitourinary: Negative. Musculoskeletal: Positive for joint pain. Psychiatric/Behavioral: Negative. Vitals/Objective:     Vitals:    04/13/21 1047 04/13/21 1056   BP: (!) 180/74 (!) 147/54   Pulse: (!) 48    Resp: 22    Temp: 98.2 °F (36.8 °C)    TempSrc: Temporal    SpO2: 97%    Weight: 345 lb 3.2 oz (156.6 kg)    Height: 5' 8\" (1.727 m)      Body mass index is 52.49 kg/m². Wt Readings from Last 3 Encounters:   04/13/21 345 lb 3.2 oz (156.6 kg)   11/23/20 (!) 356 lb (161.5 kg)   10/14/20 (!) 353 lb 9.6 oz (160.4 kg)         Objective  Physical Exam  Vitals signs and nursing note reviewed. Constitutional:       Appearance: Normal appearance. She is not toxic-appearing. HENT:      Head: Normocephalic and atraumatic. Neck:      Musculoskeletal: No muscular tenderness. Cardiovascular:      Rate and Rhythm: Normal rate and regular rhythm. Heart sounds: Normal heart sounds. No murmur. No gallop. Pulmonary:      Effort: Pulmonary effort is normal. No respiratory distress. Breath sounds: Normal breath sounds. No wheezing, rhonchi or rales. Lymphadenopathy:      Cervical: No cervical adenopathy. Neurological:      Mental Status: She is alert. Psychiatric:         Mood and Affect: Mood normal.         Behavior: Behavior normal.         Thought Content:  Thought content normal.         Judgment: Judgment normal.       Right knee can flex to about 110, stopped by pain  Large knees. Normal extension  Neg Lachman and Drawer  Pain with valgus stress  No pain with varus stress  Limps on knee  No effusion or redness. Nontender to touch. No results found for this or any previous visit (from the past 24 hour(s)). Disposition     No future appointments. History   Patient's past medical, surgical and family histories were reviewed and updated.     Past Medical History:   Diagnosis Date    Atrial fibrillation (HCC)     Chronic renal failure     Diabetes ([facility] [address]     Hearing loss     Hypertension     Migraine     Morbid obesity (HCC)     Osteoarthritis     Sleep apnea     no CPAP    Thyroid disease      Past Surgical History:   Procedure Laterality Date    COLONOSCOPY N/A 12/4/2018    COL[facility] performed [facility]ak[facility]ala [facility] at [facility], normal, repeat 5 yeasr    HX DILATION AND CURETTAGE      HX HYSTERECTOMY      CO CARDIAC SURG PROCEDURE UNLIST      cardioversion 11/16/2018    CO CARDIAC SURG PROCEDURE UNLIST      ablation, 2016     Family History   Problem Relation Age of Onset    Heart Disease Mother     Hypertension Mother     Diabetes Mother      Social History     Tobacco Use    Smoking status: Former Smoker     Years: 1.00     Types: Cigarettes    Smokeless tobacco: Never Used   Substance Use Topics    Alcohol use: No    Drug use: No       Allergies     Allergies   Allergen Reactions    Ibuprofen Hives    Flowers Unable to [employer] Unable To Obtain Unable to [facility]

## 2021-04-19 NOTE — PROGRESS NOTES
Your blood work looks OK. The diabetic control remains OK.   Keep working on the weight as we discussed and try the medication to control your appetite  [facility]

## 2021-06-22 DIAGNOSIS — E78.00 HYPERCHOLESTEREMIA: ICD-10-CM

## 2021-06-22 DIAGNOSIS — R30.9 URINARY PAIN: ICD-10-CM

## 2021-06-22 DIAGNOSIS — I10 ESSENTIAL HYPERTENSION: ICD-10-CM

## 2021-06-22 DIAGNOSIS — E03.9 ACQUIRED HYPOTHYROIDISM: ICD-10-CM

## 2021-06-22 DIAGNOSIS — I48.20 CHRONIC ATRIAL FIBRILLATION (HCC): ICD-10-CM

## 2021-06-23 RX ORDER — FUROSEMIDE 40 MG/1
TABLET ORAL
Qty: 90 TABLET | Refills: 1 | Status: SHIPPED | OUTPATIENT
Start: 2021-06-23 | End: 2021-12-10 | Stop reason: SDUPTHER

## 2021-06-23 RX ORDER — ATORVASTATIN CALCIUM 10 MG/1
TABLET, FILM COATED ORAL
Qty: 90 TABLET | Refills: 1 | Status: SHIPPED | OUTPATIENT
Start: 2021-06-23 | End: 2022-02-21

## 2021-06-23 RX ORDER — AMLODIPINE BESYLATE 10 MG/1
TABLET ORAL
Qty: 90 TABLET | Refills: 1 | Status: SHIPPED | OUTPATIENT
Start: 2021-06-23 | End: 2022-02-21 | Stop reason: SDUPTHER

## 2021-06-23 RX ORDER — OXYBUTYNIN CHLORIDE 5 MG/1
TABLET ORAL
Qty: 90 TABLET | Refills: 1 | Status: SHIPPED | OUTPATIENT
Start: 2021-06-23 | End: 2021-12-10 | Stop reason: SDUPTHER

## 2021-06-23 RX ORDER — LEVOTHYROXINE SODIUM 137 UG/1
TABLET ORAL
Qty: 90 TABLET | Refills: 1 | Status: SHIPPED | OUTPATIENT
Start: 2021-06-23 | End: 2021-12-10 | Stop reason: SDUPTHER

## 2021-06-23 RX ORDER — ISOSORBIDE MONONITRATE 30 MG/1
TABLET, EXTENDED RELEASE ORAL
Qty: 90 TABLET | Refills: 1 | Status: SHIPPED | OUTPATIENT
Start: 2021-06-23 | End: 2021-12-10 | Stop reason: SDUPTHER

## 2021-06-23 RX ORDER — LOSARTAN POTASSIUM 100 MG/1
TABLET ORAL
Qty: 90 TABLET | Refills: 1 | Status: SHIPPED | OUTPATIENT
Start: 2021-06-23 | End: 2021-12-10 | Stop reason: SDUPTHER

## 2021-06-23 RX ORDER — AMIODARONE HYDROCHLORIDE 200 MG/1
TABLET ORAL
Qty: 90 TABLET | Refills: 1 | Status: SHIPPED | OUTPATIENT
Start: 2021-06-23 | End: 2022-02-21 | Stop reason: SDUPTHER

## 2021-08-18 ENCOUNTER — OFFICE VISIT (OUTPATIENT)
Dept: FAMILY MEDICINE CLINIC | Age: 66
End: 2021-08-18
Payer: MEDICARE

## 2021-08-18 VITALS
BODY MASS INDEX: 43.4 KG/M2 | SYSTOLIC BLOOD PRESSURE: 175 MMHG | HEART RATE: 44 BPM | HEIGHT: 69 IN | WEIGHT: 293 LBS | RESPIRATION RATE: 24 BRPM | OXYGEN SATURATION: 97 % | TEMPERATURE: 98 F | DIASTOLIC BLOOD PRESSURE: 69 MMHG

## 2021-08-18 DIAGNOSIS — M19.90 OSTEOARTHRITIS, UNSPECIFIED OSTEOARTHRITIS TYPE, UNSPECIFIED SITE: ICD-10-CM

## 2021-08-18 DIAGNOSIS — E66.01 MORBID OBESITY WITH BMI OF 45.0-49.9, ADULT (HCC): ICD-10-CM

## 2021-08-18 DIAGNOSIS — I48.20 CHRONIC ATRIAL FIBRILLATION (HCC): Primary | ICD-10-CM

## 2021-08-18 DIAGNOSIS — E11.21 TYPE 2 DIABETES WITH NEPHROPATHY (HCC): ICD-10-CM

## 2021-08-18 DIAGNOSIS — R00.1 BRADYCARDIA: ICD-10-CM

## 2021-08-18 PROCEDURE — G8536 NO DOC ELDER MAL SCRN: HCPCS | Performed by: FAMILY MEDICINE

## 2021-08-18 PROCEDURE — 1101F PT FALLS ASSESS-DOCD LE1/YR: CPT | Performed by: FAMILY MEDICINE

## 2021-08-18 PROCEDURE — G8754 DIAS BP LESS 90: HCPCS | Performed by: FAMILY MEDICINE

## 2021-08-18 PROCEDURE — 93000 ELECTROCARDIOGRAM COMPLETE: CPT | Performed by: FAMILY MEDICINE

## 2021-08-18 PROCEDURE — G8427 DOCREV CUR MEDS BY ELIG CLIN: HCPCS | Performed by: FAMILY MEDICINE

## 2021-08-18 PROCEDURE — G8417 CALC BMI ABV UP PARAM F/U: HCPCS | Performed by: FAMILY MEDICINE

## 2021-08-18 PROCEDURE — 99215 OFFICE O/P EST HI 40 MIN: CPT | Performed by: FAMILY MEDICINE

## 2021-08-18 PROCEDURE — 2022F DILAT RTA XM EVC RTNOPTHY: CPT | Performed by: FAMILY MEDICINE

## 2021-08-18 PROCEDURE — 3017F COLORECTAL CA SCREEN DOC REV: CPT | Performed by: FAMILY MEDICINE

## 2021-08-18 PROCEDURE — G8753 SYS BP > OR = 140: HCPCS | Performed by: FAMILY MEDICINE

## 2021-08-18 PROCEDURE — 1090F PRES/ABSN URINE INCON ASSESS: CPT | Performed by: FAMILY MEDICINE

## 2021-08-18 PROCEDURE — G9717 DOC PT DX DEP/BP F/U NT REQ: HCPCS | Performed by: FAMILY MEDICINE

## 2021-08-18 PROCEDURE — G9899 SCRN MAM PERF RSLTS DOC: HCPCS | Performed by: FAMILY MEDICINE

## 2021-08-18 PROCEDURE — 3044F HG A1C LEVEL LT 7.0%: CPT | Performed by: FAMILY MEDICINE

## 2021-08-18 PROCEDURE — G8400 PT W/DXA NO RESULTS DOC: HCPCS | Performed by: FAMILY MEDICINE

## 2021-08-18 RX ORDER — TRAMADOL HYDROCHLORIDE 50 MG/1
50 TABLET ORAL
Qty: 30 TABLET | Refills: 0 | Status: SHIPPED | OUTPATIENT
Start: 2021-08-18 | End: 2021-09-17

## 2021-08-18 NOTE — PROGRESS NOTES
1. Have you been to the ER, urgent care clinic since your last visit? No  Hospitalized since your last visit? No     2. Have you seen or consulted any other health care providers outside of the [address] [place] [address] since your last visit? No    Include any pap smears or colon screening.

## 2021-08-18 NOTE — PROGRESS NOTES
Sonja Proctor  77 y.o. female  1955  [id number]  [facility] MEDICINE  Progress Note     Encounter Date: 8/18/2021    Assessment and Plan:     Encounter Diagnoses     ICD-10-CM ICD-9-CM   1. Chronic atrial fibrillation (HCC)  I48.20 427.31   2. Morbid obesity with BMI of 45.0-49.9, adult ([facility] [address]  E66.01 278.01    Z68.42 V85.42   3. Osteoarthritis, unspecified osteoarthritis type, unspecified site  M19.90 715.90   4. Bradycardia  R00.1 427.89   5. Type 2 diabetes with nephropathy (HCC)  E11.21 250.40     583.81       1. Chronic atrial fibrillation (HCC)  Very slow rhythm. Made her an appointment to see Dr. Duke Bowne this week. - AMB POC EKG ROUTINE W/ 12 LEADS, INTER & REP    2. Morbid obesity with BMI of 45.0-49.9, adult ([facility]  Losing weight. Now has to in order to get TKR's  Continue no sugar, low starch diet with intermittent fasting  Resume trulicity. Samples given    3. Osteoarthritis, unspecified osteoarthritis type, unspecified site  Trial of QHS only ultram, tylenol during the day  - traMADoL (ULTRAM) 50 mg tablet; Take 1 Tablet by mouth every six (6) hours as needed for Pain for up to 30 days. Max Daily Amount: 200 mg. Dispense: 30 Tablet; Refill: 0    4. Bradycardia  Noted on exam and HR in the 30's at home  appt with Dr. Duke Bowen as above. 5. DM2  Back to Trulicity-samples given      I have discussed the diagnosis with the patient and the intended plan as seen in the above orders. she has expressed understanding. The patient has received an after-visit summary and questions were answered concerning future plans. I have discussed medication side effects and warnings with the patient as well. Electronically Signed: Trent Mitchell MD    Current Medications after this visit     Current Outpatient Medications   Medication Sig    dulaglutide (TRULICITY) 1.5 OK/8.5 mL sub-q pen 1.5 mg by SubCUTAneous route every seven (7) days.     traMADoL (ULTRAM) 50 mg tablet Take 1 Tablet by mouth every six (6) hours as needed for Pain for up to 30 days. Max Daily Amount: 200 mg.  furosemide (LASIX) 40 mg tablet TAKE 1 TABLET DAILY    atorvastatin (LIPITOR) 10 mg tablet TAKE 1 TABLET DAILY    isosorbide mononitrate ER (IMDUR) 30 mg tablet TAKE 1 TABLET EVERY MORNING    oxybutynin (DITROPAN) 5 mg tablet TAKE 1 TABLET NIGHTLY    amLODIPine (NORVASC) 10 mg tablet TAKE 1 TABLET DAILY    amiodarone (CORDARONE) 200 mg tablet TAKE 1 TABLET DAILY    levothyroxine (Synthroid) 137 mcg tablet TAKE 1 TABLET DAILY BEFORE BREAKFAST    losartan (COZAAR) 100 mg tablet TAKE 1 TABLET DAILY    rivaroxaban (Xarelto) 20 mg tab tablet TAKE 1 TABLET DAILY    acetaminophen (TYLENOL EXTRA STRENGTH PO) Take  by mouth. No current facility-administered medications for this visit. Medications Discontinued During This Encounter   Medication Reason    topiramate (TOPAMAX) 25 mg tablet Not A Current Medication    celecoxib (CELEBREX) 100 mg capsule Not A Current Medication     ~~~~~~~~~~~~~~~~~~~~~~~~~~~~~~~~~~~~~~~~~~~~~~~~~~~~~~~~~~~    Chief Complaint   Patient presents with    Generalized Body Aches     knees,feet,right earache,headache       History provided by patient  History of Present Illness   Omero Santacruz is a 77 y.o. female who presents to clinic today for:  Generalized Body Aches (knees,feet,right earache,headache)    Falling apart basically  Knees both. Saw Ortho two weeks ago and they cannot do more steroid injections. They said she needs to lose 100 pounds in order to have surgery. Pain even wakes her at night when she rolls over  Feels she needs something for pain. Current weight 356>>329 past nine months    Regarding heart  Has not seen Dr. Gwendolyn Hurt in a year and a half  Notes low heart rate in 30's  Remains on meds noted in today's notes. Health Maintenance  Will do at future visit, Asked patient to schedule a Wellness appt to discuss issues.   Health Maintenance Due   Topic Date Due  DTaP/Tdap/Td series (1 - Tdap) 10/19/2018    Shingrix Vaccine Age 50> (2 of 2) 09/24/2019    Bone Densitometry (Dexa) Screening  Never done    COVID-19 Vaccine (2 - Pfizer 2-dose series) 04/13/2021    Breast Cancer Screen Mammogram  06/10/2021    Foot Exam Q1  08/17/2021    Lipid Screen  08/17/2021    Medicare Yearly Exam  08/18/2021     Review of Systems   Review of Systems   Constitutional: Negative for chills, fever and weight loss. Respiratory: Negative for shortness of breath. Cardiovascular: Negative for chest pain and leg swelling. Musculoskeletal: Positive for back pain, joint pain and myalgias. Neurological: Negative for loss of consciousness. Psychiatric/Behavioral: Negative. Vitals/Objective:     Vitals:    08/18/21 1447 08/18/21 1450   BP: (!) 172/65 (!) 175/69   Pulse: (!) 44    Resp: 24    Temp: 98 °F (36.7 °C)    TempSrc: Temporal    SpO2: 97%    Weight: 329 lb 6.4 oz (149.4 kg)    Height: 5' 8.5\" (1.74 m)      Body mass index is 49.36 kg/m². Wt Readings from Last 3 Encounters:   08/18/21 329 lb 6.4 oz (149.4 kg)   04/13/21 345 lb 3.2 oz (156.6 kg)   11/23/20 (!) 356 lb (161.5 kg)         Objective  Physical Exam  Vitals and nursing note reviewed. Constitutional:       Appearance: Normal appearance. She is not toxic-appearing. HENT:      Head: Normocephalic and atraumatic. Cardiovascular:      Rate and Rhythm: Regular rhythm. Bradycardia present. Heart sounds: Normal heart sounds. No murmur heard. No gallop. Pulmonary:      Effort: Pulmonary effort is normal. No respiratory distress. Breath sounds: Normal breath sounds. No wheezing, rhonchi or rales. Musculoskeletal:      Cervical back: No muscular tenderness. Lymphadenopathy:      Cervical: No cervical adenopathy. Neurological:      Mental Status: She is alert. Psychiatric:         Mood and Affect: Mood normal.         Behavior: Behavior normal.         Thought Content:  Thought content normal.         Judgment: Judgment normal.       EKG-no clear P waves. High junctional rhythm? Rate 52, old anterior MI. No syncope so will get in to see Dr. Rocío Dowell as outpatient    No results found for this or any previous visit (from the past 24 hour(s)). Disposition     Follow-up and Dispositions  ·   Return in about 1 month (around 9/18/2021) for Medication follow up. No future appointments. History   Patient's past medical, surgical and family histories were reviewed and updated.     Past Medical History:   Diagnosis Date    Atrial fibrillation (HCC)     Chronic renal failure     Diabetes ([facility] [address]     Hearing loss     Hypertension     Migraine     Morbid obesity (HCC)     Osteoarthritis     Sleep apnea     no CPAP    Thyroid disease      Past Surgical History:   Procedure Laterality Date    COLONOSCOPY N/A 12/4/2018    COLONOSCOPY performed by Naldo Mcginnis [facility] at [facility], normal, repeat 5 yeasr    HX DILATION AND CURETTAGE      HX HYSTERECTOMY      [place] CARDIAC SURG PROCEDURE UNLIST      cardioversion 11/16/2018    AK CARDIAC SURG PROCEDURE UNLIST      ablation, 2016     Family History   Problem Relation Age of Onset    Heart Disease Mother     Hypertension Mother     Diabetes Mother      Social History     Tobacco Use    Smoking status: Former Smoker     Years: 1.00     Types: Cigarettes    Smokeless tobacco: Never Used   Substance Use Topics    Alcohol use: No    Drug use: No       Allergies     Allergies   Allergen Reactions    Ibuprofen Hives    Flowers Unable to [place] Unable To Obtain Unable to [facility]

## 2021-09-20 ENCOUNTER — OFFICE VISIT (OUTPATIENT)
Dept: FAMILY MEDICINE CLINIC | Age: 66
End: 2021-09-20
Payer: MEDICARE

## 2021-09-20 VITALS
BODY MASS INDEX: 43.4 KG/M2 | OXYGEN SATURATION: 97 % | WEIGHT: 293 LBS | SYSTOLIC BLOOD PRESSURE: 105 MMHG | TEMPERATURE: 97.1 F | HEIGHT: 69 IN | RESPIRATION RATE: 24 BRPM | DIASTOLIC BLOOD PRESSURE: 69 MMHG | HEART RATE: 63 BPM

## 2021-09-20 DIAGNOSIS — Z00.00 MEDICARE ANNUAL WELLNESS VISIT, SUBSEQUENT: Primary | ICD-10-CM

## 2021-09-20 DIAGNOSIS — R00.1 BRADYCARDIA: ICD-10-CM

## 2021-09-20 DIAGNOSIS — E11.21 TYPE 2 DIABETES WITH NEPHROPATHY (HCC): ICD-10-CM

## 2021-09-20 DIAGNOSIS — M19.90 OSTEOARTHRITIS, UNSPECIFIED OSTEOARTHRITIS TYPE, UNSPECIFIED SITE: ICD-10-CM

## 2021-09-20 PROCEDURE — G8417 CALC BMI ABV UP PARAM F/U: HCPCS | Performed by: FAMILY MEDICINE

## 2021-09-20 PROCEDURE — G8536 NO DOC ELDER MAL SCRN: HCPCS | Performed by: FAMILY MEDICINE

## 2021-09-20 PROCEDURE — G0439 PPPS, SUBSEQ VISIT: HCPCS | Performed by: FAMILY MEDICINE

## 2021-09-20 PROCEDURE — 99214 OFFICE O/P EST MOD 30 MIN: CPT | Performed by: FAMILY MEDICINE

## 2021-09-20 PROCEDURE — G8427 DOCREV CUR MEDS BY ELIG CLIN: HCPCS | Performed by: FAMILY MEDICINE

## 2021-09-20 PROCEDURE — G8400 PT W/DXA NO RESULTS DOC: HCPCS | Performed by: FAMILY MEDICINE

## 2021-09-20 PROCEDURE — 2022F DILAT RTA XM EVC RTNOPTHY: CPT | Performed by: FAMILY MEDICINE

## 2021-09-20 PROCEDURE — G8754 DIAS BP LESS 90: HCPCS | Performed by: FAMILY MEDICINE

## 2021-09-20 PROCEDURE — G8752 SYS BP LESS 140: HCPCS | Performed by: FAMILY MEDICINE

## 2021-09-20 PROCEDURE — 1090F PRES/ABSN URINE INCON ASSESS: CPT | Performed by: FAMILY MEDICINE

## 2021-09-20 PROCEDURE — 3017F COLORECTAL CA SCREEN DOC REV: CPT | Performed by: FAMILY MEDICINE

## 2021-09-20 PROCEDURE — G9899 SCRN MAM PERF RSLTS DOC: HCPCS | Performed by: FAMILY MEDICINE

## 2021-09-20 PROCEDURE — G9717 DOC PT DX DEP/BP F/U NT REQ: HCPCS | Performed by: FAMILY MEDICINE

## 2021-09-20 PROCEDURE — 3044F HG A1C LEVEL LT 7.0%: CPT | Performed by: FAMILY MEDICINE

## 2021-09-20 PROCEDURE — 1101F PT FALLS ASSESS-DOCD LE1/YR: CPT | Performed by: FAMILY MEDICINE

## 2021-09-20 RX ORDER — TRAMADOL HYDROCHLORIDE 50 MG/1
50 TABLET ORAL
Qty: 60 TABLET | Refills: 0 | Status: SHIPPED | OUTPATIENT
Start: 2021-09-20 | End: 2021-10-20

## 2021-09-20 NOTE — PATIENT INSTRUCTIONS
Medicare Wellness Visit, Female     The best way to live healthy is to have a lifestyle where you eat a well-balanced diet, exercise regularly, limit alcohol use, and quit all forms of tobacco/nicotine, if applicable. Regular preventive services are another way to keep healthy. Preventive services (vaccines, screening tests, monitoring & exams) can help personalize your care plan, which helps you manage your own care. Screening tests can find health problems at the earliest stages, when they are easiest to treat. [facility] follows the current, evidence-based guidelines published by the [facility] ([facility]) when recommending preventive services for our patients. Because we follow these guidelines, sometimes recommendations change over time as research supports it. (For example, mammograms used to be recommended annually. Even though Medicare will still pay for an annual mammogram, the newer guidelines recommend a mammogram every two years for women of average risk). Of course, you and your doctor may decide to screen more often for some diseases, based on your risk and your co-morbidities (chronic disease you are already diagnosed with). Preventive services for you include:  - Medicare offers their members a free annual wellness visit, which is time for you and your primary care provider to discuss and plan for your preventive service needs. Take advantage of this benefit every year!  -All adults over the age of 72 should receive the recommended pneumonia vaccines. Current USPSTF guidelines recommend a series of two vaccines for the best pneumonia protection.   -All adults should have a flu vaccine yearly and a tetanus vaccine every 10 years.   -All adults age 48 and older should receive the shingles vaccines (series of two vaccines).       -All adults age 38-68 who are overweight should have a diabetes screening test once every three years.   -All adults born between 80 and 1965 should be screened once for Hepatitis C.  -Other screening tests and preventive services for persons with diabetes include: an eye exam to screen for diabetic retinopathy, a kidney function test, a foot exam, and stricter control over your cholesterol.   -Cardiovascular screening for adults with routine risk involves an electrocardiogram (ECG) at intervals determined by your doctor.   -Colorectal cancer screenings should be done for adults age 54-65 with no increased risk factors for colorectal cancer. There are a number of acceptable methods of screening for this type of cancer. Each test has its own benefits and drawbacks. Discuss with your doctor what is most appropriate for you during your annual wellness visit. The different tests include: colonoscopy (considered the best screening method), a fecal occult blood test, a fecal DNA test, and sigmoidoscopy.    -A bone mass density test is recommended when a woman turns 65 to screen for osteoporosis. This test is only recommended one time, as a screening. Some providers will use this same test as a disease monitoring tool if you already have osteoporosis. -Breast cancer screenings are recommended every other year for women of normal risk, age 54-69.  -Cervical cancer screenings for women over age 72 are only recommended with certain risk factors.      Here is a list of your current Health Maintenance items (your personalized list of preventive services) with a due date:  Health Maintenance Due   Topic Date Due    Shingles Vaccine (2 of 2) 09/24/2019    Bone Mineral Density   Never done    COVID-19 Vaccine (2 - Pfizer 2-dose series) 04/13/2021    Mammogram  06/10/2021    Diabetic Foot Care  08/17/2021    Cholesterol Test   08/17/2021    Annual Well Visit  08/18/2021    Yearly Flu Vaccine (1) 09/01/2021    Eye Exam  09/18/2021         Medicare Wellness Visit, Female     The best way to live healthy is to have a lifestyle where you eat a well-balanced diet, exercise regularly, limit alcohol use, and quit all forms of tobacco/nicotine, if applicable. Regular preventive services are another way to keep healthy. Preventive services (vaccines, screening tests, monitoring & exams) can help personalize your care plan, which helps you manage your own care. Screening tests can find health problems at the earliest stages, when they are easiest to treat. [facility] follows the current, evidence-based guidelines published by the [facility] ([facility]) when recommending preventive services for our patients. Because we follow these guidelines, sometimes recommendations change over time as research supports it. (For example, mammograms used to be recommended annually. Even though Medicare will still pay for an annual mammogram, the newer guidelines recommend a mammogram every two years for women of average risk). Of course, you and your doctor may decide to screen more often for some diseases, based on your risk and your co-morbidities (chronic disease you are already diagnosed with). Preventive services for you include:  - Medicare offers their members a free annual wellness visit, which is time for you and your primary care provider to discuss and plan for your preventive service needs. Take advantage of this benefit every year!  -All adults over the age of 72 should receive the recommended pneumonia vaccines. Current USPSTF guidelines recommend a series of two vaccines for the best pneumonia protection.   -All adults should have a flu vaccine yearly and a tetanus vaccine every 10 years.   -All adults age 48 and older should receive the shingles vaccines (series of two vaccines).       -All adults age 38-68 who are overweight should have a diabetes screening test once every three years.   -All adults born between 80 and 1965 should be screened once for Hepatitis C.  -Other screening tests and preventive services for persons with diabetes include: an eye exam to screen for diabetic retinopathy, a kidney function test, a foot exam, and stricter control over your cholesterol.   -Cardiovascular screening for adults with routine risk involves an electrocardiogram (ECG) at intervals determined by your doctor.   -Colorectal cancer screenings should be done for adults age 54-65 with no increased risk factors for colorectal cancer. There are a number of acceptable methods of screening for this type of cancer. Each test has its own benefits and drawbacks. Discuss with your doctor what is most appropriate for you during your annual wellness visit. The different tests include: colonoscopy (considered the best screening method), a fecal occult blood test, a fecal DNA test, and sigmoidoscopy.    -A bone mass density test is recommended when a woman turns 65 to screen for osteoporosis. This test is only recommended one time, as a screening. Some providers will use this same test as a disease monitoring tool if you already have osteoporosis. -Breast cancer screenings are recommended every other year for women of normal risk, age 54-69.  -Cervical cancer screenings for women over age 72 are only recommended with certain risk factors.      Here is a list of your current Health Maintenance items (your personalized list of preventive services) with a due date:  Health Maintenance Due   Topic Date Due    Shingles Vaccine (2 of 2) 09/24/2019    Bone Mineral Density   Never done    COVID-19 Vaccine (2 - Pfizer 2-dose series) 04/13/2021    Mammogram  06/10/2021    Diabetic Foot Care  08/17/2021    Cholesterol Test   08/17/2021    Yearly Flu Vaccine (1) 09/01/2021    Eye Exam  09/18/2021

## 2021-09-20 NOTE — PROGRESS NOTES
Javed Garcia  77 y.o. female  1955  [id number]  [facility] MEDICINE  Progress Note     Encounter Date: 9/20/2021    Assessment and Plan:     Encounter Diagnoses     ICD-10-CM ICD-9-CM   1. Osteoarthritis, unspecified osteoarthritis type, unspecified site  M19.90 715.90   2. Bradycardia  R00.1 427.89   3. Type 2 diabetes with nephropathy (HCC)  E11.21 250.40     583.81       1. Osteoarthritis, unspecified osteoarthritis type, unspecified site  Refill of tramadol to take prn and qhs for pain  - traMADoL (ULTRAM) 50 mg tablet; Take 1 Tablet by mouth every eight (8) hours as needed for Pain for up to 30 days. Max Daily Amount: 150 mg. Dispense: 60 Tablet; Refill: 0    2. Bradycardia  Encouraged to follow through with pacemaker with Dr. Fredrick Lei  Ask if can still get mammograms  Ask if can stop amiodarone    3. Type 2 diabetes with nephropathy ([facility] [address]  Doing well with weight loss with Trulicity and now back on topamax. Continue meds. Continue diet    I have discussed the diagnosis with the patient and the intended plan as seen in the above orders. she has expressed understanding. The patient has received an after-visit summary and questions were answered concerning future plans. I have discussed medication side effects and warnings with the patient as well. Electronically Signed: Facundo Manley MD    Current Medications after this visit     Current Outpatient Medications   Medication Sig    traMADoL (ULTRAM) 50 mg tablet Take 1 Tablet by mouth every eight (8) hours as needed for Pain for up to 30 days. Max Daily Amount: 150 mg.    dulaglutide (TRULICITY) 1.5 KD/3.6 mL sub-q pen 1.5 mg by SubCUTAneous route every seven (7) days.     furosemide (LASIX) 40 mg tablet TAKE 1 TABLET DAILY    atorvastatin (LIPITOR) 10 mg tablet TAKE 1 TABLET DAILY    isosorbide mononitrate ER (IMDUR) 30 mg tablet TAKE 1 TABLET EVERY MORNING    oxybutynin (DITROPAN) 5 mg tablet TAKE 1 TABLET NIGHTLY    amLODIPine (NORVASC) 10 mg tablet TAKE 1 TABLET DAILY    amiodarone (CORDARONE) 200 mg tablet TAKE 1 TABLET DAILY    levothyroxine (Synthroid) 137 mcg tablet TAKE 1 TABLET DAILY BEFORE BREAKFAST    losartan (COZAAR) 100 mg tablet TAKE 1 TABLET DAILY    rivaroxaban (Xarelto) 20 mg tab tablet TAKE 1 TABLET DAILY    acetaminophen (TYLENOL EXTRA STRENGTH PO) Take  by mouth. No current facility-administered medications for this visit. There are no discontinued medications. ~~~~~~~~~~~~~~~~~~~~~~~~~~~~~~~~~~~~~~~~~~~~~~~~~~~~~~~~~~~    Chief Complaint   Patient presents with    Follow-up     Medication and Dr. Bri Ferreira        History provided by patient  History of Present Illness   Oriana Thorpe is a 77 y.o. female who presents to clinic today for:  Follow-up (Medication and Dr. Bri Ferreira )    Dr. Bri Ferreira say she needs a pacemaker  Heart rate was between 34 and 38  She is worried about the procedure. Decided to try topamax again  Back on two every night  Remains on Trulicity. Doing well with diet. Knees and feet hurt all the time. Avoiding potatoes and bread, her biggest problems. Took some Tramadol after last visit. Slept better with Tramadol   Struggles to walk but it helped her sleep. Health Maintenance  Done today. Health Maintenance Due   Topic Date Due    Shingrix Vaccine Age 49> (2 of 2) 09/24/2019    Bone Densitometry (Dexa) Screening  Never done    COVID-19 Vaccine (2 - Pfizer 2-dose series) 04/13/2021    Breast Cancer Screen Mammogram  06/10/2021    Foot Exam Q1  08/17/2021    Lipid Screen  08/17/2021    Flu Vaccine (1) 09/01/2021    Eye Exam Retinal or Dilated  09/18/2021     Review of Systems   Review of Systems   Constitutional: Positive for malaise/fatigue and weight loss. Negative for chills and fever. Respiratory: Negative for shortness of breath. Cardiovascular: Negative for chest pain and leg swelling. Gastrointestinal: Negative for blood in stool.    Genitourinary: Negative for hematuria. Musculoskeletal: Positive for joint pain. Psychiatric/Behavioral: Negative. Vitals/Objective:     Vitals:    09/20/21 1437   BP: 105/69   Pulse: 63   Resp: 24   Temp: 97.1 °F (36.2 °C)   TempSrc: Temporal   SpO2: 97%   Weight: 315 lb (142.9 kg)   Height: 5' 8.5\" (1.74 m)     Body mass index is 47.2 kg/m². Wt Readings from Last 3 Encounters:   09/20/21 315 lb (142.9 kg)   08/18/21 329 lb 6.4 oz (149.4 kg)   04/13/21 345 lb 3.2 oz (156.6 kg)         Objective  Physical Exam  Vitals and nursing note reviewed. Constitutional:       Appearance: Normal appearance. She is not toxic-appearing. HENT:      Head: Normocephalic and atraumatic. Cardiovascular:      Rate and Rhythm: Regular rhythm. Bradycardia present. Heart sounds: Normal heart sounds. No murmur heard. No gallop. Comments: Rate 36  Pulmonary:      Effort: Pulmonary effort is normal. No respiratory distress. Breath sounds: Normal breath sounds. No wheezing, rhonchi or rales. Musculoskeletal:      Cervical back: No muscular tenderness. Lymphadenopathy:      Cervical: No cervical adenopathy. Neurological:      Mental Status: She is alert. Psychiatric:         Mood and Affect: Mood normal.         Behavior: Behavior normal.         Thought Content: Thought content normal.         Judgment: Judgment normal.       General: Patient alert and oriented and in NAD  Eyes: PER/EOMI, no conjunctival pallor or scleral icterus. ENT: Nares normal, TM's clear with normal architecture and light reflex, Mouth normal, throat without exudate, uvula midline  Neck: No thyromegaly or cervical lymphadenopathy  Cardiovascular: Heart has regular rate and rhythm, No murmurs, rubs or gallops. No edema  Respiratory: Lungs are clear to auscultation bilaterally, no wheezing, rales or rhonchi, normal chest excursion and no increased work of breathing.   Gastorintestinal: abdomen is non-tender, non-distended, without organomegaly or masses  Genitourinary: exam deferred  Musculoskeletal: All four extremities present and functional.   Skin: No rashes or lesions noted on exposed skin  Neuro: AAOx3, normal gait and speech. No gross neurologic deficits. Psych: Appropriate mood and affect, no homicidal or suicidal ideation, no obsessions, delusions or hallucinations, normal psychomotor status. No results found for this or any previous visit (from the past 24 hour(s)). Disposition     Follow-up and Dispositions  ·   Return in about 2 months (around 11/20/2021) for Medication follow up. No future appointments. History   Patient's past medical, surgical and family histories were reviewed and updated.     Past Medical History:   Diagnosis Date    Atrial fibrillation (HCC)     Chronic renal failure     Diabetes ([facility] [address]     Hearing loss     Hypertension     Migraine     Morbid obesity (HCC)     Osteoarthritis     Sleep apnea     no CPAP    Thyroid disease      Past Surgical History:   Procedure Laterality Date    COLONOSCOPY N/A 12/4/2018    COLONOSCOPY performed by Donny Thomas MD at [address], normal, repeat 5 yeasr    HX DILATION AND CURETTAGE      HX HYSTERECTOMY      CT CARDIAC SURG PROCEDURE UNLIST      cardioversion 11/16/2018    CT CARDIAC SURG PROCEDURE UNLIST      ablation, 2016     Family History   Problem Relation Age of Onset    Heart Disease Mother     Hypertension Mother     Diabetes Mother      Social History     Tobacco Use    Smoking status: Former Smoker     Years: 1.00     Types: Cigarettes    Smokeless tobacco: Never Used   Substance Use Topics    Alcohol use: No    Drug use: No       Allergies     Allergies   Allergen Reactions    Ibuprofen Hives    Flowers Unable to "[employer] Unable To Obtain Unable to Obtain                     This is the Subsequent Medicare Annual Wellness Exam, performed 12 months or more after the Initial AWV or the last Subsequent AWV    I have reviewed the patient's medical history in detail and updated the computerized patient record. Assessment/Plan   Education and counseling provided:  Are appropriate based on today's review and evaluation  End-of-Life planning (with patient's consent)    1. Osteoarthritis, unspecified osteoarthritis type, unspecified site  -     traMADoL (ULTRAM) 50 mg tablet; Take 1 Tablet by mouth every eight (8) hours as needed for Pain for up to 30 days. Max Daily Amount: 150 mg., Normal, Disp-60 Tablet, R-0  2. Bradycardia  3. Type 2 diabetes with nephropathy (HCC)       Depression Risk Factor Screening     3 most recent PHQ Screens 11/23/2020   Little interest or pleasure in doing things Several days   Feeling down, depressed, irritable, or hopeless Several days   Total Score PHQ 2 2       Alcohol Risk Screen    Do you average more than 1 drink per night or more than 7 drinks a week:  No    On any one occasion in the past three months have you have had more than 3 drinks containing alcohol:  No    Non smoker    Functional Ability and Level of Safety    Hearing: The patient needs further evaluation. Vision:  Needs    Activities of Daily Living: The home contains: grab bars  Patient does total self care      Ambulation: with difficulty, uses a walker or cane sometimes     Fall Risk:  Fall Risk Assessment, last 12 mths 8/18/2021   Able to walk? Yes   Fall in past 12 months? 0   Do you feel unsteady? -   Are you worried about falling -   Number of falls in past 12 months -   Fall with injury?  -      Abuse Screen:  Patient is not abused       Cognitive Screening    Has your family/caregiver stated any concerns about your memory: no     Cognitive Screening: Normal - interview    Health Maintenance Due     Health Maintenance Due   Topic Date Due    Shingrix Vaccine Age 49> (2 of 2) 09/24/2019    Bone Densitometry (Dexa) Screening  Never done    COVID-19 Vaccine (2 - Pfizer 2-dose series) 04/13/2021    Breast Cancer Screen Mammogram  06/10/2021    Foot Exam Q1  08/17/2021    Lipid Screen  08/17/2021    Flu Vaccine (1) 09/01/2021    Eye Exam Retinal or Dilated  09/18/2021       Patient Care Team   Patient Care Team:  Indira Strong MD as PCP - General (Family Medicine)  Indira Strong MD as PCP - [facility] Empaneled Provider    History     Patient Active Problem List   Diagnosis Code    Acquired hypothyroidism E03.9    Acquired trigger finger M65.30    Atrial fibrillation ([address] I48.91    Body mass index (BMI) of 50.0 to 59.9 in adult [facility]) Z68.43    Chronic kidney disease (CKD), stage II (mild) N18.2    Classic migraine G43. 109    Diffuse arthralgia M25.50    Essential hypertension I10    Hypercholesterolemia E78.00    Joint pain M25.50    Moderate major depression (HCC) F32.1    Neck pain M54.2    Type 2 diabetes mellitus (HCC) E11.9    Type 2 diabetes with nephropathy (HCC) E11.21    Morbid obesity (HCC) E66.01    Dyspnea R06.00     Past Medical History:   Diagnosis Date    Atrial fibrillation (HCC)     Chronic renal failure     Diabetes ([address]     Hearing loss     Hypertension     Migraine     Morbid obesity ([address]     Osteoarthritis     Sleep apnea     no CPAP    Thyroid disease       Past Surgical History:   Procedure Laterality Date    COLONOSCOPY N/A 12/4/2018    COLONOSCOPY performed by Beryle Snell, MD at [facility] [address], normal, repeat 5 yeasr    HX DILATION AND CURETTAGE      HX HYSTERECTOMY      NC CARDIAC SURG PROCEDURE UNLIST      cardioversion 11/16/2018    NC CARDIAC SURG PROCEDURE UNLIST      ablation, 2016     Current Outpatient Medications   Medication Sig Dispense Refill    traMADoL (ULTRAM) 50 mg tablet Take 1 Tablet by mouth every eight (8) hours as needed for Pain for up to 30 days. Max Daily Amount: 150 mg. 60 Tablet 0    dulaglutide (TRULICITY) 1.5 LT/2.4 mL sub-q pen 1.5 mg by SubCUTAneous route every seven (7) days.       furosemide (LASIX) 40 mg tablet TAKE 1 TABLET DAILY 90 Tablet 1    atorvastatin (LIPITOR) 10 mg tablet TAKE 1 TABLET DAILY 90 Tablet 1    isosorbide mononitrate ER (IMDUR) 30 mg tablet TAKE 1 TABLET EVERY MORNING 90 Tablet 1    oxybutynin (DITROPAN) 5 mg tablet TAKE 1 TABLET NIGHTLY 90 Tablet 1    amLODIPine (NORVASC) 10 mg tablet TAKE 1 TABLET DAILY 90 Tablet 1    amiodarone (CORDARONE) 200 mg tablet TAKE 1 TABLET DAILY 90 Tablet 1    levothyroxine (Synthroid) 137 mcg tablet TAKE 1 TABLET DAILY BEFORE BREAKFAST 90 Tablet 1    losartan (COZAAR) 100 mg tablet TAKE 1 TABLET DAILY 90 Tablet 1    rivaroxaban (Xarelto) 20 mg tab tablet TAKE 1 TABLET DAILY 90 Tab 1    acetaminophen (TYLENOL EXTRA STRENGTH PO) Take  by mouth.        Allergies   Allergen Reactions    Ibuprofen Hives    Flowers Unable to [employer] Unable To Obtain Unable to Obtain       Family History   Problem Relation Age of Onset    Heart Disease Mother     Hypertension Mother     Diabetes Mother      Social History     Tobacco Use    Smoking status: Former Smoker     Years: 1.00     Types: Cigarettes    Smokeless tobacco: Never Used   Substance Use Topics    Alcohol use: No         Sean Giang MD

## 2021-09-20 NOTE — PROGRESS NOTES
Aretha Almazan is a 77 y.o. female      Chief Complaint   Patient presents with    Follow-up     Medication and Dr. Reece Martin          1. Have you been to the ER, urgent care clinic since your last visit? Yes  [place] [address]  ER   Hospitalized since your last visit? No  No       2. Have you seen or consulted any other health care providers outside of the [address] [place] [address] since your last visit? Include any pap smears or colon screening.    Dr Reece Martin

## 2021-10-04 LAB — SARS-COV-2, NAA: NEGATIVE

## 2021-10-26 ENCOUNTER — PATIENT MESSAGE (OUTPATIENT)
Dept: FAMILY MEDICINE CLINIC | Age: 66
End: 2021-10-26

## 2021-10-26 ENCOUNTER — TELEPHONE (OUTPATIENT)
Dept: FAMILY MEDICINE CLINIC | Age: 66
End: 2021-10-26

## 2021-10-26 DIAGNOSIS — M19.90 OSTEOARTHRITIS, UNSPECIFIED OSTEOARTHRITIS TYPE, UNSPECIFIED SITE: Primary | ICD-10-CM

## 2021-10-26 DIAGNOSIS — I48.20 CHRONIC ATRIAL FIBRILLATION (HCC): ICD-10-CM

## 2021-10-26 RX ORDER — TRAMADOL HYDROCHLORIDE 50 MG/1
50 TABLET ORAL
Qty: 60 TABLET | Refills: 0 | Status: SHIPPED | OUTPATIENT
Start: 2021-10-26 | End: 2021-11-22 | Stop reason: SDUPTHER

## 2021-10-26 NOTE — TELEPHONE ENCOUNTER
10/26/21  2:10 PM    1. Chronic atrial fibrillation (HCC)  Refilled for local purchase  - rivaroxaban (Xarelto) 20 mg tab tablet; TAKE 1 TABLET DAILY  Dispense: 30 Tablet; Refill: 5    2. Osteoarthritis, unspecified osteoarthritis type, unspecified site  Has appt. Given refill  - traMADoL (ULTRAM) 50 mg tablet; Take 1 Tablet by mouth every eight (8) hours as needed for Pain for up to 30 days. Max Daily Amount: 150 mg. Dispense: 60 Tablet;  Refill: 0      Raymond Michael MD

## 2021-10-26 NOTE — TELEPHONE ENCOUNTER
----- Message from Raymond Michael MD sent at 10/26/2021  2:11 PM EDT -----  Regarding: FW: Visit Follow-Up Question  Contact: [phone]  Can you see if we have any extra Trulicity for her or see if we can apply to get her some like other patients from the drug company? [facility]  ----- Message -----  From: Renato Lott  Sent: 10/26/2021   1:13 PM EDT  To: Raymond Michael MD  Subject: RE: Visit Follow-Up Question                     See message    ----- Message -----  From: Sixto Parikh  Sent: 10/26/2021  11:58 AM EDT  To: [facility]  Subject: Visit Follow-Up Question                         Dr Rigo Noguera              I tried to get an appointment for 30 days after my last visit and the earliest I could get was Nov 22, I have a few meds that will run out by then, first is the topamax, then xarelto, then trilicity. The trilicity I still can't afford, the xarelto If you could write a prescription for 30 days I can get it cheaper locally, same with the topamax. Let me know when you can write them and I will pick them up.                                 Thank you                            Sixto Parikh

## 2021-10-26 NOTE — TELEPHONE ENCOUNTER
Tc to Mrs. Luz Marina Hardy    We have Sample for the Trulicity ( 1 month )      Per Mrs. Luz Marina Hardy, she will be by tomorrow morning to  samples.

## 2021-11-09 ENCOUNTER — PATIENT MESSAGE (OUTPATIENT)
Dept: FAMILY MEDICINE CLINIC | Age: 66
End: 2021-11-09

## 2021-11-09 DIAGNOSIS — I48.20 CHRONIC ATRIAL FIBRILLATION (HCC): ICD-10-CM

## 2021-11-09 DIAGNOSIS — E66.01 MORBID OBESITY WITH BMI OF 45.0-49.9, ADULT (HCC): Primary | ICD-10-CM

## 2021-11-11 RX ORDER — TOPIRAMATE 50 MG/1
50 TABLET, FILM COATED ORAL
Qty: 90 TABLET | Refills: 1 | Status: SHIPPED | OUTPATIENT
Start: 2021-11-11 | End: 2022-02-21 | Stop reason: SDUPTHER

## 2021-11-22 ENCOUNTER — OFFICE VISIT (OUTPATIENT)
Dept: FAMILY MEDICINE CLINIC | Age: 66
End: 2021-11-22
Payer: MEDICARE

## 2021-11-22 VITALS
HEART RATE: 60 BPM | HEIGHT: 69 IN | RESPIRATION RATE: 22 BRPM | SYSTOLIC BLOOD PRESSURE: 123 MMHG | DIASTOLIC BLOOD PRESSURE: 71 MMHG | BODY MASS INDEX: 43.4 KG/M2 | OXYGEN SATURATION: 97 % | WEIGHT: 293 LBS | TEMPERATURE: 97.9 F

## 2021-11-22 DIAGNOSIS — E11.21 TYPE 2 DIABETES WITH NEPHROPATHY (HCC): ICD-10-CM

## 2021-11-22 DIAGNOSIS — I48.20 CHRONIC ATRIAL FIBRILLATION (HCC): ICD-10-CM

## 2021-11-22 DIAGNOSIS — R00.1 BRADYCARDIA: Primary | ICD-10-CM

## 2021-11-22 DIAGNOSIS — M19.90 OSTEOARTHRITIS, UNSPECIFIED OSTEOARTHRITIS TYPE, UNSPECIFIED SITE: ICD-10-CM

## 2021-11-22 PROBLEM — F11.99 OPIOID USE, UNSPECIFIED WITH UNSPECIFIED OPIOID-INDUCED DISORDER (HCC): Status: ACTIVE | Noted: 2021-11-22

## 2021-11-22 PROCEDURE — G8754 DIAS BP LESS 90: HCPCS | Performed by: FAMILY MEDICINE

## 2021-11-22 PROCEDURE — 1101F PT FALLS ASSESS-DOCD LE1/YR: CPT | Performed by: FAMILY MEDICINE

## 2021-11-22 PROCEDURE — 99214 OFFICE O/P EST MOD 30 MIN: CPT | Performed by: FAMILY MEDICINE

## 2021-11-22 PROCEDURE — G8400 PT W/DXA NO RESULTS DOC: HCPCS | Performed by: FAMILY MEDICINE

## 2021-11-22 PROCEDURE — G8417 CALC BMI ABV UP PARAM F/U: HCPCS | Performed by: FAMILY MEDICINE

## 2021-11-22 PROCEDURE — 1090F PRES/ABSN URINE INCON ASSESS: CPT | Performed by: FAMILY MEDICINE

## 2021-11-22 PROCEDURE — 3017F COLORECTAL CA SCREEN DOC REV: CPT | Performed by: FAMILY MEDICINE

## 2021-11-22 PROCEDURE — 2022F DILAT RTA XM EVC RTNOPTHY: CPT | Performed by: FAMILY MEDICINE

## 2021-11-22 PROCEDURE — 3044F HG A1C LEVEL LT 7.0%: CPT | Performed by: FAMILY MEDICINE

## 2021-11-22 PROCEDURE — G8752 SYS BP LESS 140: HCPCS | Performed by: FAMILY MEDICINE

## 2021-11-22 PROCEDURE — G9717 DOC PT DX DEP/BP F/U NT REQ: HCPCS | Performed by: FAMILY MEDICINE

## 2021-11-22 PROCEDURE — G8536 NO DOC ELDER MAL SCRN: HCPCS | Performed by: FAMILY MEDICINE

## 2021-11-22 PROCEDURE — G9899 SCRN MAM PERF RSLTS DOC: HCPCS | Performed by: FAMILY MEDICINE

## 2021-11-22 PROCEDURE — G8427 DOCREV CUR MEDS BY ELIG CLIN: HCPCS | Performed by: FAMILY MEDICINE

## 2021-11-22 RX ORDER — TRAMADOL HYDROCHLORIDE 50 MG/1
50 TABLET ORAL
Qty: 60 TABLET | Refills: 0 | Status: SHIPPED | OUTPATIENT
Start: 2022-01-22 | End: 2022-02-21

## 2021-11-22 RX ORDER — TRAMADOL HYDROCHLORIDE 50 MG/1
50 TABLET ORAL
Qty: 60 TABLET | Refills: 0 | Status: SHIPPED | OUTPATIENT
Start: 2021-12-22 | End: 2021-11-22 | Stop reason: SDUPTHER

## 2021-11-22 RX ORDER — TRAMADOL HYDROCHLORIDE 50 MG/1
50 TABLET ORAL
Qty: 60 TABLET | Refills: 0 | Status: SHIPPED | OUTPATIENT
Start: 2021-11-22 | End: 2021-11-22 | Stop reason: SDUPTHER

## 2021-11-22 NOTE — PROGRESS NOTES
Aretha Rey is a 77 y.o. female      Chief Complaint   Patient presents with    Hypertension    Follow-up     Medication    Other     2 sore on lower back          1. Have you been to the ER, urgent care clinic since your last visit? No   Hospitalized since your last visit? No       2. Have you seen or consulted any other health care providers outside of the [address] [place] [address] since your last visit? Include any pap smears or colon screening.   Yes Dr Inga Parikh

## 2021-11-22 NOTE — PROGRESS NOTES
Alissa Espinoza  77 y.o. female  1955  CHAN:[id number]  [facility] MEDICINE  Progress Note     Encounter Date: 11/22/2021    Assessment and Plan:     Encounter Diagnoses     ICD-10-CM ICD-9-CM   1. Bradycardia  R00.1 427.89   2. Chronic atrial fibrillation (HCC)  I48.20 427.31   3. Osteoarthritis, unspecified osteoarthritis type, unspecified site  M19.90 715.90   4. Type 2 diabetes with nephropathy (HCC)  E11.21 250.40     583.81       1. Bradycardia  Now has pacemaker  Doing well  TO ask Dr. Isabella Vanessa about stopping amiodarone    2. Chronic atrial fibrillation (HCC)  Given RX to send to [facility] for cheaper price  - rivaroxaban (Xarelto) 20 mg tab tablet; TAKE 1 TABLET DAILY  Dispense: 90 Tablet; Refill: 1    3. Osteoarthritis, unspecified osteoarthritis type, unspecified site  Given written Rx for Tramadol for November, December and January   OK  Trial off of statin X one month to see if it makes a difference in her pain. - traMADoL (ULTRAM) 50 mg tablet; Take 1 Tablet by mouth every eight (8) hours as needed for Pain for up to 30 days. Max Daily Amount: 150 mg. Dispense: 60 Tablet; Refill: 0    4. Type 2 diabetes with nephropathy (HCC)  Given samples of Trulicity. I have discussed the diagnosis with the patient and the intended plan as seen in the above orders. she has expressed understanding. The patient has received an after-visit summary and questions were answered concerning future plans. I have discussed medication side effects and warnings with the patient as well. Electronically Signed: Tennille Farrell MD    Current Medications after this visit     Current Outpatient Medications   Medication Sig    rivaroxaban (Xarelto) 20 mg tab tablet TAKE 1 TABLET DAILY    [START ON 1/22/2022] traMADoL (ULTRAM) 50 mg tablet Take 1 Tablet by mouth every eight (8) hours as needed for Pain for up to 30 days. Max Daily Amount: 150 mg.     topiramate (TOPAMAX) 50 mg tablet Take 1 Tablet by mouth nightly.  dulaglutide (TRULICITY) 1.5 IK/4.7 mL sub-q pen 1.5 mg by SubCUTAneous route every seven (7) days.  furosemide (LASIX) 40 mg tablet TAKE 1 TABLET DAILY    atorvastatin (LIPITOR) 10 mg tablet TAKE 1 TABLET DAILY    isosorbide mononitrate ER (IMDUR) 30 mg tablet TAKE 1 TABLET EVERY MORNING    oxybutynin (DITROPAN) 5 mg tablet TAKE 1 TABLET NIGHTLY    amLODIPine (NORVASC) 10 mg tablet TAKE 1 TABLET DAILY    amiodarone (CORDARONE) 200 mg tablet TAKE 1 TABLET DAILY    levothyroxine (Synthroid) 137 mcg tablet TAKE 1 TABLET DAILY BEFORE BREAKFAST    losartan (COZAAR) 100 mg tablet TAKE 1 TABLET DAILY    acetaminophen (TYLENOL EXTRA STRENGTH PO) Take  by mouth. No current facility-administered medications for this visit. Medications Discontinued During This Encounter   Medication Reason    rivaroxaban (Xarelto) 20 mg tab tablet REORDER    traMADoL (ULTRAM) 50 mg tablet REORDER    traMADoL (ULTRAM) 50 mg tablet REORDER    traMADoL (ULTRAM) 50 mg tablet REORDER     ~~~~~~~~~~~~~~~~~~~~~~~~~~~~~~~~~~~~~~~~~~~~~~~~~~~~~~~~~~~    Chief Complaint   Patient presents with    Hypertension    Follow-up     Medication    Other     2 sore on lower back        History provided by patient  History of Present Illness   Lucho Harris is a 77 y.o. female who presents to clinic today for:  Hypertension, Follow-up (Medication), and Other (2 sore on lower back )    Hypertension  At goal    Bradycardia  Had pacemaker with Dr. Bronson Barr her back in January and considering stopping amiodarone    DM2  Continues to do well and lose weight  Lots of difficulty affording meds. Can she order Xarelto from [employer] Yes, but needs due diligence in checking out company from which she gets it. Body and back pain  Still hurts a lot  Remains on statin. Health Maintenance  Will do at future visit, Asked patient to schedule a Wellness appt to discuss issues.   Health Maintenance Due   Topic Date Due    Shingrix Vaccine Age 49> (2 of 2) 09/24/2019    Bone Densitometry (Dexa) Screening  Never done    COVID-19 Vaccine (2 - Pfizer 3-dose booster series) 04/13/2021    Breast Cancer Screen Mammogram  06/10/2021    Foot Exam Q1  08/17/2021    Lipid Screen  08/17/2021    Flu Vaccine (1) 09/01/2021    Eye Exam Retinal or Dilated  09/18/2021     Review of Systems   Review of Systems   Constitutional: Positive for weight loss. Negative for chills and fever. Respiratory: Negative for cough and shortness of breath. Cardiovascular: Negative for chest pain. Gastrointestinal: Negative for blood in stool. Genitourinary: Negative for hematuria. Musculoskeletal: Positive for back pain and joint pain. Psychiatric/Behavioral: Negative. Vitals/Objective:     Vitals:    11/22/21 1405 11/22/21 1408   BP: (!) 153/65 123/71   Pulse: 60    Resp: 22    Temp: 97.9 °F (36.6 °C)    TempSrc: Temporal    SpO2: 97%    Weight: 303 lb 12.8 oz (137.8 kg)    Height: 5' 8.5\" (1.74 m)      Body mass index is 45.52 kg/m². Wt Readings from Last 3 Encounters:   11/22/21 303 lb 12.8 oz (137.8 kg)   09/20/21 315 lb (142.9 kg)   08/18/21 329 lb 6.4 oz (149.4 kg)         Objective  Physical Exam  Vitals and nursing note reviewed. Constitutional:       Appearance: Normal appearance. She is not toxic-appearing. HENT:      Head: Normocephalic and atraumatic. Cardiovascular:      Rate and Rhythm: Normal rate and regular rhythm. Heart sounds: Normal heart sounds. No murmur heard. No gallop. Pulmonary:      Effort: Pulmonary effort is normal. No respiratory distress. Breath sounds: Normal breath sounds. No wheezing, rhonchi or rales. Musculoskeletal:      Cervical back: No muscular tenderness. Lymphadenopathy:      Cervical: No cervical adenopathy. Skin:     Comments: Small area of burns less than 1 CM lumbar back  None appearing infected. Neurological:      Mental Status: She is alert.    Psychiatric: Mood and Affect: Mood normal.         Behavior: Behavior normal.         Thought Content: Thought content normal.         Judgment: Judgment normal.            No results found for this or any previous visit (from the past 24 hour(s)). Disposition     Follow-up and Dispositions  ·   Return in about 3 months (around 2/22/2022) for Medication follow up. No future appointments. History   Patient's past medical, surgical and family histories were reviewed and updated.     Past Medical History:   Diagnosis Date    Atrial fibrillation (HCC)     Chronic renal failure     Diabetes ([facility] [address]     Hearing loss     Hypertension     Migraine     Morbid obesity (HCC)     Osteoarthritis     Sleep apnea     no CPAP    Thyroid disease      Past Surgical History:   Procedure Laterality Date    COLONOSCOPY N/A 12/4/2018    COLONOSCOPY performed by Devin Mckinnon MD at [address], normal, repeat 5 yeasr    HX DILATION AND CURETTAGE      HX HYSTERECTOMY      ME CARDIAC SURG PROCEDURE UNLIST      cardioversion 11/16/2018    ME CARDIAC SURG PROCEDURE UNLIST      ablation, 2016     Family History   Problem Relation Age of Onset    Heart Disease Mother     Hypertension Mother     Diabetes Mother      Social History     Tobacco Use    Smoking status: Former Smoker     Years: 1.00     Types: Cigarettes    Smokeless tobacco: Never Used   Substance Use Topics    Alcohol use: No    Drug use: No       Allergies     Allergies   Allergen Reactions    Ibuprofen Hives    Flowers Unable to [employer] Unable To Obtain Unable to [facility]

## 2021-12-08 ENCOUNTER — PATIENT MESSAGE (OUTPATIENT)
Dept: FAMILY MEDICINE CLINIC | Age: 66
End: 2021-12-08

## 2021-12-08 DIAGNOSIS — E03.9 ACQUIRED HYPOTHYROIDISM: ICD-10-CM

## 2021-12-08 DIAGNOSIS — I48.20 CHRONIC ATRIAL FIBRILLATION (HCC): ICD-10-CM

## 2021-12-08 DIAGNOSIS — I10 ESSENTIAL HYPERTENSION: ICD-10-CM

## 2021-12-08 DIAGNOSIS — R30.9 URINARY PAIN: ICD-10-CM

## 2021-12-10 RX ORDER — LEVOTHYROXINE SODIUM 137 UG/1
TABLET ORAL
Qty: 90 TABLET | Refills: 1 | Status: SHIPPED | OUTPATIENT
Start: 2021-12-10 | End: 2022-02-21 | Stop reason: SDUPTHER

## 2021-12-10 RX ORDER — FUROSEMIDE 40 MG/1
TABLET ORAL
Qty: 90 TABLET | Refills: 1 | Status: SHIPPED | OUTPATIENT
Start: 2021-12-10 | End: 2022-02-21 | Stop reason: SDUPTHER

## 2021-12-10 RX ORDER — ISOSORBIDE MONONITRATE 30 MG/1
TABLET, EXTENDED RELEASE ORAL
Qty: 90 TABLET | Refills: 1 | Status: SHIPPED | OUTPATIENT
Start: 2021-12-10 | End: 2022-02-21 | Stop reason: SDUPTHER

## 2021-12-10 RX ORDER — LOSARTAN POTASSIUM 100 MG/1
100 TABLET ORAL DAILY
Qty: 90 TABLET | Refills: 1 | Status: SHIPPED | OUTPATIENT
Start: 2021-12-10 | End: 2022-02-21 | Stop reason: SDUPTHER

## 2021-12-10 RX ORDER — OXYBUTYNIN CHLORIDE 5 MG/1
TABLET ORAL
Qty: 90 TABLET | Refills: 1 | Status: SHIPPED | OUTPATIENT
Start: 2021-12-10 | End: 2022-02-21 | Stop reason: SDUPTHER

## 2021-12-10 NOTE — TELEPHONE ENCOUNTER
12/10/21  9:49 AM  RX's to [facility] with rx plan change. 1. Essential hypertension    - furosemide (LASIX) 40 mg tablet; TAKE 1 TABLET DAILY  Dispense: 90 Tablet; Refill: 1  - losartan (COZAAR) 100 mg tablet; Take 1 Tablet by mouth daily. Dispense: 90 Tablet; Refill: 1    2. Chronic atrial fibrillation (HCC)  \  - isosorbide mononitrate ER (IMDUR) 30 mg tablet; TAKE 1 TABLET EVERY MORNING  Dispense: 90 Tablet; Refill: 1    3. Acquired hypothyroidism  \  - levothyroxine (Synthroid) 137 mcg tablet; TAKE 1 TABLET DAILY BEFORE BREAKFAST  Dispense: 90 Tablet; Refill: 1    4. Urinary pain  \  - oxybutynin (DITROPAN) 5 mg tablet; TAKE 1 TABLET NIGHTLY  Dispense: 90 Tablet;  Refill: 1      Bubba Tate MD

## 2022-02-21 ENCOUNTER — OFFICE VISIT (OUTPATIENT)
Dept: FAMILY MEDICINE CLINIC | Age: 67
End: 2022-02-21
Payer: MEDICARE

## 2022-02-21 VITALS
RESPIRATION RATE: 16 BRPM | HEART RATE: 61 BPM | TEMPERATURE: 97.1 F | BODY MASS INDEX: 43.4 KG/M2 | OXYGEN SATURATION: 98 % | HEIGHT: 69 IN | DIASTOLIC BLOOD PRESSURE: 75 MMHG | WEIGHT: 293 LBS | SYSTOLIC BLOOD PRESSURE: 124 MMHG

## 2022-02-21 DIAGNOSIS — I10 ESSENTIAL HYPERTENSION: ICD-10-CM

## 2022-02-21 DIAGNOSIS — E11.21 TYPE 2 DIABETES WITH NEPHROPATHY (HCC): Primary | ICD-10-CM

## 2022-02-21 DIAGNOSIS — I20.9 ANGINA PECTORIS, UNSPECIFIED (HCC): ICD-10-CM

## 2022-02-21 DIAGNOSIS — I25.119 ATHEROSCLEROSIS OF NATIVE CORONARY ARTERY OF NATIVE HEART WITH ANGINA PECTORIS (HCC): ICD-10-CM

## 2022-02-21 DIAGNOSIS — Z12.31 ENCOUNTER FOR SCREENING MAMMOGRAM FOR MALIGNANT NEOPLASM OF BREAST: ICD-10-CM

## 2022-02-21 DIAGNOSIS — R30.9 URINARY PAIN: ICD-10-CM

## 2022-02-21 DIAGNOSIS — E03.9 ACQUIRED HYPOTHYROIDISM: ICD-10-CM

## 2022-02-21 DIAGNOSIS — E78.00 HYPERCHOLESTEREMIA: ICD-10-CM

## 2022-02-21 DIAGNOSIS — E66.01 MORBID OBESITY WITH BMI OF 45.0-49.9, ADULT (HCC): ICD-10-CM

## 2022-02-21 DIAGNOSIS — I48.20 CHRONIC ATRIAL FIBRILLATION (HCC): ICD-10-CM

## 2022-02-21 PROBLEM — F11.99 OPIOID USE, UNSPECIFIED WITH UNSPECIFIED OPIOID-INDUCED DISORDER (HCC): Status: RESOLVED | Noted: 2021-11-22 | Resolved: 2022-02-21

## 2022-02-21 PROBLEM — F32.1 MODERATE MAJOR DEPRESSION (HCC): Status: RESOLVED | Noted: 2018-08-24 | Resolved: 2022-02-21

## 2022-02-21 PROCEDURE — 1101F PT FALLS ASSESS-DOCD LE1/YR: CPT | Performed by: FAMILY MEDICINE

## 2022-02-21 PROCEDURE — G8536 NO DOC ELDER MAL SCRN: HCPCS | Performed by: FAMILY MEDICINE

## 2022-02-21 PROCEDURE — G8752 SYS BP LESS 140: HCPCS | Performed by: FAMILY MEDICINE

## 2022-02-21 PROCEDURE — 99214 OFFICE O/P EST MOD 30 MIN: CPT | Performed by: FAMILY MEDICINE

## 2022-02-21 PROCEDURE — G9899 SCRN MAM PERF RSLTS DOC: HCPCS | Performed by: FAMILY MEDICINE

## 2022-02-21 PROCEDURE — 3017F COLORECTAL CA SCREEN DOC REV: CPT | Performed by: FAMILY MEDICINE

## 2022-02-21 PROCEDURE — G9717 DOC PT DX DEP/BP F/U NT REQ: HCPCS | Performed by: FAMILY MEDICINE

## 2022-02-21 PROCEDURE — G8417 CALC BMI ABV UP PARAM F/U: HCPCS | Performed by: FAMILY MEDICINE

## 2022-02-21 PROCEDURE — G8754 DIAS BP LESS 90: HCPCS | Performed by: FAMILY MEDICINE

## 2022-02-21 PROCEDURE — 3046F HEMOGLOBIN A1C LEVEL >9.0%: CPT | Performed by: FAMILY MEDICINE

## 2022-02-21 PROCEDURE — G8400 PT W/DXA NO RESULTS DOC: HCPCS | Performed by: FAMILY MEDICINE

## 2022-02-21 PROCEDURE — 2022F DILAT RTA XM EVC RTNOPTHY: CPT | Performed by: FAMILY MEDICINE

## 2022-02-21 PROCEDURE — G8427 DOCREV CUR MEDS BY ELIG CLIN: HCPCS | Performed by: FAMILY MEDICINE

## 2022-02-21 PROCEDURE — 1090F PRES/ABSN URINE INCON ASSESS: CPT | Performed by: FAMILY MEDICINE

## 2022-02-21 RX ORDER — LEVOTHYROXINE SODIUM 137 UG/1
TABLET ORAL
Qty: 90 TABLET | Refills: 1 | Status: SHIPPED | OUTPATIENT
Start: 2022-02-21 | End: 2022-08-11 | Stop reason: SDUPTHER

## 2022-02-21 RX ORDER — AMIODARONE HYDROCHLORIDE 200 MG/1
200 TABLET ORAL DAILY
Qty: 90 TABLET | Refills: 1 | Status: SHIPPED | OUTPATIENT
Start: 2022-02-21 | End: 2022-08-11 | Stop reason: SDUPTHER

## 2022-02-21 RX ORDER — LOSARTAN POTASSIUM 100 MG/1
100 TABLET ORAL DAILY
Qty: 90 TABLET | Refills: 1 | Status: SHIPPED | OUTPATIENT
Start: 2022-02-21 | End: 2022-08-11 | Stop reason: SDUPTHER

## 2022-02-21 RX ORDER — AMLODIPINE BESYLATE 10 MG/1
10 TABLET ORAL DAILY
Qty: 90 TABLET | Refills: 1 | Status: SHIPPED | OUTPATIENT
Start: 2022-02-21 | End: 2022-08-11 | Stop reason: SDUPTHER

## 2022-02-21 RX ORDER — OXYBUTYNIN CHLORIDE 5 MG/1
TABLET ORAL
Qty: 90 TABLET | Refills: 1 | Status: SHIPPED | OUTPATIENT
Start: 2022-02-21

## 2022-02-21 RX ORDER — TOPIRAMATE 50 MG/1
50 TABLET, FILM COATED ORAL
Qty: 90 TABLET | Refills: 1 | Status: SHIPPED | OUTPATIENT
Start: 2022-02-21 | End: 2022-03-21

## 2022-02-21 RX ORDER — FUROSEMIDE 40 MG/1
TABLET ORAL
Qty: 90 TABLET | Refills: 1 | Status: SHIPPED | OUTPATIENT
Start: 2022-02-21 | End: 2022-08-11

## 2022-02-21 RX ORDER — ISOSORBIDE MONONITRATE 30 MG/1
TABLET, EXTENDED RELEASE ORAL
Qty: 90 TABLET | Refills: 1 | Status: SHIPPED | OUTPATIENT
Start: 2022-02-21 | End: 2022-08-11 | Stop reason: SDUPTHER

## 2022-02-21 NOTE — PATIENT INSTRUCTIONS
Diabetes:  Blood sugar goals:  Hemoglobin A1c under 7  Fasting blood sugar   Blood sugar 2 hours after a meal under 180, 4 hours after a meal under 120  No hypoglycemia (sugars under 70 and symptomatic low sugars)    Blood sugar control with diet and exercise:  Exercise 45 minutes per day. This makes your insulin work better. It also allows insulin levels to fall helping with weight loss. Every night, try to fast from your evening meal to breakfast (at least 12 hours) without eating anything. This uses stored energy in your liver and makes insulin work better. Avoid simples sugars such as table sugar in drinks (sodas, lemonade, sweet tea, wine), desserts, candy. Also avoid fruit juices and high fructose corn syrup. Avoid frequent consumption of fruit especially grapes and bananas. A single serving of fruit daily is all you should have. Finally, drink less milk which has milk sugar in it. Control your starch intake. Men should have 3-4 carb portions per meal.  Women should have 2-3 carb portions per meal.  A carb serving is the equivalent of a slice of bread. Control your blood pressure and cholesterol. These problems are common in diabetes. AND, don't smoke. All of these problems contribute to heart disease and stroke risk. Get a yearly eye exam and flu shot. Make sure your vaccines are up to date particularly the pneumonia vaccines. Inspect your feet daily. Wear comfortable protective shoes and clean socks. Seek medical care if there are sore, calluses or numbness and burning of your feet. See your doctor at least every 6 months if you are on oral medicines or more often if the diabetic control is not at goal or if you are on insulin. Take your medicines religiously. So, what can I eat? 1) Protein-protein consumption promotes weight loss. Eat protein at every meal.  Good sources of protein include meat, fish, poultry and eggs.       2) More soluble fiber-soluble fiber helps us feel \"full\" and helps improve many markers for cardiovascular disease. But, we have to watch out for products with added sugar or large carb servings! Sources of soluble fiber include oatmeal, root vegetables such as sweet potatoes, turnips and carrots, vegetables such as broccoli and Brussel sprouts. 3) Healthy fats and oils-certain fats are better for our blood vessels and cardiovascular system. The oils in fish and seafood tend to be better for us as well as olive oil and the nuts on trees (walnuts, almonds, pistachios and hazelnuts). So, again, try to eat more fish. Use olive oil for cooking and for salad dressings. Nuts can be used in salads and in other dishes and they make a good low carb snack. 4) Non starchy vegetables-Green and yellow vegetables offer a lot of nutrients and very low amounts of carbs that can lead to weight gain. Salads can add a lot to our diet and also pack in a lot of nutrients. Cautions:  avoid starchy vegetables such as potatoes, corn and peas. Also, be careful about what is added to vegetables such as fruit or salad dressings that contain sugar. 5) Full fat dairy products-Cheeses make a good snack or appetizer. Cottage cheese can be a good basis for breakfast.  Yogurt is a good addition to our diet but watch out for yogurt that has added sugar. Avoid milk. STOP the SUGAR    The first step in dietary efforts at weight loss is removing as much sugar from the diet as possible. Most dietary sugar is in the forms of table sugar (sucrose), fruit sugar (fructose) and milk sugar (lactose). But, you need to watch labels to see if processed foods have added sugars under other names. To eliminate sucrose, eliminate sweet drinks entirely including soft drinks, sports drinks, lemonade, sweet tea and wine. Also, eliminate candy and make desserts a \"special occasion only treat\".   Don't eat desserts with every meal or every night.    Most fructose is found in fruit and this should be markedly limited. Fruit juice should be avoided. If you do eat fruit, eat fruits that are lower in sugar such as: strawberries, blackberries, raspberries, lemon, grapefruit and watermelon. Eat small portions and think of the fruit as a garnish or small treat. Bananas, mangos, cherries and grapes are higher in sugar and should be avoided. Avoid high fructose corn syrup (an artificial sweetener). Milk sugar, or lactose, should be avoided as well. Milk is a good source of calcium but not for people struggling with weight issues. Put a little cream in your coffee or tea but otherwise avoid milk. How can you avoid sugar? For starters, don't buy it and don't bring it in the house. It won't tempt you that way!     For more information:    Try the internet for videos about sugar addiction or try [email or web address].

## 2022-02-21 NOTE — PROGRESS NOTES
1. Have you been to the ER, urgent care clinic since your last visit? Hospitalized since your last visit? No    2. Have you seen or consulted any other health care providers outside of the [address] [place] [address] since your last visit? Include any pap smears or colon screening. No    Chief Complaint   Patient presents with    Diabetes    Medication Refill    Follow-up     Health Maintenance Due   Topic Date Due    Shingrix Vaccine Age 49> (2 of 2) 09/24/2019    Bone Densitometry (Dexa) Screening  Never done    COVID-19 Vaccine (2 - Pfizer 3-dose series) 04/13/2021    Breast Cancer Screen Mammogram  06/10/2021    Foot Exam Q1  08/17/2021    Lipid Screen  08/17/2021    Flu Vaccine (1) 09/01/2021    Eye Exam Retinal or Dilated  09/18/2021    Depression Monitoring  11/23/2021     3 most recent PHQ Screens 2/21/2022   Little interest or pleasure in doing things Not at all   Feeling down, depressed, irritable, or hopeless Not at all   Total Score PHQ 2 0     Abuse Screening Questionnaire 2/21/2022   Do you ever feel afraid of your partner? N   Are you in a relationship with someone who physically or mentally threatens you? N   Is it safe for you to go home?  Y     Learning Assessment 2/5/2019   PRIMARY LEARNER Patient   PRIMARY LANGUAGE ENGLISH   LEARNER PREFERENCE PRIMARY READING   ANSWERED BY patient   RELATIONSHIP SELF     Visit Vitals  /61 (BP 1 Location: Left arm, BP Patient Position: Sitting, BP Cuff Size: Adult)   Pulse 61   Temp 97.1 °F (36.2 °C) (Skin)   Resp 16   Ht 5' 8.5\" (1.74 m)   Wt 298 lb (135.2 kg)   SpO2 98%   BMI 44.65 kg/m²

## 2022-02-21 NOTE — PROGRESS NOTES
Sam Standard  79 y.o. female  1955  DYD:[id number]  [facility]  Progress Note     Encounter Date: 2/21/2022    Assessment and Plan:     Encounter Diagnoses     ICD-10-CM ICD-9-CM   1. Type 2 diabetes with nephropathy (HCC)  E11.21 250.40     583.81   2. Essential hypertension  I10 401.9   3. Acquired hypothyroidism  E03.9 244.9   4. Hypercholesteremia  E78.00 272.0   5. Chronic atrial fibrillation (HCC)  I48.20 427.31   6. Urinary pain  R30.9 788.1   7. Morbid obesity with BMI of 45.0-49.9, adult (HCC)  E66.01 278.01    Z68.42 V85.42   8. Angina pectoris, unspecified  I20.9 413.9   9. Atherosclerosis of native coronary artery of native heart with angina pectoris ([facility] [address]  I25.119 414.01     413.9   10. Encounter for screening mammogram for malignant neoplasm of breast  Z12.31 V76.12       1. Type 2 diabetes with nephropathy (HCC)  Losing weight  Given samples of Trulicity  Topamax apparently helping with appetite  - CBC WITH AUTOMATED DIFF; Future  - HEMOGLOBIN A1C WITH EAG; Future  -  DIABETES FOOT EXAM    2. Essential hypertension  At goal  - METABOLIC PANEL, BASIC; Future  - MICROALBUMIN, UR, RAND W/ MICROALB/CREAT RATIO; Future  - amLODIPine (NORVASC) 10 mg tablet; Take 1 Tablet by mouth daily. Dispense: 90 Tablet; Refill: 1  - furosemide (LASIX) 40 mg tablet; TAKE 1 TABLET DAILY  Dispense: 90 Tablet; Refill: 1  - losartan (COZAAR) 100 mg tablet; Take 1 Tablet by mouth daily. Dispense: 90 Tablet; Refill: 1    3. Acquired hypothyroidism  Check status on amiodarone  - TSH 3RD GENERATION; Future  - T4 (THYROXINE); Future  - levothyroxine (Synthroid) 137 mcg tablet; TAKE 1 TABLET DAILY BEFORE BREAKFAST  Dispense: 90 Tablet; Refill: 1    4. Hypercholesteremia  Off statin. Seems to have made substantial difference in joint pain  - HEPATIC FUNCTION PANEL; Future  - LIPID PANEL; Future    5.  Chronic atrial fibrillation (HCC)  Seeing Dr. Linda Maier  Now had pacemaker  - amiodarone (CORDARONE) 200 mg tablet; Take 1 Tablet by mouth daily. Dispense: 90 Tablet; Refill: 1  - isosorbide mononitrate ER (IMDUR) 30 mg tablet; TAKE 1 TABLET EVERY MORNING  Dispense: 90 Tablet; Refill: 1  - rivaroxaban (Xarelto) 20 mg tab tablet; TAKE 1 TABLET DAILY  Dispense: 90 Tablet; Refill: 1    6. Urinary pain  Stable on meds  - oxybutynin (DITROPAN) 5 mg tablet; TAKE 1 TABLET NIGHTLY  Dispense: 90 Tablet; Refill: 1    7. Morbid obesity with BMI of 45.0-49.9, adult ([facility] [address]  Losing weight as noted  - topiramate (TOPAMAX) 50 mg tablet; Take 1 Tablet by mouth nightly. Dispense: 90 Tablet; Refill: 1    8. Angina pectoris, unspecified  Pain free    9. Atherosclerosis of native coronary artery of native heart with angina pectoris ([facility]  As above    10. Encounter for screening mammogram for malignant neoplasm of breast  Screening.  - [facility] MAMMO BI SCREENING INCL CAD; Future      I have discussed the diagnosis with the patient and the intended plan as seen in the above orders. she has expressed understanding. The patient has received an after-visit summary and questions were answered concerning future plans. I have discussed medication side effects and warnings with the patient as well. Electronically Signed: Ludin Rider MD    Current Medications after this visit     Current Outpatient Medications   Medication Sig    amLODIPine (NORVASC) 10 mg tablet Take 1 Tablet by mouth daily.  amiodarone (CORDARONE) 200 mg tablet Take 1 Tablet by mouth daily.  furosemide (LASIX) 40 mg tablet TAKE 1 TABLET DAILY    isosorbide mononitrate ER (IMDUR) 30 mg tablet TAKE 1 TABLET EVERY MORNING    levothyroxine (Synthroid) 137 mcg tablet TAKE 1 TABLET DAILY BEFORE BREAKFAST    losartan (COZAAR) 100 mg tablet Take 1 Tablet by mouth daily.  oxybutynin (DITROPAN) 5 mg tablet TAKE 1 TABLET NIGHTLY    topiramate (TOPAMAX) 50 mg tablet Take 1 Tablet by mouth nightly.     rivaroxaban (Xarelto) 20 mg tab tablet TAKE 1 TABLET DAILY    traMADoL (ULTRAM) 50 mg tablet Take 1 Tablet by mouth every eight (8) hours as needed for Pain for up to 30 days. Max Daily Amount: 150 mg.    dulaglutide (TRULICITY) 1.5 SN/5.2 mL sub-q pen 1.5 mg by SubCUTAneous route every seven (7) days.  acetaminophen (TYLENOL EXTRA STRENGTH PO) Take  by mouth. No current facility-administered medications for this visit. Medications Discontinued During This Encounter   Medication Reason    atorvastatin (LIPITOR) 10 mg tablet Not A Current Medication    amLODIPine (NORVASC) 10 mg tablet REORDER    amiodarone (CORDARONE) 200 mg tablet REORDER    topiramate (TOPAMAX) 50 mg tablet REORDER    rivaroxaban (Xarelto) 20 mg tab tablet REORDER    furosemide (LASIX) 40 mg tablet REORDER    isosorbide mononitrate ER (IMDUR) 30 mg tablet REORDER    levothyroxine (Synthroid) 137 mcg tablet REORDER    losartan (COZAAR) 100 mg tablet REORDER    oxybutynin (DITROPAN) 5 mg tablet REORDER     ~~~~~~~~~~~~~~~~~~~~~~~~~~~~~~~~~~~~~~~~~~~~~~~~~~~~~~~~~~~    Chief Complaint   Patient presents with    Diabetes    Medication Refill    Follow-up       History provided by patient  History of Present Illness   Trevor Stiles is a 79 y.o. female who presents to clinic today for:  Diabetes, Medication Refill, and Follow-up    Problems with dentist.  Broken tooth, Needs oral surgery    DM2 continuing weight loss. Sticking to diet. Trulicity only. Ran out and has not had for a month. Topamax has helped with appetite control. Needs eye exam  Feet are OK. Joints improved off of atorvastatin. Remains on xarelto. BP remains well controlled.       Health Maintenance  Will do at future visit  Health Maintenance Due   Topic Date Due    Shingrix Vaccine Age 49> (2 of 2) 09/24/2019    Bone Densitometry (Dexa) Screening  Never done    COVID-19 Vaccine (2 - Pfizer 3-dose series) 04/13/2021    Breast Cancer Screen Mammogram  06/10/2021    Lipid Screen 08/17/2021    Flu Vaccine (1) 09/01/2021    Eye Exam Retinal or Dilated  09/18/2021    Depression Screen  11/23/2021     Review of Systems   Review of Systems   Respiratory: Negative for shortness of breath. Cardiovascular: Negative for chest pain and leg swelling. Gastrointestinal: Negative for blood in stool. Genitourinary: Negative for hematuria. Musculoskeletal: Positive for joint pain. Psychiatric/Behavioral: Negative. Vitals/Objective:     Vitals:    02/21/22 1416   BP: 129/61   Pulse: 61   Resp: 16   Temp: 97.1 °F (36.2 °C)   TempSrc: Skin   SpO2: 98%   Weight: 298 lb (135.2 kg)   Height: 5' 8.5\" (1.74 m)     Body mass index is 44.65 kg/m². Wt Readings from Last 3 Encounters:   02/21/22 298 lb (135.2 kg)   11/22/21 303 lb 12.8 oz (137.8 kg)   09/20/21 315 lb (142.9 kg)         Objective  Physical Exam  Vitals and nursing note reviewed. Constitutional:       Appearance: Normal appearance. She is obese. She is not toxic-appearing. Comments: Walking with cane. HENT:      Head: Normocephalic and atraumatic. Cardiovascular:      Rate and Rhythm: Normal rate and regular rhythm. Heart sounds: Normal heart sounds. No murmur heard. No gallop. Comments: Pacer noted. Pulmonary:      Effort: Pulmonary effort is normal. No respiratory distress. Breath sounds: Normal breath sounds. No wheezing, rhonchi or rales. Musculoskeletal:      Cervical back: No muscular tenderness. Lymphadenopathy:      Cervical: No cervical adenopathy. Neurological:      Mental Status: She is alert. Psychiatric:         Mood and Affect: Mood normal.         Behavior: Behavior normal.         Thought Content: Thought content normal.         Judgment: Judgment normal.          Diabetic Foot Exam:  Protective sensation is intact bilaterally. Pedal pulses are 2+ and normal bilaterally.   L foot skin inspection:  normal skin and soft tissue with no gross edema or evidence of acute injury or foot ulcer  R foot skin inspection:  skin and soft tissue appear normal with no significant edema or evidence of acute injury or foot ulcer     No results found for this or any previous visit (from the past 24 hour(s)). Disposition     Follow-up and Dispositions  ·   Return in about 6 months (around 8/21/2022) for Blood pressure follow up, Diabetes follow up, Medication follow up. No future appointments. History   Patient's past medical, surgical and family histories were reviewed and updated.     Past Medical History:   Diagnosis Date    Atrial fibrillation (HCC)     Chronic renal failure     Diabetes ([address]     Hearing loss     Hypertension     Migraine     Morbid obesity (HCC)     Osteoarthritis     Sleep apnea     no CPAP    Thyroid disease      Past Surgical History:   Procedure Laterality Date    COLONOSCOPY N/A 12/4/2018    COLONOSCOPY performed by [facility]ana Pham [facility] at [facility], normal, repeat 5 yeasr    HX DILATION AND CURETTAGE      HX HYSTERECTOMY      IA CARDIAC SURG PROCEDURE UNLIST      cardioversion 11/16/2018    IA CARDIAC SURG PROCEDURE UNLIST      ablation, 2016     Family History   Problem Relation Age of Onset    Heart Disease Mother     Hypertension Mother     Diabetes Mother      Social History     Tobacco Use    Smoking status: Former Smoker     Years: 1.00     Types: Cigarettes    Smokeless tobacco: Never Used   Substance Use Topics    Alcohol use: No    Drug use: No       Allergies     Allergies   Allergen Reactions    Ibuprofen Hives    Flowers Unable to [employer] Unable To Obtain Unable to [facility]

## 2022-02-22 ENCOUNTER — PATIENT MESSAGE (OUTPATIENT)
Dept: FAMILY MEDICINE CLINIC | Age: 67
End: 2022-02-22

## 2022-02-22 DIAGNOSIS — E11.21 TYPE 2 DIABETES WITH NEPHROPATHY (HCC): Primary | ICD-10-CM

## 2022-02-22 LAB
ALBUMIN SERPL-MCNC: 3.8 G/DL (ref 3.5–5)
ALBUMIN/GLOB SERPL: 1 {RATIO} (ref 1.1–2.2)
ALP SERPL-CCNC: 101 U/L (ref 45–117)
ALT SERPL-CCNC: 16 U/L (ref 12–78)
ANION GAP SERPL CALC-SCNC: 8 MMOL/L (ref 5–15)
AST SERPL-CCNC: 15 U/L (ref 15–37)
BASOPHILS # BLD: 0.1 K/UL (ref 0–0.1)
BASOPHILS NFR BLD: 1 % (ref 0–1)
BILIRUB DIRECT SERPL-MCNC: 0.2 MG/DL (ref 0–0.2)
BILIRUB SERPL-MCNC: 0.8 MG/DL (ref 0.2–1)
BUN SERPL-MCNC: 12 MG/DL (ref 6–20)
BUN/CREAT SERPL: 11 (ref 12–20)
CALCIUM SERPL-MCNC: 9.6 MG/DL (ref 8.5–10.1)
CHLORIDE SERPL-SCNC: 109 MMOL/L (ref 97–108)
CHOLEST SERPL-MCNC: 273 MG/DL
CO2 SERPL-SCNC: 22 MMOL/L (ref 21–32)
CREAT SERPL-MCNC: 1.11 MG/DL (ref 0.55–1.02)
CREAT UR-MCNC: 329 MG/DL
DIFFERENTIAL METHOD BLD: ABNORMAL
EOSINOPHIL # BLD: 0.2 K/UL (ref 0–0.4)
EOSINOPHIL NFR BLD: 4 % (ref 0–7)
ERYTHROCYTE [DISTWIDTH] IN BLOOD BY AUTOMATED COUNT: 14.7 % (ref 11.5–14.5)
EST. AVERAGE GLUCOSE BLD GHB EST-MCNC: 111 MG/DL
GLOBULIN SER CALC-MCNC: 3.7 G/DL (ref 2–4)
GLUCOSE SERPL-MCNC: 106 MG/DL (ref 65–100)
HBA1C MFR BLD: 5.5 % (ref 4–5.6)
HCT VFR BLD AUTO: 46.3 % (ref 35–47)
HDLC SERPL-MCNC: 55 MG/DL
HDLC SERPL: 5 {RATIO} (ref 0–5)
HGB BLD-MCNC: 13.9 G/DL (ref 11.5–16)
IMM GRANULOCYTES # BLD AUTO: 0 K/UL (ref 0–0.04)
IMM GRANULOCYTES NFR BLD AUTO: 0 % (ref 0–0.5)
LDLC SERPL CALC-MCNC: 190.2 MG/DL (ref 0–100)
LYMPHOCYTES # BLD: 1.5 K/UL (ref 0.8–3.5)
LYMPHOCYTES NFR BLD: 24 % (ref 12–49)
MCH RBC QN AUTO: 28.1 PG (ref 26–34)
MCHC RBC AUTO-ENTMCNC: 30 G/DL (ref 30–36.5)
MCV RBC AUTO: 93.5 FL (ref 80–99)
MICROALBUMIN UR-MCNC: 13 MG/DL
MICROALBUMIN/CREAT UR-RTO: 40 MG/G (ref 0–30)
MONOCYTES # BLD: 0.5 K/UL (ref 0–1)
MONOCYTES NFR BLD: 8 % (ref 5–13)
NEUTS SEG # BLD: 4 K/UL (ref 1.8–8)
NEUTS SEG NFR BLD: 63 % (ref 32–75)
NRBC # BLD: 0 K/UL (ref 0–0.01)
NRBC BLD-RTO: 0 PER 100 WBC
PLATELET # BLD AUTO: 280 K/UL (ref 150–400)
PMV BLD AUTO: 11.4 FL (ref 8.9–12.9)
POTASSIUM SERPL-SCNC: 4.1 MMOL/L (ref 3.5–5.1)
PROT SERPL-MCNC: 7.5 G/DL (ref 6.4–8.2)
RBC # BLD AUTO: 4.95 M/UL (ref 3.8–5.2)
SODIUM SERPL-SCNC: 139 MMOL/L (ref 136–145)
T4 SERPL-MCNC: 19.4 UG/DL (ref 4.8–13.9)
TRIGL SERPL-MCNC: 139 MG/DL (ref ?–150)
TSH SERPL DL<=0.05 MIU/L-ACNC: 0.37 UIU/ML (ref 0.36–3.74)
VLDLC SERPL CALC-MCNC: 27.8 MG/DL
WBC # BLD AUTO: 6.3 K/UL (ref 3.6–11)

## 2022-02-22 NOTE — PROGRESS NOTES
Print a copy of these labs to show Dr. Abram Shannon. There may be a thyroid issue developing. I would recommend repeating the labs in 6-8 weeks. The blood sugar control is excellent. Stick to the diet and the Trulicity.   [facility]

## 2022-02-25 ENCOUNTER — TELEPHONE (OUTPATIENT)
Dept: FAMILY MEDICINE CLINIC | Age: 67
End: 2022-02-25

## 2022-03-09 ENCOUNTER — VIRTUAL VISIT (OUTPATIENT)
Dept: FAMILY MEDICINE CLINIC | Age: 67
End: 2022-03-09
Payer: MEDICARE

## 2022-03-09 DIAGNOSIS — U07.1 COVID-19: Primary | ICD-10-CM

## 2022-03-09 PROCEDURE — 99442 PR PHYS/QHP TELEPHONE EVALUATION 11-20 MIN: CPT | Performed by: FAMILY MEDICINE

## 2022-03-09 RX ORDER — METHYLPREDNISOLONE 4 MG/1
TABLET ORAL
Qty: 1 DOSE PACK | Refills: 0 | Status: SHIPPED | OUTPATIENT
Start: 2022-03-09 | End: 2022-04-06

## 2022-03-09 RX ORDER — DOXYCYCLINE 100 MG/1
100 CAPSULE ORAL 2 TIMES DAILY
Qty: 20 CAPSULE | Refills: 0 | Status: SHIPPED | OUTPATIENT
Start: 2022-03-09 | End: 2022-03-19

## 2022-03-09 RX ORDER — DOXYCYCLINE 100 MG/1
100 CAPSULE ORAL 2 TIMES DAILY
Qty: 20 CAPSULE | Refills: 0 | Status: SHIPPED | OUTPATIENT
Start: 2022-03-09 | End: 2022-03-09 | Stop reason: SDUPTHER

## 2022-03-09 RX ORDER — METHYLPREDNISOLONE 4 MG/1
TABLET ORAL
Qty: 1 DOSE PACK | Refills: 0 | Status: SHIPPED | OUTPATIENT
Start: 2022-03-09 | End: 2022-03-09 | Stop reason: SDUPTHER

## 2022-03-09 NOTE — PROGRESS NOTES
Suleman Graham is a 79 y.o. female who was seen by synchronous (real-time) audio-video technology on 3/9/2022. Consent: Suleman Graham, who was seen by synchronous (real-time) audio-video technology, and/or her healthcare decision maker, is aware that this patient-initiated, Telehealth encounter on 3/9/2022 is a billable service, with coverage as determined by her insurance carrier. She is aware that she may receive a bill and has provided verbal consent to proceed: Yes. Assessment & Plan:   Diagnoses and all orders for this visit:    1. COVID-19  -     methylPREDNISolone (MEDROL DOSEPACK) 4 mg tablet; Take in declining dose as directed  -     doxycycline (VIBRAMYCIN) 100 mg capsule; Take 1 Capsule by mouth two (2) times a day for 10 days. Rx Ivermectin 16 mg daily for 5 day to [facility]  Vitamin D3, zinc and C + mouthwash  To ER if worsening status. FU by phone 4-5 days sooner if worse    Follow-up and Dispositions    · Return in about 5 days (around 3/14/2022) for Medication follow up. I spent at least 23 minutes on this visit with this established patient. ([id number])    Subjective: Suleman Graham is a 79 y.o. female who was seen for Other ( Tested positive COVID  2/28/22 home test ) and Breathing Problem    COVID  + by home test 2/28  Starting to get better  Cough and nausea with deep breath still  No fever past 5 days  Had diarrhea for 3 days now gone  Taste and smell ok  Hurts with a deep breath. Had Vaccine X 2, no booster. Mild dyspnea      Prior to Admission medications    Medication Sig Start Date End Date Taking? Authorizing Provider   methylPREDNISolone (MEDROL DOSEPACK) 4 mg tablet Take in declining dose as directed 3/9/22  Yes Nathan Morales MD   doxycycline (VIBRAMYCIN) 100 mg capsule Take 1 Capsule by mouth two (2) times a day for 10 days.  3/9/22 3/19/22 Yes Nathan Morales MD   dulaglutide (TRULICITY) 1.5 UT/5.8 mL sub-q pen 0.5 mL by SubCUTAneous route every seven (7) days. 2/22/22   Wojciech Valdovinos MD   amLODIPine (NORVASC) 10 mg tablet Take 1 Tablet by mouth daily. 2/21/22   Wojciech Valdovinos MD   amiodarone (CORDARONE) 200 mg tablet Take 1 Tablet by mouth daily. 2/21/22   Wojciech Valdovinos MD   furosemide (LASIX) 40 mg tablet TAKE 1 TABLET DAILY 2/21/22   Wojciech Valdovinos MD   isosorbide mononitrate ER (IMDUR) 30 mg tablet TAKE 1 TABLET EVERY MORNING 2/21/22   Wojciech Valdovinos MD   levothyroxine (Synthroid) 137 mcg tablet TAKE 1 TABLET DAILY BEFORE BREAKFAST 2/21/22   Wojciech Valdovinos MD   losartan (COZAAR) 100 mg tablet Take 1 Tablet by mouth daily. 2/21/22   Wojciech Valdovinos MD   oxybutynin (DITROPAN) 5 mg tablet TAKE 1 TABLET NIGHTLY 2/21/22   Wojciech Valdovinos MD   topiramate (TOPAMAX) 50 mg tablet Take 1 Tablet by mouth nightly. 2/21/22   Wojciech Valdovinos MD   rivaroxaban (Xarelto) 20 mg tab tablet TAKE 1 TABLET DAILY 2/21/22   Wojciech Valdovinos MD   acetaminophen (TYLENOL EXTRA STRENGTH PO) Take  by mouth. Provider, Historical     Allergies   Allergen Reactions    Ibuprofen Hives    Flowers Unable to Obtain    Unable To Obtain Unable to Obtain           Review of Systems   Constitutional: Negative for chills and fever. Respiratory: Positive for cough, sputum production and shortness of breath. Cardiovascular: Negative for chest pain. Psychiatric/Behavioral: Negative. Objective:   Vital Signs: (As obtained by patient/caregiver at home)  There were no vitals taken for this visit.      [INSTRUCTIONS:  \"[x]\" Indicates a positive item  \"[]\" Indicates a negative item  -- DELETE ALL ITEMS NOT EXAMINED]    Constitutional: [x] Appears well-developed and well-nourished [x] No apparent distress      [] Abnormal -     Mental status: [x] Alert and awake  [x] Oriented to person/place/time [x] Able to follow commands    [] Abnormal -     Eyes:   EOM    [x]  Normal    [] Abnormal -   Sclera  [x]  Normal    [] Abnormal - Discharge [x]  None visible   [] Abnormal -     HENT: [x] Normocephalic, atraumatic  [] Abnormal -   [x] Mouth/Throat: Mucous membranes are moist    External Ears [x] Normal  [] Abnormal -    Neck: [x] No visualized mass [] Abnormal -     Pulmonary/Chest: [x] Respiratory effort normal   [x] No visualized signs of difficulty breathing or respiratory distress        [] Abnormal -      Musculoskeletal:   [x] Normal gait with no signs of ataxia         [x] Normal range of motion of neck        [] Abnormal -     Neurological:        [x] No Facial Asymmetry (Cranial nerve 7 motor function) (limited exam due to video visit)          [x] No gaze palsy        [] Abnormal -          Skin:        [x] No significant exanthematous lesions or discoloration noted on facial skin         [] Abnormal -            Psychiatric:       [x] Normal Affect [] Abnormal -        [x] No Hallucinations    Other pertinent observable physical exam findings:-        We discussed the expected course, resolution and complications of the diagnosis(es) in detail. Medication risks, benefits, costs, interactions, and alternatives were discussed as indicated. I advised her to contact the office if her condition worsens, changes or fails to improve as anticipated. She expressed understanding with the diagnosis(es) and plan. Davidcindy Gutierrez is a 79 y.o. female who was evaluated by a video visit encounter for concerns as above. Patient identification was verified prior to start of the visit. A caregiver was present when appropriate. Due to this being a TeleHealth encounter (During [facility] public health emergency), evaluation of the following organ systems was limited: Vitals/Constitutional/EENT/Resp/CV/GI//MS/Neuro/Skin/Heme-Lymph-Imm.   Pursuant to the emergency declaration under the [facility], [address] [place] [address] authority and the [employer] Act, this Virtual  Visit was conducted, with patient's (and/or legal guardian's) consent, to reduce the patient's risk of exposure to COVID-19 and provide necessary medical care. Services were provided through a video synchronous discussion virtually to substitute for in-person clinic visit. Patient was at home and I was in office for this video visit. Emanuel Stevens.  Maxi Cruz MD

## 2022-03-09 NOTE — PROGRESS NOTES
Hero Calabrese is a 79 y.o. female      Chief Complaint   Patient presents with    Other      Tested positive COVID  2/28/22 home test     Breathing Problem         1. Have you been to the ER, urgent care clinic since your last visit? Hospitalized since your last visit? No       2. Have you seen or consulted any other health care providers outside of the [address] [place] [address] since your last visit? Include any pap smears or colon screening.   No

## 2022-03-11 ENCOUNTER — TELEPHONE (OUTPATIENT)
Dept: FAMILY MEDICINE CLINIC | Age: 67
End: 2022-03-11

## 2022-03-15 NOTE — TELEPHONE ENCOUNTER
Called RE COVID  Doing better. No further fever or chills. A little cough but that is improving. No problems with meds  FU if fails to gradually improve.   [facility]

## 2022-03-18 PROBLEM — E11.21 TYPE 2 DIABETES WITH NEPHROPATHY (HCC): Status: ACTIVE | Noted: 2019-10-21

## 2022-03-18 PROBLEM — I20.9 ANGINA PECTORIS, UNSPECIFIED (HCC): Status: ACTIVE | Noted: 2022-02-21

## 2022-03-18 PROBLEM — I25.119 ATHEROSCLEROTIC HEART DISEASE OF NATIVE CORONARY ARTERY WITH UNSPECIFIED ANGINA PECTORIS (HCC): Status: ACTIVE | Noted: 2022-02-21

## 2022-03-18 PROBLEM — E66.01 MORBID OBESITY (HCC): Status: ACTIVE | Noted: 2020-03-11

## 2022-03-18 PROBLEM — M54.2 NECK PAIN: Status: ACTIVE | Noted: 2018-08-24

## 2022-03-19 PROBLEM — M65.30 ACQUIRED TRIGGER FINGER: Status: ACTIVE | Noted: 2018-08-24

## 2022-03-19 PROBLEM — E11.9 TYPE 2 DIABETES MELLITUS (HCC): Status: ACTIVE | Noted: 2018-08-24

## 2022-03-19 PROBLEM — E78.00 HYPERCHOLESTEROLEMIA: Status: ACTIVE | Noted: 2018-08-24

## 2022-03-19 PROBLEM — R06.00 DYSPNEA: Status: ACTIVE | Noted: 2020-03-11

## 2022-03-19 PROBLEM — I48.91 ATRIAL FIBRILLATION (HCC): Status: ACTIVE | Noted: 2018-08-24

## 2022-03-19 PROBLEM — N18.2 CHRONIC KIDNEY DISEASE (CKD), STAGE II (MILD): Status: ACTIVE | Noted: 2018-08-24

## 2022-03-19 PROBLEM — I10 ESSENTIAL HYPERTENSION: Status: ACTIVE | Noted: 2018-08-24

## 2022-03-19 PROBLEM — G43.109 CLASSIC MIGRAINE: Status: ACTIVE | Noted: 2018-08-24

## 2022-03-19 PROBLEM — M25.50 DIFFUSE ARTHRALGIA: Status: ACTIVE | Noted: 2018-08-24

## 2022-03-20 PROBLEM — E03.9 ACQUIRED HYPOTHYROIDISM: Status: ACTIVE | Noted: 2018-08-24

## 2022-03-20 PROBLEM — M25.50 JOINT PAIN: Status: ACTIVE | Noted: 2018-08-24

## 2022-04-06 ENCOUNTER — OFFICE VISIT (OUTPATIENT)
Dept: FAMILY MEDICINE CLINIC | Age: 67
End: 2022-04-06
Payer: MEDICARE

## 2022-04-06 VITALS
TEMPERATURE: 98.2 F | BODY MASS INDEX: 43.4 KG/M2 | OXYGEN SATURATION: 97 % | WEIGHT: 293 LBS | DIASTOLIC BLOOD PRESSURE: 65 MMHG | RESPIRATION RATE: 22 BRPM | HEIGHT: 69 IN | SYSTOLIC BLOOD PRESSURE: 137 MMHG | HEART RATE: 63 BPM

## 2022-04-06 DIAGNOSIS — K59.00 CONSTIPATION, UNSPECIFIED CONSTIPATION TYPE: ICD-10-CM

## 2022-04-06 DIAGNOSIS — R05.9 COUGH: Primary | ICD-10-CM

## 2022-04-06 PROCEDURE — G8400 PT W/DXA NO RESULTS DOC: HCPCS | Performed by: FAMILY MEDICINE

## 2022-04-06 PROCEDURE — 3017F COLORECTAL CA SCREEN DOC REV: CPT | Performed by: FAMILY MEDICINE

## 2022-04-06 PROCEDURE — G8510 SCR DEP NEG, NO PLAN REQD: HCPCS | Performed by: FAMILY MEDICINE

## 2022-04-06 PROCEDURE — 99214 OFFICE O/P EST MOD 30 MIN: CPT | Performed by: FAMILY MEDICINE

## 2022-04-06 PROCEDURE — G8427 DOCREV CUR MEDS BY ELIG CLIN: HCPCS | Performed by: FAMILY MEDICINE

## 2022-04-06 PROCEDURE — G8536 NO DOC ELDER MAL SCRN: HCPCS | Performed by: FAMILY MEDICINE

## 2022-04-06 PROCEDURE — G9899 SCRN MAM PERF RSLTS DOC: HCPCS | Performed by: FAMILY MEDICINE

## 2022-04-06 PROCEDURE — G8754 DIAS BP LESS 90: HCPCS | Performed by: FAMILY MEDICINE

## 2022-04-06 PROCEDURE — 1090F PRES/ABSN URINE INCON ASSESS: CPT | Performed by: FAMILY MEDICINE

## 2022-04-06 PROCEDURE — 1101F PT FALLS ASSESS-DOCD LE1/YR: CPT | Performed by: FAMILY MEDICINE

## 2022-04-06 PROCEDURE — G8417 CALC BMI ABV UP PARAM F/U: HCPCS | Performed by: FAMILY MEDICINE

## 2022-04-06 PROCEDURE — G8752 SYS BP LESS 140: HCPCS | Performed by: FAMILY MEDICINE

## 2022-04-06 RX ORDER — POLYETHYLENE GLYCOL 3350 17 G/17G
17 POWDER, FOR SOLUTION ORAL DAILY
Qty: 1 EACH | Refills: 3 | Status: SHIPPED | OUTPATIENT
Start: 2022-04-06

## 2022-04-06 RX ORDER — OMEPRAZOLE 40 MG/1
40 CAPSULE, DELAYED RELEASE ORAL DAILY
Qty: 30 CAPSULE | Refills: 2 | Status: SHIPPED | OUTPATIENT
Start: 2022-04-06 | End: 2022-08-11

## 2022-04-06 NOTE — PROGRESS NOTES
Gerald Zaman  79 y.o. female  1955  [id number]  [facility] FAMILY MEDICINE  Progress Note     Encounter Date: 4/6/2022    Assessment and Plan:     Encounter Diagnoses     ICD-10-CM ICD-9-CM   1. Cough  R05.9 786.2   2. Constipation, unspecified constipation type  K59.00 564.00       1. Cough  Post COVID  Normal exam  May have reflux  Trial of treating reflux, check CXR  FU 3-4 weeks  - omeprazole (PRILOSEC) 40 mg capsule; Take 1 Capsule by mouth daily. Dispense: 30 Capsule; Refill: 2  - XR CHEST PA LAT; Future    2. Constipation, unspecified constipation type  May contribute to reflux  - polyethylene glycol (MIRALAX) 17 gram packet; Take 1 Packet by mouth daily. Dispense: 1 Each; Refill: 3    OK to try CBD products for her joint pain. I have discussed the diagnosis with the patient and the intended plan as seen in the above orders. she has expressed understanding. The patient has received an after-visit summary and questions were answered concerning future plans. I have discussed medication side effects and warnings with the patient as well. Electronically Signed: Mychal Villasenor MD    Current Medications after this visit     Current Outpatient Medications   Medication Sig    polyethylene glycol (MIRALAX) 17 gram packet Take 1 Packet by mouth daily.  omeprazole (PRILOSEC) 40 mg capsule Take 1 Capsule by mouth daily.  topiramate (TOPAMAX) 50 mg tablet Take 1 tablet by mouth nightly    dulaglutide (TRULICITY) 1.5 QP/7.0 mL sub-q pen 0.5 mL by SubCUTAneous route every seven (7) days.  amLODIPine (NORVASC) 10 mg tablet Take 1 Tablet by mouth daily.  amiodarone (CORDARONE) 200 mg tablet Take 1 Tablet by mouth daily.     furosemide (LASIX) 40 mg tablet TAKE 1 TABLET DAILY    isosorbide mononitrate ER (IMDUR) 30 mg tablet TAKE 1 TABLET EVERY MORNING    levothyroxine (Synthroid) 137 mcg tablet TAKE 1 TABLET DAILY BEFORE BREAKFAST    losartan (COZAAR) 100 mg tablet Take 1 Tablet by mouth daily.  oxybutynin (DITROPAN) 5 mg tablet TAKE 1 TABLET NIGHTLY    rivaroxaban (Xarelto) 20 mg tab tablet TAKE 1 TABLET DAILY    acetaminophen (TYLENOL EXTRA STRENGTH PO) Take  by mouth. No current facility-administered medications for this visit. Medications Discontinued During This Encounter   Medication Reason    methylPREDNISolone (MEDROL DOSEPACK) 4 mg tablet Not A Current Medication     ~~~~~~~~~~~~~~~~~~~~~~~~~~~~~~~~~~~~~~~~~~~~~~~~~~~~~~~~~~~    Chief Complaint   Patient presents with    Cough     Post Covid cough had covid last of February     Constipation    Leg Pain     Bilateral leg pain from a fall back last year. History provided by patient  History of Present Illness   Lanice Boxer is a 79 y.o. female who presents to clinic today for:  Cough (Post Covid cough had covid last of February ), Constipation, and Leg Pain (Bilateral leg pain from a fall back last year.)    COVID  End of February. Cough persists  Coughs in AM when she first wakes up  Rattle in her chest.  Nothing comes up, then rattle stops  After supper in evening coughs again, gradually increases during evening and coughs during the night some  Never has sputum  No wheezing. Always has some dyspnea with heart issues  That is unchanged. No fever or chillls. Did not have chest xray during COVID    Never lost taste and smell. Thinking seems OK to her. Constipation  Tries to go  Small amounts of hard stool. Health Maintenance  Will do at future visit To reschedule mammogram.  Had to cancel due to Roxanne. Health Maintenance Due   Topic Date Due    Shingrix Vaccine Age 49> (2 of 2) 09/24/2019    Bone Densitometry (Dexa) Screening  Never done    COVID-19 Vaccine (2 - Pfizer 3-dose series) 04/13/2021    Breast Cancer Screen Mammogram  06/10/2021    Eye Exam Retinal or Dilated  09/18/2021     Review of Systems   Review of Systems   Constitutional: Negative for chills and fever.    HENT: Negative for congestion, sinus pain and sore throat. Respiratory: Positive for cough and shortness of breath. Negative for hemoptysis, sputum production and wheezing. Cardiovascular: Negative for chest pain and leg swelling. Gastrointestinal: Positive for constipation. Negative for abdominal pain and blood in stool. Genitourinary: Negative for hematuria. Musculoskeletal: Positive for joint pain. Psychiatric/Behavioral: Negative. Vitals/Objective:     Vitals:    04/06/22 1113   BP: 137/65   Pulse: 63   Resp: 22   Temp: 98.2 °F (36.8 °C)   TempSrc: Temporal   SpO2: 97%   Weight: 296 lb 12.8 oz (134.6 kg)   Height: 5' 8.5\" (1.74 m)     Body mass index is 44.47 kg/m². Wt Readings from Last 3 Encounters:   04/06/22 296 lb 12.8 oz (134.6 kg)   02/21/22 298 lb (135.2 kg)   11/22/21 303 lb 12.8 oz (137.8 kg)         Objective  Physical Exam  Constitutional:       General: She is not in acute distress. Appearance: She is well-developed. She is obese. She is not diaphoretic. HENT:      Head: Normocephalic. Right Ear: External ear normal.      Left Ear: External ear normal.      Nose: Nose normal.      Mouth/Throat:      Mouth: Mucous membranes are moist.      Pharynx: No oropharyngeal exudate or posterior oropharyngeal erythema. Eyes:      General:         Right eye: No discharge. Left eye: No discharge. Conjunctiva/sclera: Conjunctivae normal.   Neck:      Thyroid: No thyromegaly. Cardiovascular:      Rate and Rhythm: Normal rate and regular rhythm. Heart sounds: Normal heart sounds. No murmur heard. No friction rub. No gallop. Pulmonary:      Effort: Pulmonary effort is normal. No respiratory distress. Breath sounds: Normal breath sounds. No wheezing or rales. Musculoskeletal:      Cervical back: Neck supple. Lymphadenopathy:      Cervical: No cervical adenopathy. Neurological:      Mental Status: She is alert and oriented to person, place, and time. Psychiatric:         Behavior: Behavior normal.         Thought Content: Thought content normal.         Judgment: Judgment normal.           No results found for this or any previous visit (from the past 24 hour(s)). Disposition     Follow-up and Dispositions  ·   Return in about 1 month (around 5/6/2022) for Review test results, Medication follow up. No future appointments. History   Patient's past medical, surgical and family histories were reviewed and updated.     Past Medical History:   Diagnosis Date    Atrial fibrillation (HCC)     Chronic renal failure     Diabetes ([facility] [address]     Hearing loss     Hypertension     Migraine     Morbid obesity (HCC)     Osteoarthritis     Sleep apnea     no CPAP    Thyroid disease      Past Surgical History:   Procedure Laterality Date    COLONOSCOPY N/A 12/4/2018    COLONOSCOPY performed by Sulema Kahn [facility] at [facility], normal, repeat 5 yeasr    HX DILATION AND CURETTAGE      HX HYSTERECTOMY      [place] CARDIAC SURG PROCEDURE UNLIST      cardioversion 11/16/2018    [place] CARDIAC SURG PROCEDURE UNLIST      ablation, 2016     Family History   Problem Relation Age of Onset    Heart Disease Mother     Hypertension Mother     Diabetes Mother      Social History     Tobacco Use    Smoking status: Former Smoker     Years: 1.00     Types: Cigarettes    Smokeless tobacco: Never Used   Substance Use Topics    Alcohol use: No    Drug use: No       Allergies     Allergies   Allergen Reactions    Ibuprofen Hives    Flowers Unable to [employer] Unable To Obtain Unable to [facility]

## 2022-04-06 NOTE — PROGRESS NOTES
Genna Gilmore is a 79 y.o. female      Chief Complaint   Patient presents with    Cough     Post Covid cough had covid last of February     Constipation    Leg Pain     Bilateral leg pain from a fall back last year. 1. Have you been to the ER, urgent care clinic since your last visit? Hospitalized since your last visit? No     2. Have you seen or consulted any other health care providers outside of the [address] [place] [address] since your last visit? Include any pap smears or colon screening.    No

## 2022-05-06 ENCOUNTER — OFFICE VISIT (OUTPATIENT)
Dept: FAMILY MEDICINE CLINIC | Age: 67
End: 2022-05-06
Payer: MEDICARE

## 2022-05-06 VITALS
RESPIRATION RATE: 18 BRPM | HEIGHT: 69 IN | BODY MASS INDEX: 43.4 KG/M2 | OXYGEN SATURATION: 97 % | HEART RATE: 60 BPM | TEMPERATURE: 97 F | DIASTOLIC BLOOD PRESSURE: 69 MMHG | WEIGHT: 293 LBS | SYSTOLIC BLOOD PRESSURE: 134 MMHG

## 2022-05-06 DIAGNOSIS — M19.90 OSTEOARTHRITIS, UNSPECIFIED OSTEOARTHRITIS TYPE, UNSPECIFIED SITE: ICD-10-CM

## 2022-05-06 DIAGNOSIS — K59.00 CONSTIPATION, UNSPECIFIED CONSTIPATION TYPE: ICD-10-CM

## 2022-05-06 DIAGNOSIS — Z79.01 CHRONIC ANTICOAGULATION: ICD-10-CM

## 2022-05-06 DIAGNOSIS — R05.9 COUGH: Primary | ICD-10-CM

## 2022-05-06 DIAGNOSIS — E11.21 TYPE 2 DIABETES WITH NEPHROPATHY (HCC): ICD-10-CM

## 2022-05-06 PROCEDURE — G8510 SCR DEP NEG, NO PLAN REQD: HCPCS | Performed by: FAMILY MEDICINE

## 2022-05-06 PROCEDURE — G8400 PT W/DXA NO RESULTS DOC: HCPCS | Performed by: FAMILY MEDICINE

## 2022-05-06 PROCEDURE — 3017F COLORECTAL CA SCREEN DOC REV: CPT | Performed by: FAMILY MEDICINE

## 2022-05-06 PROCEDURE — G8427 DOCREV CUR MEDS BY ELIG CLIN: HCPCS | Performed by: FAMILY MEDICINE

## 2022-05-06 PROCEDURE — G8754 DIAS BP LESS 90: HCPCS | Performed by: FAMILY MEDICINE

## 2022-05-06 PROCEDURE — G8752 SYS BP LESS 140: HCPCS | Performed by: FAMILY MEDICINE

## 2022-05-06 PROCEDURE — G9899 SCRN MAM PERF RSLTS DOC: HCPCS | Performed by: FAMILY MEDICINE

## 2022-05-06 PROCEDURE — G8417 CALC BMI ABV UP PARAM F/U: HCPCS | Performed by: FAMILY MEDICINE

## 2022-05-06 PROCEDURE — 1090F PRES/ABSN URINE INCON ASSESS: CPT | Performed by: FAMILY MEDICINE

## 2022-05-06 PROCEDURE — 99214 OFFICE O/P EST MOD 30 MIN: CPT | Performed by: FAMILY MEDICINE

## 2022-05-06 PROCEDURE — 1101F PT FALLS ASSESS-DOCD LE1/YR: CPT | Performed by: FAMILY MEDICINE

## 2022-05-06 PROCEDURE — G8536 NO DOC ELDER MAL SCRN: HCPCS | Performed by: FAMILY MEDICINE

## 2022-05-06 RX ORDER — TRAMADOL HYDROCHLORIDE 50 MG/1
50 TABLET ORAL
Qty: 90 TABLET | Refills: 0 | Status: SHIPPED | OUTPATIENT
Start: 2022-05-06 | End: 2022-08-04

## 2022-05-06 NOTE — PROGRESS NOTES
Jim Martínez  79 y.o. female  1955  [id number]  [facility] MEDICINE  Progress Note     Encounter Date: 5/6/2022    Assessment and Plan:     Encounter Diagnoses     ICD-10-CM ICD-9-CM   1. Cough  R05.9 786.2   2. Constipation, unspecified constipation type  K59.00 564.00   3. Osteoarthritis, unspecified osteoarthritis type, unspecified site  M19.90 715.90   4. Chronic anticoagulation  Z79.01 V58.61       1. Cough  Post covid cough, nearly resolved. CXR OK    2. Constipation, unspecified constipation type  Better with miralax. Titration of dose discussed    3. Osteoarthritis, unspecified osteoarthritis type, unspecified site  Chronic anticoagulation, cannot take NSAID  Continue daily tramadol   Is OK  - traMADoL (ULTRAM) 50 mg tablet; Take 1 Tablet by mouth every six (6) hours as needed for Pain for up to 90 days. Max Daily Amount: 200 mg. Dispense: 90 Tablet; Refill: 0    4. Chronic anticoagulation  A fib on Xarelto      I have discussed the diagnosis with the patient and the intended plan as seen in the above orders. she has expressed understanding. The patient has received an after-visit summary and questions were answered concerning future plans. I have discussed medication side effects and warnings with the patient as well. Electronically Signed: Titi Vitale MD    Current Medications after this visit     Current Outpatient Medications   Medication Sig    traMADoL (ULTRAM) 50 mg tablet Take 1 Tablet by mouth every six (6) hours as needed for Pain for up to 90 days. Max Daily Amount: 200 mg.  polyethylene glycol (MIRALAX) 17 gram packet Take 1 Packet by mouth daily.  omeprazole (PRILOSEC) 40 mg capsule Take 1 Capsule by mouth daily.  topiramate (TOPAMAX) 50 mg tablet Take 1 tablet by mouth nightly    dulaglutide (TRULICITY) 1.5 YW/5.4 mL sub-q pen 0.5 mL by SubCUTAneous route every seven (7) days.     amLODIPine (NORVASC) 10 mg tablet Take 1 Tablet by mouth daily.    amiodarone (CORDARONE) 200 mg tablet Take 1 Tablet by mouth daily.  furosemide (LASIX) 40 mg tablet TAKE 1 TABLET DAILY    isosorbide mononitrate ER (IMDUR) 30 mg tablet TAKE 1 TABLET EVERY MORNING    levothyroxine (Synthroid) 137 mcg tablet TAKE 1 TABLET DAILY BEFORE BREAKFAST    losartan (COZAAR) 100 mg tablet Take 1 Tablet by mouth daily.  oxybutynin (DITROPAN) 5 mg tablet TAKE 1 TABLET NIGHTLY    rivaroxaban (Xarelto) 20 mg tab tablet TAKE 1 TABLET DAILY    acetaminophen (TYLENOL EXTRA STRENGTH PO) Take  by mouth. No current facility-administered medications for this visit. There are no discontinued medications. ~~~~~~~~~~~~~~~~~~~~~~~~~~~~~~~~~~~~~~~~~~~~~~~~~~~~~~~~~~~    Chief Complaint   Patient presents with    Follow-up       History provided by patient  History of Present Illness   Angella Blanco is a 79 y.o. female who presents to clinic today for:  Follow-up    FU of cough   Nearly entirely gone  Breathing is better  She is unsure if acid blocker helped  She is no longer taking it consistently  Thinks cough went away on its own  Still gets dyspneic with walking but that has been present for some months and is not much different. CXR showed no infiltrates but did show cardiomegaly. Constipation  miralax took care of it  Now has cut back to every 2-3 days. Wants refill of tramadol for DJD  Averages one daily. Continues to do well with weight loss  Cannot take NSAIDS with Xarelto use. Tylenol not efficaciousl   looks OK. Health Maintenance  Will do at future visit  Health Maintenance Due   Topic Date Due    Shingrix Vaccine Age 49> (2 of 2) 09/24/2019    Bone Densitometry (Dexa) Screening  Never done    COVID-19 Vaccine (2 - Pfizer 3-dose series) 04/13/2021    Eye Exam Retinal or Dilated  09/18/2021     Review of Systems   Review of Systems   Constitutional: Negative for chills and fever. Respiratory: Positive for shortness of breath.  Negative for cough and sputum production. Cardiovascular: Negative for chest pain and leg swelling. Musculoskeletal: Positive for joint pain. Psychiatric/Behavioral: Negative. Vitals/Objective:     Vitals:    05/06/22 1119   BP: 134/69   Pulse: 60   Resp: 18   Temp: 97 °F (36.1 °C)   TempSrc: Temporal   SpO2: 97%   Weight: 294 lb (133.4 kg)   Height: 5' 8.5\" (1.74 m)     Body mass index is 44.05 kg/m². Wt Readings from Last 3 Encounters:   05/06/22 294 lb (133.4 kg)   04/06/22 296 lb 12.8 oz (134.6 kg)   02/21/22 298 lb (135.2 kg)         Objective  Physical Exam  Vitals and nursing note reviewed. Constitutional:       Appearance: Normal appearance. She is obese. She is not toxic-appearing. HENT:      Head: Normocephalic and atraumatic. Cardiovascular:      Rate and Rhythm: Normal rate and regular rhythm. Heart sounds: Normal heart sounds. No murmur heard. No gallop. Pulmonary:      Effort: Pulmonary effort is normal. No respiratory distress. Breath sounds: Normal breath sounds. No wheezing, rhonchi or rales. Musculoskeletal:      Cervical back: No muscular tenderness. Lymphadenopathy:      Cervical: No cervical adenopathy. Neurological:      Mental Status: She is alert. Psychiatric:         Mood and Affect: Mood normal.         Behavior: Behavior normal.         Thought Content: Thought content normal.         Judgment: Judgment normal.           No results found for this or any previous visit (from the past 24 hour(s)). Disposition     Follow-up and Dispositions  ·   Return in about 3 months (around 8/6/2022) for Medication follow up, Blood pressure follow up, Diabetes follow up, Weight follow up. .       No future appointments. History   Patient's past medical, surgical and family histories were reviewed and updated.     Past Medical History:   Diagnosis Date    Atrial fibrillation (HCC)     Chronic renal failure     Diabetes ([address]     Hearing loss     Hypertension     Migraine  Morbid obesity ([facility] [address]     Osteoarthritis     Sleep apnea     no CPAP    Thyroid disease      Past Surgical History:   Procedure Laterality Date    COLONOSCOPY N/A 12/4/2018    COLONOSCOPY performed by Emily Dorsey MD [address], normal, repeat 5 yeasr    HX DILATION AND CURETTAGE      HX HYSTERECTOMY      [place] CARDIAC SURG PROCEDURE UNLIST      cardioversion 11/16/2018    [place] CARDIAC SURG PROCEDURE UNLIST      ablation, 2016     Family History   Problem Relation Age of Onset    Heart Disease Mother     Hypertension Mother     Diabetes Mother      Social History     Tobacco Use    Smoking status: Former Smoker     Years: 1.00     Types: Cigarettes    Smokeless tobacco: Never Used   Substance Use Topics    Alcohol use: No    Drug use: No       Allergies     Allergies   Allergen Reactions    Ibuprofen Hives    Flowers Unable to [employer] Unable To Obtain Unable to [facility]

## 2022-05-06 NOTE — PROGRESS NOTES
Chief Complaint   Patient presents with    Follow-up     1. Have you been to the ER, urgent care clinic since your last visit? No  Hospitalized since your last visit? No   2. Have you seen or consulted any other health care providers outside of the [address] [place] [address] since your last visit? Include any pap smears or colon screening.  No

## 2022-06-22 ENCOUNTER — OFFICE VISIT (OUTPATIENT)
Dept: FAMILY MEDICINE CLINIC | Age: 67
End: 2022-06-22
Payer: MEDICARE

## 2022-06-22 ENCOUNTER — HOSPITAL ENCOUNTER (OUTPATIENT)
Dept: GENERAL RADIOLOGY | Age: 67
Discharge: HOME | End: 2022-06-22
Attending: FAMILY MEDICINE
Payer: MEDICARE

## 2022-06-22 VITALS
DIASTOLIC BLOOD PRESSURE: 76 MMHG | TEMPERATURE: 97.8 F | SYSTOLIC BLOOD PRESSURE: 143 MMHG | WEIGHT: 293 LBS | BODY MASS INDEX: 43.4 KG/M2 | RESPIRATION RATE: 24 BRPM | OXYGEN SATURATION: 97 % | HEIGHT: 69 IN | HEART RATE: 73 BPM

## 2022-06-22 DIAGNOSIS — S90.414A INFECTED ABRASION OF TOE OF RIGHT FOOT, INITIAL ENCOUNTER: ICD-10-CM

## 2022-06-22 DIAGNOSIS — L08.9 INFECTED ABRASION OF TOE OF RIGHT FOOT, INITIAL ENCOUNTER: ICD-10-CM

## 2022-06-22 DIAGNOSIS — L08.9 LOCAL SKIN INFECTION: Primary | ICD-10-CM

## 2022-06-22 PROCEDURE — G8753 SYS BP > OR = 140: HCPCS | Performed by: FAMILY MEDICINE

## 2022-06-22 PROCEDURE — 73630 X-RAY EXAM OF FOOT: CPT

## 2022-06-22 PROCEDURE — G8754 DIAS BP LESS 90: HCPCS | Performed by: FAMILY MEDICINE

## 2022-06-22 PROCEDURE — G8417 CALC BMI ABV UP PARAM F/U: HCPCS | Performed by: FAMILY MEDICINE

## 2022-06-22 PROCEDURE — G8427 DOCREV CUR MEDS BY ELIG CLIN: HCPCS | Performed by: FAMILY MEDICINE

## 2022-06-22 PROCEDURE — G8432 DEP SCR NOT DOC, RNG: HCPCS | Performed by: FAMILY MEDICINE

## 2022-06-22 PROCEDURE — G8400 PT W/DXA NO RESULTS DOC: HCPCS | Performed by: FAMILY MEDICINE

## 2022-06-22 PROCEDURE — 1123F ACP DISCUSS/DSCN MKR DOCD: CPT | Performed by: FAMILY MEDICINE

## 2022-06-22 PROCEDURE — G8536 NO DOC ELDER MAL SCRN: HCPCS | Performed by: FAMILY MEDICINE

## 2022-06-22 PROCEDURE — G9899 SCRN MAM PERF RSLTS DOC: HCPCS | Performed by: FAMILY MEDICINE

## 2022-06-22 PROCEDURE — 3017F COLORECTAL CA SCREEN DOC REV: CPT | Performed by: FAMILY MEDICINE

## 2022-06-22 PROCEDURE — 1090F PRES/ABSN URINE INCON ASSESS: CPT | Performed by: FAMILY MEDICINE

## 2022-06-22 PROCEDURE — 1101F PT FALLS ASSESS-DOCD LE1/YR: CPT | Performed by: FAMILY MEDICINE

## 2022-06-22 PROCEDURE — 99214 OFFICE O/P EST MOD 30 MIN: CPT | Performed by: FAMILY MEDICINE

## 2022-06-22 RX ORDER — CEPHALEXIN 500 MG/1
500 CAPSULE ORAL 4 TIMES DAILY
Qty: 40 CAPSULE | Refills: 0 | Status: SHIPPED | OUTPATIENT
Start: 2022-06-22 | End: 2022-07-02

## 2022-06-22 NOTE — PROGRESS NOTES
Patient stated name &     Chief Complaint   Patient presents with    Other     2 days ago stumped 2 left little toes on right foot at home    tomoguides, [employer]        Health Maintenance Due   Topic    Shingrix Vaccine Age 50> (2 of 2)    Bone Densitometry (Dexa) Screening     COVID-19 Vaccine (2 - Pfizer 3-dose series)    Eye Exam Retinal or Dilated        Wt Readings from Last 3 Encounters:   22 296 lb 9.6 oz (134.5 kg)   22 294 lb (133.4 kg)   22 296 lb 12.8 oz (134.6 kg)     Temp Readings from Last 3 Encounters:   22 97.8 °F (36.6 °C) (Temporal)   22 97 °F (36.1 °C) (Temporal)   22 98.2 °F (36.8 °C) (Temporal)     BP Readings from Last 3 Encounters:   22 (!) 143/76   22 134/69   22 137/65     Pulse Readings from Last 3 Encounters:   22 73   22 60   22 63         Learning Assessment:  :     Learning Assessment 2019   PRIMARY LEARNER Patient Patient   PRIMARY LANGUAGE ENGLISH ENGLISH   LEARNER PREFERENCE PRIMARY READING READING   ANSWERED BY patient Juan W Jesus Eastman   RELATIONSHIP SELF SELF       Depression Screening:  :     3 most recent PHQ Screens 2022   Little interest or pleasure in doing things Not at all   Feeling down, depressed, irritable, or hopeless Not at all   Total Score PHQ 2 0       Fall Risk Assessment:  :     Fall Risk Assessment, last 12 mths 2022   Able to walk? Yes   Fall in past 12 months? 0   Do you feel unsteady? 0   Are you worried about falling 0   Number of falls in past 12 months -   Fall with injury? -       Abuse Screening:  :     Abuse Screening Questionnaire 2022   Do you ever feel afraid of your partner? N N N   Are you in a relationship with someone who physically or mentally threatens you? N N N   Is it safe for you to go home? [facility]       Coordination of Care Questionnaire:  :     1) Have you been to an emergency room, urgent care clinic since your last visit? no   Hospitalized since your last visit? no             2) Have you seen or consulted any other health care providers outside of [address] [place] [address] since your last visit? no  (Include any pap smears or colon screenings in this section.)    3) Do you have an Advance Directive on file? no  Are you interested in receiving information about Advance Directives? no    Patient is accompanied by self I have received verbal consent from Maddie Canales to discuss any/all medical information while they are present in the room.

## 2022-06-22 NOTE — PROGRESS NOTES
2022   Gerald Zaman (: 1955) is a 79 y.o. female, new patient, here for evaluation of the following chief complaint(s): Other (2 days ago stumped 2 left little toes on right foot at home    [employer], Neosporin)     ASSESSMENT/PLAN:  Below is the assessment and plan developed based on review of pertinent history, physical exam, labs, studies, and medications. 1. Local skin infection  -     cephALEXin (KEFLEX) 500 mg capsule; Take 1 Capsule by mouth four (4) times daily for 10 days. , Normal, Disp-40 Capsule, R-0  2. Infected abrasion of toe of right foot, initial encounter  -     cephALEXin (KEFLEX) 500 mg capsule; Take 1 Capsule by mouth four (4) times daily for 10 days. , Normal, Disp-40 Capsule, R-0  -     XR 5TH TOE RT MIN 2 V; Future  -     XR 4TH TOE RT MIN 2 V; Future  -     XR FOOT RT MIN 3 V; Future    Return in about 2 weeks (around 2022) for follow up. Ms. Sunshine Mendenhall has been given the following recommendations today due to her elevated BP reading: rescreen BP within a minimum of 2 weeks. SUBJECTIVE/OBJECTIVE:  HPI   1. Local skin infection  Erythema noted around the abrasion on fourth right toe. I will start antibiotic. Denies fever chills or body aches. On Trulicity. 2. Infected abrasion of toe of right foot, initial encounter  Patient injured her fourth and fifth toe of the right foot at home on Monday 2 days ago. Noticed bruising, pain, abrasion. Applied bacitracin and Neosporin. Also noted yellow discharge from abrasion. Erythema is not spreading. No systemic symptoms. I will check x-rays. Patient has tramadol for pain control. I will start antibiotics. Advised to follow-up if no improvement. Patient is able to bear weight on foot. No results found for any visits on 22. Review of Systems   Constitutional: Negative. HENT: Negative. Eyes: Negative. Respiratory: Negative. Cardiovascular: Negative. Gastrointestinal: Negative. Genitourinary: Negative. Musculoskeletal: Negative. Skin: Negative. Neurological: Negative. Endo/Heme/Allergies: Negative. Psychiatric/Behavioral: Negative. Physical Exam  Vitals and nursing note reviewed. HENT:      Head: Normocephalic and atraumatic. Right Ear: External ear normal.      Left Ear: External ear normal.      Nose: Nose normal.   Eyes:      Conjunctiva/sclera: Conjunctivae normal.   Cardiovascular:      Rate and Rhythm: Normal rate and regular rhythm. Pulmonary:      Effort: Pulmonary effort is normal.      Breath sounds: Normal breath sounds. Abdominal:      General: Bowel sounds are normal. There is no distension. Palpations: Abdomen is soft. Tenderness: There is no abdominal tenderness. Musculoskeletal:         General: Normal range of motion. Cervical back: Normal range of motion and neck supple. Feet:    Feet:      Right foot:      Skin integrity: Skin breakdown and erythema present. Left foot:      Skin integrity: Skin integrity normal.   Skin:     General: Skin is warm and dry. Neurological:      General: No focal deficit present. Mental Status: She is alert. BP (!) 143/76 (BP 1 Location: Left upper arm, BP Patient Position: Sitting, BP Cuff Size: Adult)   Pulse 73   Temp 97.8 °F (36.6 °C) (Temporal)   Resp 24   Ht 5' 8.5\" (1.74 m)   Wt 296 lb 9.6 oz (134.5 kg)   SpO2 97%   BMI 44.44 kg/m²     Allergies   Allergen Reactions    Ibuprofen Hives    Flowers Unable to [employer] Unable To Obtain Unable to Obtain       Current Outpatient Medications   Medication Sig    cephALEXin (KEFLEX) 500 mg capsule Take 1 Capsule by mouth four (4) times daily for 10 days.  traMADoL (ULTRAM) 50 mg tablet Take 1 Tablet by mouth every six (6) hours as needed for Pain for up to 90 days. Max Daily Amount: 200 mg.  dulaglutide (TRULICITY) 1.5 TI/4.7 mL sub-q pen 0.5 mL by SubCUTAneous route every seven (7) days.     polyethylene glycol (MIRALAX) 17 gram packet Take 1 Packet by mouth daily.  omeprazole (PRILOSEC) 40 mg capsule Take 1 Capsule by mouth daily.  topiramate (TOPAMAX) 50 mg tablet Take 1 tablet by mouth nightly    amLODIPine (NORVASC) 10 mg tablet Take 1 Tablet by mouth daily.  amiodarone (CORDARONE) 200 mg tablet Take 1 Tablet by mouth daily.  furosemide (LASIX) 40 mg tablet TAKE 1 TABLET DAILY    isosorbide mononitrate ER (IMDUR) 30 mg tablet TAKE 1 TABLET EVERY MORNING    levothyroxine (Synthroid) 137 mcg tablet TAKE 1 TABLET DAILY BEFORE BREAKFAST    losartan (COZAAR) 100 mg tablet Take 1 Tablet by mouth daily.  oxybutynin (DITROPAN) 5 mg tablet TAKE 1 TABLET NIGHTLY    rivaroxaban (Xarelto) 20 mg tab tablet TAKE 1 TABLET DAILY    acetaminophen (TYLENOL EXTRA STRENGTH PO) Take  by mouth. No current facility-administered medications for this visit. Past Medical History:   Diagnosis Date    Atrial fibrillation ([facility]     Chronic renal failure     Diabetes ([facility]     Hearing loss     Hypertension     Migraine     Morbid obesity (HCC)     Osteoarthritis     Sleep apnea     no CPAP    Thyroid disease        Past Surgical History:   Procedure Laterality Date    COLONOSCOPY N/A 12/4/2018    COLONOSCOPY performed by Mireille Reyna MD at [facility] [address], normal, repeat 5 yeasr    HX DILATION AND CURETTAGE      HX HYSTERECTOMY      [place] CARDIAC SURG PROCEDURE UNLIST      cardioversion 11/16/2018    [place] CARDIAC SURG PROCEDURE UNLIST      ablation, 2016       Social History:  reports that she has quit smoking. Her smoking use included cigarettes. She quit after 1.00 year of use. She has never used smokeless tobacco. She reports that she does not drink alcohol and does not use drugs.     Patient Care Team:  Joaquin Cortés MD as PCP - General (Family Medicine)  Joaquin Cortés MD as PCP - [facility]    Problem List  Date Reviewed: 5/6/2022          Codes Class Noted    Angina pectoris, unspecified ICD-10-CM: I20.9  ICD-9-CM: 413.9  2/21/2022        Atherosclerotic heart disease of native coronary artery with unspecified angina pectoris ICD-10-CM: I25.119  ICD-9-CM: 414.01, 413.9  2/21/2022        Morbid obesity ([facility] ICD-10-CM: E66.01  ICD-9-CM: 278.01  3/11/2020        Dyspnea ICD-10-CM: R06.00  ICD-9-CM: 786.09  3/11/2020        Type 2 diabetes with nephropathy ([facility] ICD-10-CM: E11.21  ICD-9-CM: 250.40, 583.81  10/21/2019        Acquired hypothyroidism ICD-10-CM: E03.9  ICD-9-CM: 244.9  8/24/2018        Acquired trigger finger ICD-10-CM: M65.30  ICD-9-CM: 727.03  8/24/2018        Atrial fibrillation ([facility] ICD-10-CM: I48.91  ICD-9-CM: 427.31  8/24/2018        Body mass index (BMI) of 50.0 to 59.9 in adult [facility]) ICD-10-CM: Z68.43  ICD-9-CM: V85.43  8/24/2018        Chronic kidney disease (CKD), stage II (mild) ICD-10-CM: N18.2  ICD-9-CM: 585.2  8/24/2018        Classic migraine ICD-10-CM: G43.109  ICD-9-CM: 346.00  8/24/2018        Diffuse arthralgia ICD-10-CM: M25.50  ICD-9-CM: 719.40  8/24/2018        Essential hypertension ICD-10-CM: I10  ICD-9-CM: 401.9  8/24/2018        Hypercholesterolemia ICD-10-CM: E78.00  ICD-9-CM: 272.0  8/24/2018        Joint pain ICD-10-CM: M25.50  ICD-9-CM: 719.40  8/24/2018        Neck pain ICD-10-CM: M54.2  ICD-9-CM: 723.1  8/24/2018        Type 2 diabetes mellitus ([facility] ICD-10-CM: E11.9  ICD-9-CM: 250.00  8/24/2018                   I ADVISED PATIENT TO GO TO ER IF SYMPTOMS WORSEN , CHANGE OR FAILS TO IMPROVE. I have discussed the diagnosis with the patient and the intended plan as seen in the above orders. The patient has received an after-visit summary and questions were answered concerning future plans. I have discussed medication side effects and warnings with the patient as well. The patient agrees and understands above plan. An electronic signature was used to authenticate this note.   -- Boo Mcgregor MD

## 2022-08-11 ENCOUNTER — OFFICE VISIT (OUTPATIENT)
Dept: FAMILY MEDICINE CLINIC | Age: 67
End: 2022-08-11
Payer: MEDICARE

## 2022-08-11 VITALS
HEART RATE: 70 BPM | RESPIRATION RATE: 18 BRPM | SYSTOLIC BLOOD PRESSURE: 138 MMHG | WEIGHT: 293 LBS | BODY MASS INDEX: 44.41 KG/M2 | OXYGEN SATURATION: 97 % | TEMPERATURE: 95.8 F | DIASTOLIC BLOOD PRESSURE: 69 MMHG | HEIGHT: 68 IN

## 2022-08-11 DIAGNOSIS — M19.90 OSTEOARTHRITIS, UNSPECIFIED OSTEOARTHRITIS TYPE, UNSPECIFIED SITE: ICD-10-CM

## 2022-08-11 DIAGNOSIS — I48.20 CHRONIC ATRIAL FIBRILLATION (HCC): ICD-10-CM

## 2022-08-11 DIAGNOSIS — E11.21 TYPE 2 DIABETES WITH NEPHROPATHY (HCC): Primary | ICD-10-CM

## 2022-08-11 DIAGNOSIS — I10 ESSENTIAL HYPERTENSION: ICD-10-CM

## 2022-08-11 DIAGNOSIS — E66.01 MORBID OBESITY WITH BMI OF 45.0-49.9, ADULT (HCC): ICD-10-CM

## 2022-08-11 DIAGNOSIS — N39.41 URGE INCONTINENCE OF URINE: ICD-10-CM

## 2022-08-11 DIAGNOSIS — E03.9 ACQUIRED HYPOTHYROIDISM: ICD-10-CM

## 2022-08-11 LAB
ANION GAP SERPL CALC-SCNC: 9 MMOL/L (ref 5–15)
APPEARANCE UR: ABNORMAL
BACTERIA URNS QL MICRO: ABNORMAL /HPF
BASOPHILS # BLD: 0.1 K/UL (ref 0–0.1)
BASOPHILS NFR BLD: 1 % (ref 0–1)
BILIRUB UR QL: NEGATIVE
BUN SERPL-MCNC: 16 MG/DL (ref 6–20)
BUN/CREAT SERPL: 14 (ref 12–20)
CALCIUM SERPL-MCNC: 9.1 MG/DL (ref 8.5–10.1)
CAOX CRY URNS QL MICRO: ABNORMAL
CHLORIDE SERPL-SCNC: 109 MMOL/L (ref 97–108)
CO2 SERPL-SCNC: 23 MMOL/L (ref 21–32)
COLOR UR: ABNORMAL
CREAT SERPL-MCNC: 1.14 MG/DL (ref 0.55–1.02)
DIFFERENTIAL METHOD BLD: NORMAL
EOSINOPHIL # BLD: 0.2 K/UL (ref 0–0.4)
EOSINOPHIL NFR BLD: 3 % (ref 0–7)
EPITH CASTS URNS QL MICRO: ABNORMAL /LPF
ERYTHROCYTE [DISTWIDTH] IN BLOOD BY AUTOMATED COUNT: 14.5 % (ref 11.5–14.5)
EST. AVERAGE GLUCOSE BLD GHB EST-MCNC: 114 MG/DL
GLUCOSE SERPL-MCNC: 92 MG/DL (ref 65–100)
GLUCOSE UR STRIP.AUTO-MCNC: NEGATIVE MG/DL
HBA1C MFR BLD: 5.6 % (ref 4–5.6)
HCT VFR BLD AUTO: 44.3 % (ref 35–47)
HGB BLD-MCNC: 14.3 G/DL (ref 11.5–16)
HGB UR QL STRIP: NEGATIVE
IMM GRANULOCYTES # BLD AUTO: 0 K/UL (ref 0–0.04)
IMM GRANULOCYTES NFR BLD AUTO: 0 % (ref 0–0.5)
KETONES UR QL STRIP.AUTO: ABNORMAL MG/DL
LEUKOCYTE ESTERASE UR QL STRIP.AUTO: ABNORMAL
LYMPHOCYTES # BLD: 2.1 K/UL (ref 0.8–3.5)
LYMPHOCYTES NFR BLD: 26 % (ref 12–49)
MCH RBC QN AUTO: 28.6 PG (ref 26–34)
MCHC RBC AUTO-ENTMCNC: 32.3 G/DL (ref 30–36.5)
MCV RBC AUTO: 88.6 FL (ref 80–99)
MONOCYTES # BLD: 0.6 K/UL (ref 0–1)
MONOCYTES NFR BLD: 8 % (ref 5–13)
NEUTS SEG # BLD: 5.1 K/UL (ref 1.8–8)
NEUTS SEG NFR BLD: 62 % (ref 32–75)
NITRITE UR QL STRIP.AUTO: POSITIVE
NRBC # BLD: 0 K/UL (ref 0–0.01)
NRBC BLD-RTO: 0 PER 100 WBC
PH UR STRIP: 6.5 [PH] (ref 5–8)
PLATELET # BLD AUTO: 214 K/UL (ref 150–400)
PMV BLD AUTO: 11 FL (ref 8.9–12.9)
POTASSIUM SERPL-SCNC: 4 MMOL/L (ref 3.5–5.1)
PROT UR STRIP-MCNC: ABNORMAL MG/DL
RBC # BLD AUTO: 5 M/UL (ref 3.8–5.2)
RBC #/AREA URNS HPF: ABNORMAL /HPF (ref 0–5)
SODIUM SERPL-SCNC: 141 MMOL/L (ref 136–145)
SP GR UR REFRACTOMETRY: 1.02 (ref 1–1.03)
UA: UC IF INDICATED,UAUC: ABNORMAL
UROBILINOGEN UR QL STRIP.AUTO: 1 EU/DL (ref 0.2–1)
WBC # BLD AUTO: 8.1 K/UL (ref 3.6–11)
WBC URNS QL MICRO: ABNORMAL /HPF (ref 0–4)

## 2022-08-11 PROCEDURE — 3017F COLORECTAL CA SCREEN DOC REV: CPT | Performed by: FAMILY MEDICINE

## 2022-08-11 PROCEDURE — 1123F ACP DISCUSS/DSCN MKR DOCD: CPT | Performed by: FAMILY MEDICINE

## 2022-08-11 PROCEDURE — 1101F PT FALLS ASSESS-DOCD LE1/YR: CPT | Performed by: FAMILY MEDICINE

## 2022-08-11 PROCEDURE — G8400 PT W/DXA NO RESULTS DOC: HCPCS | Performed by: FAMILY MEDICINE

## 2022-08-11 PROCEDURE — 1090F PRES/ABSN URINE INCON ASSESS: CPT | Performed by: FAMILY MEDICINE

## 2022-08-11 PROCEDURE — G8427 DOCREV CUR MEDS BY ELIG CLIN: HCPCS | Performed by: FAMILY MEDICINE

## 2022-08-11 PROCEDURE — 99215 OFFICE O/P EST HI 40 MIN: CPT | Performed by: FAMILY MEDICINE

## 2022-08-11 PROCEDURE — G8536 NO DOC ELDER MAL SCRN: HCPCS | Performed by: FAMILY MEDICINE

## 2022-08-11 PROCEDURE — G8752 SYS BP LESS 140: HCPCS | Performed by: FAMILY MEDICINE

## 2022-08-11 PROCEDURE — G8510 SCR DEP NEG, NO PLAN REQD: HCPCS | Performed by: FAMILY MEDICINE

## 2022-08-11 PROCEDURE — 2022F DILAT RTA XM EVC RTNOPTHY: CPT | Performed by: FAMILY MEDICINE

## 2022-08-11 PROCEDURE — 3044F HG A1C LEVEL LT 7.0%: CPT | Performed by: FAMILY MEDICINE

## 2022-08-11 PROCEDURE — G9899 SCRN MAM PERF RSLTS DOC: HCPCS | Performed by: FAMILY MEDICINE

## 2022-08-11 PROCEDURE — G8417 CALC BMI ABV UP PARAM F/U: HCPCS | Performed by: FAMILY MEDICINE

## 2022-08-11 PROCEDURE — G8754 DIAS BP LESS 90: HCPCS | Performed by: FAMILY MEDICINE

## 2022-08-11 RX ORDER — TRAMADOL HYDROCHLORIDE 50 MG/1
50 TABLET ORAL
Qty: 90 TABLET | Refills: 0 | Status: SHIPPED | OUTPATIENT
Start: 2022-08-11 | End: 2022-11-04 | Stop reason: SDUPTHER

## 2022-08-11 RX ORDER — AMIODARONE HYDROCHLORIDE 200 MG/1
200 TABLET ORAL DAILY
Qty: 90 TABLET | Refills: 1 | Status: SHIPPED | OUTPATIENT
Start: 2022-08-11

## 2022-08-11 RX ORDER — ISOSORBIDE MONONITRATE 30 MG/1
TABLET, EXTENDED RELEASE ORAL
Qty: 90 TABLET | Refills: 1 | Status: SHIPPED | OUTPATIENT
Start: 2022-08-11 | End: 2022-08-11 | Stop reason: SDUPTHER

## 2022-08-11 RX ORDER — LEVOTHYROXINE SODIUM 137 UG/1
TABLET ORAL
Qty: 90 TABLET | Refills: 1 | Status: SHIPPED | OUTPATIENT
Start: 2022-08-11

## 2022-08-11 RX ORDER — LEVOTHYROXINE SODIUM 137 UG/1
TABLET ORAL
Qty: 90 TABLET | Refills: 1 | Status: SHIPPED | OUTPATIENT
Start: 2022-08-11 | End: 2022-08-11 | Stop reason: SDUPTHER

## 2022-08-11 RX ORDER — ISOSORBIDE MONONITRATE 30 MG/1
TABLET, EXTENDED RELEASE ORAL
Qty: 90 TABLET | Refills: 1 | Status: SHIPPED | OUTPATIENT
Start: 2022-08-11

## 2022-08-11 RX ORDER — LOSARTAN POTASSIUM 100 MG/1
100 TABLET ORAL DAILY
Qty: 90 TABLET | Refills: 1 | Status: SHIPPED | OUTPATIENT
Start: 2022-08-11

## 2022-08-11 RX ORDER — AMIODARONE HYDROCHLORIDE 200 MG/1
200 TABLET ORAL DAILY
Qty: 90 TABLET | Refills: 1 | Status: SHIPPED | OUTPATIENT
Start: 2022-08-11 | End: 2022-08-11 | Stop reason: SDUPTHER

## 2022-08-11 RX ORDER — LOSARTAN POTASSIUM 100 MG/1
100 TABLET ORAL DAILY
Qty: 90 TABLET | Refills: 1 | Status: SHIPPED | OUTPATIENT
Start: 2022-08-11 | End: 2022-08-11 | Stop reason: SDUPTHER

## 2022-08-11 RX ORDER — AMLODIPINE BESYLATE 10 MG/1
10 TABLET ORAL DAILY
Qty: 90 TABLET | Refills: 1 | Status: SHIPPED | OUTPATIENT
Start: 2022-08-11 | End: 2022-08-11 | Stop reason: SDUPTHER

## 2022-08-11 RX ORDER — AMLODIPINE BESYLATE 10 MG/1
10 TABLET ORAL DAILY
Qty: 90 TABLET | Refills: 1 | Status: SHIPPED | OUTPATIENT
Start: 2022-08-11

## 2022-08-11 RX ORDER — TOPIRAMATE 50 MG/1
50 TABLET, FILM COATED ORAL
Qty: 90 TABLET | Refills: 1 | Status: SHIPPED | OUTPATIENT
Start: 2022-08-11

## 2022-08-11 RX ORDER — TRAMADOL HYDROCHLORIDE 50 MG/1
50 TABLET ORAL
Qty: 90 TABLET | Refills: 0 | Status: SHIPPED | OUTPATIENT
Start: 2022-08-11 | End: 2022-08-11 | Stop reason: SDUPTHER

## 2022-08-11 NOTE — PROGRESS NOTES
Adelaide Song  79 y.o. female  1955  [facility]:[id number]  [facility]  Progress Note     Encounter Date: 8/11/2022    Assessment and Plan:     Encounter Diagnoses     ICD-10-CM ICD-9-CM   1. Type 2 diabetes with nephropathy (HCC)  E11.21 250.40     583.81   2. Essential hypertension  I10 401.9   3. Acquired hypothyroidism  E03.9 244.9   4. Chronic atrial fibrillation (HCC)  I48.20 427.31   5. Osteoarthritis, unspecified osteoarthritis type, unspecified site  M19.90 715.90   6. Urge incontinence of urine  N39.41 788.31   7. Morbid obesity with BMI of 45.0-49.9, adult ([facility]  E66.01 278.01    Z68.42 V85.42       1. Type 2 diabetes with nephropathy (HCC)  Advised to stick to diet, discussed. Diet is only thing that will keep her off of meds  Update labs, home sugars remain pretty good on diet only. - METABOLIC PANEL, BASIC  - HEMOGLOBIN A1C WITH EAG  - CBC WITH AUTOMATED DIFF    2. Essential hypertension  At goal, continue current meds  - losartan (COZAAR) 100 mg tablet; Take 1 Tablet by mouth in the morning. Dispense: 90 Tablet; Refill: 1  - amLODIPine (NORVASC) 10 mg tablet; Take 1 Tablet by mouth in the morning. Dispense: 90 Tablet; Refill: 1    3. Acquired hypothyroidism  Continue replacement  - levothyroxine (Synthroid) 137 mcg tablet; TAKE 1 TABLET DAILY BEFORE BREAKFAST  Dispense: 90 Tablet; Refill: 1    4. Chronic atrial fibrillation (HCC)  Refilled cardiac meds  Advised to resume lasix  - isosorbide mononitrate ER (IMDUR) 30 mg tablet; TAKE 1 TABLET EVERY MORNING  Dispense: 90 Tablet; Refill: 1  - amiodarone (CORDARONE) 200 mg tablet; Take 1 Tablet by mouth in the morning. Dispense: 90 Tablet; Refill: 1  - rivaroxaban (Xarelto) 20 mg tab tablet; TAKE 1 TABLET DAILY  Dispense: 90 Tablet; Refill: 1  Written RX for Xarelto for possible program to help her afford the medication.     5. Osteoarthritis, unspecified osteoarthritis type, unspecified site  Unable to take NSAIDs with xarelto   checked. No signs of misuse or diversion  Takes tylenol during the day. Takes a single tramadol daily for pain which will not allow her to sleep  - traMADoL (ULTRAM) 50 mg tablet; Take 1 Tablet by mouth every six (6) hours as needed for Pain for up to 90 days. Max Daily Amount: 200 mg. Dispense: 90 Tablet; Refill: 0    6. Urge incontinence of urine  Check for UTI-will treat if present  TO UROLOGY Dr. Kamari Mendoza for evaluation  - [address]/ REFLEX CULTURE    7. Morbid obesity with BMI of 45.0-49.9, adult ([facility]  Continues diabetic and weight loss diet which is discussed again today. - topiramate (TOPAMAX) 50 mg tablet; Take 1 Tablet by mouth nightly. Dispense: 90 Tablet; Refill: 1  Written RX for [employer]    I have discussed the diagnosis with the patient and the intended plan as seen in the above orders. she has expressed understanding. The patient has received an after-visit summary and questions were answered concerning future plans. I have discussed medication side effects and warnings with the patient as well. Electronically Signed: Lopez Toussaint MD    Current Medications after this visit     Current Outpatient Medications   Medication Sig    levothyroxine (Synthroid) 137 mcg tablet TAKE 1 TABLET DAILY BEFORE BREAKFAST    losartan (COZAAR) 100 mg tablet Take 1 Tablet by mouth in the morning. isosorbide mononitrate ER (IMDUR) 30 mg tablet TAKE 1 TABLET EVERY MORNING    amLODIPine (NORVASC) 10 mg tablet Take 1 Tablet by mouth in the morning. amiodarone (CORDARONE) 200 mg tablet Take 1 Tablet by mouth in the morning. topiramate (TOPAMAX) 50 mg tablet Take 1 Tablet by mouth nightly. rivaroxaban (Xarelto) 20 mg tab tablet TAKE 1 TABLET DAILY    traMADoL (ULTRAM) 50 mg tablet Take 1 Tablet by mouth every six (6) hours as needed for Pain for up to 90 days.  Max Daily Amount: 200 mg.    polyethylene glycol (MIRALAX) 17 gram packet Take 1 Packet by mouth daily. oxybutynin (DITROPAN) 5 mg tablet TAKE 1 TABLET NIGHTLY    acetaminophen (TYLENOL EXTRA STRENGTH PO) Take  by mouth. No current facility-administered medications for this visit. Medications Discontinued During This Encounter   Medication Reason    omeprazole (PRILOSEC) 40 mg capsule Not A Current Medication    dulaglutide (TRULICITY) 1.5 PHAN/0.6 mL sub-q pen Not A Current Medication    furosemide (LASIX) 40 mg tablet Not A Current Medication    amLODIPine (NORVASC) 10 mg tablet REORDER    amiodarone (CORDARONE) 200 mg tablet REORDER    isosorbide mononitrate ER (IMDUR) 30 mg tablet REORDER    levothyroxine (Synthroid) 137 mcg tablet REORDER    losartan (COZAAR) 100 mg tablet REORDER    losartan (COZAAR) 100 mg tablet REORDER    levothyroxine (Synthroid) 137 mcg tablet REORDER    isosorbide mononitrate ER (IMDUR) 30 mg tablet REORDER    amLODIPine (NORVASC) 10 mg tablet REORDER    amiodarone (CORDARONE) 200 mg tablet REORDER    rivaroxaban (Xarelto) 20 mg tab tablet REORDER    topiramate (TOPAMAX) 50 mg tablet REORDER    traMADoL (ULTRAM) 50 mg tablet REORDER     ~~~~~~~~~~~~~~~~~~~~~~~~~~~~~~~~~~~~~~~~~~~~~~~~~~~~~~~~~~~    Chief Complaint   Patient presents with    Medication Evaluation     Patient states bladder issue , frequent urination        History provided by patient  History of Present Illness   Lucho Harris is a 79 y.o. female who presents to clinic today for:  Medication Evaluation (Patient states bladder issue , frequent urination )    Terrible problems with bladder  Goes all the time  42 pee pads a week  7-8 pads in bed a week. Loses control when she does not wear pad  Smell of urine is ungodly  Ditropan did not help  No pain, just upsetting    DM2  Off of Trulicity  Still checks her sugars 2-3 times a week    even after meals. Sticking to diet  Staying off of the sweets except occasional donut.     Hypertension  At goal  Continues meds except stopped Lasix    Chronic pain  Needs refill of meds  Takes one tramadol at bedtime   OK  Cannot take NSAID's with Xarelto    A fib  Remains on xarelto and amio  Sees Dr. Alma Malik do at future visit  Health Maintenance Due   Topic Date Due    Shingrix Vaccine Age 49> (2 of 2) 09/24/2019    Bone Densitometry (Dexa) Screening  Never done    COVID-19 Vaccine (2 - Pfizer series) 04/13/2021    Eye Exam Retinal or Dilated  09/18/2021     Review of Systems   Review of Systems   Constitutional:  Negative for chills, fever and weight loss. HENT:  Positive for hearing loss. Respiratory:  Negative for shortness of breath. Cardiovascular:  Negative for chest pain, palpitations and leg swelling. Gastrointestinal: Negative. Genitourinary:  Positive for frequency and urgency. Negative for dysuria, flank pain and hematuria. Psychiatric/Behavioral: Negative. Vitals/Objective:     Vitals:    08/11/22 1025   BP: 138/69   Pulse: 70   Resp: 18   Temp: (!) 95.8 °F (35.4 °C)   TempSrc: Temporal   SpO2: 97%   Weight: 294 lb (133.4 kg)   Height: 5' 8\" (1.727 m)     Body mass index is 44.7 kg/m². Wt Readings from Last 3 Encounters:   08/11/22 294 lb (133.4 kg)   06/22/22 296 lb 9.6 oz (134.5 kg)   05/06/22 294 lb (133.4 kg)         Objective  Physical Exam  Vitals and nursing note reviewed. Constitutional:       Appearance: Normal appearance. She is obese. She is not toxic-appearing. Comments: Using cane and limping when bearing weight both knees   HENT:      Head: Normocephalic and atraumatic. Right Ear: Decreased hearing noted. Left Ear: Decreased hearing noted. Cardiovascular:      Rate and Rhythm: Normal rate and regular rhythm. Heart sounds: Normal heart sounds. No murmur heard. No gallop. Pulmonary:      Effort: Pulmonary effort is normal. No respiratory distress. Breath sounds: Normal breath sounds. No wheezing, rhonchi or rales.    Musculoskeletal: Cervical back: No muscular tenderness. Lymphadenopathy:      Cervical: No cervical adenopathy. Neurological:      Mental Status: She is alert. Psychiatric:         Mood and Affect: Mood normal.         Behavior: Behavior normal.         Thought Content: Thought content normal.         Judgment: Judgment normal.       No results found for this or any previous visit (from the past 24 hour(s)). Disposition     Follow-up and Dispositions    Return in about 6 months (around 2/11/2023) for Medication follow up, Diabetes follow up. No future appointments. History   Patient's past medical, surgical and family histories were reviewed and updated.     Past Medical History:   Diagnosis Date    Atrial fibrillation ([facility]     Chronic renal failure     Diabetes (HCC)     Hearing loss     Hypertension     Migraine     Morbid obesity ([facility]     Osteoarthritis     Sleep apnea     no CPAP    Thyroid disease      Past Surgical History:   Procedure Laterality Date    COLONOSCOPY N/A 12/4/2018    COLONOSCOPY performed by Ervin Wing MD [address], normal, repeat 5 yeasr    HX DILATION AND CURETTAGE      HX HYSTERECTOMY      [place] CARDIAC SURG PROCEDURE UNLIST      cardioversion 11/16/2018    [place] CARDIAC SURG PROCEDURE UNLIST      ablation, 2016     Family History   Problem Relation Age of Onset    Heart Disease Mother     Hypertension Mother     Diabetes Mother      Social History     Tobacco Use    Smoking status: Former     Years: 1.00     Types: Cigarettes    Smokeless tobacco: Never   Substance Use Topics    Alcohol use: No    Drug use: No       Allergies     Allergies   Allergen Reactions    Ibuprofen Hives    Flowers Unable to [employer]    Unable To Obtain Unable to [employer]

## 2022-08-11 NOTE — PROGRESS NOTES
Reviewed record in preparation for visit and have obtained necessary documentation. Identified pt with two pt identifiers(name and ). Chief Complaint   Patient presents with    Medication Evaluation     Patient states bladder issue , frequent urination      Blood pressure 138/69, pulse 70, temperature (!) 95.8 °F (35.4 °C), temperature source Temporal, resp. rate 18, height 5' 8\" (1.727 m), weight 294 lb (133.4 kg), SpO2 97 %. Health Maintenance Due   Topic Date Due    Shingles Vaccine (2 of 2) 2019    Bone Mineral Density   Never done    COVID-19 Vaccine (2 - Pfizer series) 2021    Eye Exam  2021       Ms. Davidson Zheng has a reminder for a \"due or due soon\" health maintenance. I have asked that she discuss this further with her primary care provider for follow-up on this health maintenance.       Coordination of Care Questionnaire:  :     1) Have you been to an emergency room, urgent care clinic since your last visit? no   Hospitalized since your last visit? no             2) Have you seen or consulted any other health care providers outside of [address] [place] [address] since your last visit? no

## 2022-08-11 NOTE — PATIENT INSTRUCTIONS
Diabetes:  Blood sugar goals:  Hemoglobin A1c under 7  Fasting blood sugar   Blood sugar 2 hours after a meal under 180, 4 hours after a meal under 120  No hypoglycemia (sugars under 70 and symptomatic low sugars)    Blood sugar control with diet and exercise:  Exercise 45 minutes per day. This makes your insulin work better. It also allows insulin levels to fall helping with weight loss. Every night, try to fast from your evening meal to breakfast (at least 12 hours) without eating anything. This uses stored energy in your liver and makes insulin work better. Avoid simples sugars such as table sugar in drinks (sodas, lemonade, sweet tea, wine), desserts, candy. Also avoid fruit juices and high fructose corn syrup. Avoid frequent consumption of fruit especially grapes and bananas. A single serving of fruit daily is all you should have. Finally, drink less milk which has milk sugar in it. Control your starch intake. Men should have 3-4 carb portions per meal.  Women should have 2-3 carb portions per meal.  A carb serving is the equivalent of a slice of bread. Control your blood pressure and cholesterol. These problems are common in diabetes. AND, don't smoke. All of these problems contribute to heart disease and stroke risk. Get a yearly eye exam and flu shot. Make sure your vaccines are up to date particularly the pneumonia vaccines. Inspect your feet daily. Wear comfortable protective shoes and clean socks. Seek medical care if there are sore, calluses or numbness and burning of your feet. See your doctor at least every 6 months if you are on oral medicines or more often if the diabetic control is not at goal or if you are on insulin. Take your medicines religiously. STOP the SUGAR    The first step in dietary efforts at weight loss is removing as much sugar from the diet as possible.   Most dietary sugar is in the forms of table sugar (sucrose), fruit sugar (fructose) and milk sugar (lactose). But, you need to watch labels to see if processed foods have added sugars under other names. To eliminate sucrose, eliminate sweet drinks entirely including soft drinks, sports drinks, lemonade, sweet tea and wine. Also, eliminate candy and make desserts a \"special occasion only treat\". Don't eat desserts with every meal or every night. Most fructose is found in fruit and this should be markedly limited. Fruit juice should be avoided. If you do eat fruit, eat fruits that are lower in sugar such as: strawberries, blackberries, raspberries, lemon, grapefruit and watermelon. Eat small portions and think of the fruit as a garnish or small treat. Bananas, mangos, cherries and grapes are higher in sugar and should be avoided. Avoid high fructose corn syrup (an artificial sweetener). Milk sugar, or lactose, should be avoided as well. Milk is a good source of calcium but not for people struggling with weight issues. Put a little cream in your coffee or tea but otherwise avoid milk. How can you avoid sugar? For starters, don't buy it and don't bring it in the house. It won't tempt you that way!     For more information:    Try the internet for videos about sugar addiction or try diet doctor.[employer].

## 2022-08-13 LAB
BACTERIA SPEC CULT: NORMAL
CC UR VC: NORMAL
SERVICE CMNT-IMP: NORMAL

## 2022-08-15 NOTE — PROGRESS NOTES
Your lab tests show now specific infection. Your diabetic control remains pretty good so stick to your diet. See the UROLOGISTS about your bladder issues as we discussed.   [facility]

## 2022-08-24 ENCOUNTER — PATIENT MESSAGE (OUTPATIENT)
Dept: FAMILY MEDICINE CLINIC | Age: 67
End: 2022-08-24

## 2022-08-24 DIAGNOSIS — I48.20 CHRONIC ATRIAL FIBRILLATION (HCC): ICD-10-CM

## 2022-11-04 ENCOUNTER — VIRTUAL VISIT (OUTPATIENT)
Dept: FAMILY MEDICINE CLINIC | Age: 67
End: 2022-11-04
Payer: MEDICARE

## 2022-11-04 DIAGNOSIS — M19.90 OSTEOARTHRITIS, UNSPECIFIED OSTEOARTHRITIS TYPE, UNSPECIFIED SITE: ICD-10-CM

## 2022-11-04 PROCEDURE — G8400 PT W/DXA NO RESULTS DOC: HCPCS | Performed by: FAMILY MEDICINE

## 2022-11-04 PROCEDURE — G8427 DOCREV CUR MEDS BY ELIG CLIN: HCPCS | Performed by: FAMILY MEDICINE

## 2022-11-04 PROCEDURE — 3017F COLORECTAL CA SCREEN DOC REV: CPT | Performed by: FAMILY MEDICINE

## 2022-11-04 PROCEDURE — 99213 OFFICE O/P EST LOW 20 MIN: CPT | Performed by: FAMILY MEDICINE

## 2022-11-04 PROCEDURE — 1123F ACP DISCUSS/DSCN MKR DOCD: CPT | Performed by: FAMILY MEDICINE

## 2022-11-04 PROCEDURE — G8756 NO BP MEASURE DOC: HCPCS | Performed by: FAMILY MEDICINE

## 2022-11-04 PROCEDURE — 1090F PRES/ABSN URINE INCON ASSESS: CPT | Performed by: FAMILY MEDICINE

## 2022-11-04 PROCEDURE — G8510 SCR DEP NEG, NO PLAN REQD: HCPCS | Performed by: FAMILY MEDICINE

## 2022-11-04 PROCEDURE — 1101F PT FALLS ASSESS-DOCD LE1/YR: CPT | Performed by: FAMILY MEDICINE

## 2022-11-04 PROCEDURE — G9899 SCRN MAM PERF RSLTS DOC: HCPCS | Performed by: FAMILY MEDICINE

## 2022-11-04 RX ORDER — TRAMADOL HYDROCHLORIDE 50 MG/1
50 TABLET ORAL
Qty: 90 TABLET | Refills: 0 | Status: SHIPPED | OUTPATIENT
Start: 2022-11-04 | End: 2023-02-02

## 2022-11-04 NOTE — PROGRESS NOTES
1. Have you been to the ER, urgent care clinic since your last visit? Hospitalized since your last visit? No    2. Have you seen or consulted any other health care providers outside of the [address] [place] [address] since your last visit? Include any pap smears or colon screening. No    Chief Complaint   Patient presents with    Medication Refill     Health Maintenance Due   Topic Date Due    DTaP/Tdap/Td series (1 - Tdap) 10/19/2018    Shingrix Vaccine Age 50> (2 of 2) 09/24/2019    Bone Densitometry (Dexa) Screening  Never done    COVID-19 Vaccine (2 - Pfizer series) 04/13/2021    Eye Exam Retinal or Dilated  09/18/2021    Flu Vaccine (1) 08/01/2022    Medicare Yearly Exam  09/21/2022     3 most recent PHQ Screens 11/4/2022   Little interest or pleasure in doing things Not at all   Feeling down, depressed, irritable, or hopeless Not at all   Total Score PHQ 2 0     Abuse Screening Questionnaire 11/4/2022   Do you ever feel afraid of your partner? N   Are you in a relationship with someone who physically or mentally threatens you? N   Is it safe for you to go home?  Y     Patient-Reported Vitals 11/4/2022   Patient-Reported Weight 294lb   Patient-Reported Height -   Patient-Reported Pulse -   Patient-Reported Temperature -   Patient-Reported Systolic  -   Patient-Reported Diastolic -      Learning Assessment 2/5/2019   PRIMARY LEARNER Patient   PRIMARY LANGUAGE ENGLISH   LEARNER PREFERENCE PRIMARY READING   ANSWERED BY patient   RELATIONSHIP SELF

## 2022-11-04 NOTE — PROGRESS NOTES
Carmen Christensen is a 79 y.o. female who was seen by synchronous (real-time) audio-video technology on 11/4/2022. Consent: Carmen Christensen, who was seen by synchronous (real-time) audio-video technology, and/or her healthcare decision maker, is aware that this patient-initiated, Telehealth encounter on 11/4/2022 is a billable service, with coverage as determined by her insurance carrier. She is aware that she may receive a bill and has provided verbal consent to proceed: Yes. Assessment & Plan:   Diagnoses and all orders for this visit:    1. Osteoarthritis, unspecified osteoarthritis type, unspecified site  -     traMADoL (ULTRAM) 50 mg tablet; Take 1 Tablet by mouth every six (6) hours as needed for Pain for up to 90 days. Max Daily Amount: 200 mg.  ok. NO other scheduled meds  No evidence of diversion or misuse  Tylenol during the day and tramadol at bedtime. Follow-up and Dispositions    Return in about 3 months (around 2/4/2023) for Medication follow up. I spent at least 23 minutes on this visit with this established patient. ([id number])    Subjective: Carmen Christensen is a 79 y.o. female who was seen for Medication Refill    Video visit for tramadol refill  Takes tylenol during the day  Tramadol at bedtimes for DJD  Cannot take NSAIDS due to Xarelto use   ok. No signs of misuse or diversion  No change in other meds  Still work on weight and continues to lose weight. Prior to Admission medications    Medication Sig Start Date End Date Taking? Authorizing Provider   traMADoL (ULTRAM) 50 mg tablet Take 1 Tablet by mouth every six (6) hours as needed for Pain for up to 90 days.  Max Daily Amount: 200 mg. 11/4/22 2/2/23 Yes Fady Zee MD   rivaroxaban (Xarelto) 20 mg tab tablet TAKE 1 TABLET DAILY 8/25/22   Fady Zee MD   levothyroxine (Synthroid) 137 mcg tablet TAKE 1 TABLET DAILY BEFORE BREAKFAST 8/11/22   Fady Zee MD   losartan (COZAAR) 100 mg tablet Take 1 Tablet by mouth in the morning. 8/11/22   Adonis Mckeon MD   isosorbide mononitrate ER (IMDUR) 30 mg tablet TAKE 1 TABLET EVERY MORNING 8/11/22   Adonis Mckeon MD   amLODIPine (NORVASC) 10 mg tablet Take 1 Tablet by mouth in the morning. 8/11/22   Adonis Mckeon MD   amiodarone (CORDARONE) 200 mg tablet Take 1 Tablet by mouth in the morning. 8/11/22   Adonis Mckeon MD   topiramate (TOPAMAX) 50 mg tablet Take 1 Tablet by mouth nightly. 8/11/22   Adonis Mckeon MD   traMADoL Sharon Елена) 50 mg tablet Take 1 Tablet by mouth every six (6) hours as needed for Pain for up to 90 days. Max Daily Amount: 200 mg. 8/11/22 11/4/22  Adonis Mckeon MD   polyethylene glycol (MIRALAX) 17 gram packet Take 1 Packet by mouth daily. 4/6/22   Adonis Mckeon MD   oxybutynin (DITROPAN) 5 mg tablet TAKE 1 TABLET NIGHTLY 2/21/22   Adonis Mckeon MD   acetaminophen (TYLENOL EXTRA STRENGTH PO) Take  by mouth. Provider, Historical     Allergies   Allergen Reactions    Ibuprofen Hives    Flowers Unable to Obtain    Unable To Obtain Unable to Obtain           Review of Systems   Constitutional:  Positive for weight loss. HENT:  Positive for hearing loss. Musculoskeletal:  Positive for joint pain. Psychiatric/Behavioral: Negative. Objective:   Vital Signs: (As obtained by patient/caregiver at home)  There were no vitals taken for this visit.      [INSTRUCTIONS:  \"[x]\" Indicates a positive item  \"[]\" Indicates a negative item  -- DELETE ALL ITEMS NOT EXAMINED]    Constitutional: [x] Appears well-developed and well-nourished [x] No apparent distress      [] Abnormal -     Mental status: [x] Alert and awake  [x] Oriented to person/place/time [x] Able to follow commands    [] Abnormal -     Eyes:   EOM    [x]  Normal    [] Abnormal -   Sclera  [x]  Normal    [] Abnormal -          Discharge [x]  None visible   [] Abnormal -     HENT: [x] Normocephalic, atraumatic  [] Abnormal -   [x] Mouth/Throat: Mucous membranes are moist    External Ears [x] Normal  [] Abnormal -    Neck: [x] No visualized mass [] Abnormal -     Pulmonary/Chest: [x] Respiratory effort normal   [x] No visualized signs of difficulty breathing or respiratory distress        [] Abnormal -      Musculoskeletal:   [x] Normal gait with no signs of ataxia         [x] Normal range of motion of neck        [] Abnormal -     Neurological:        [x] No Facial Asymmetry (Cranial nerve 7 motor function) (limited exam due to video visit)          [x] No gaze palsy        [] Abnormal -          Skin:        [x] No significant exanthematous lesions or discoloration noted on facial skin         [] Abnormal -            Psychiatric:       [x] Normal Affect [] Abnormal -        [x] No Hallucinations    Other pertinent observable physical exam findings:-        We discussed the expected course, resolution and complications of the diagnosis(es) in detail. Medication risks, benefits, costs, interactions, and alternatives were discussed as indicated. I advised her to contact the office if her condition worsens, changes or fails to improve as anticipated. She expressed understanding with the diagnosis(es) and plan. Gela Serrato is a 79 y.o. female who was evaluated by a video visit encounter for concerns as above. Patient identification was verified prior to start of the visit. A caregiver was present when appropriate. Due to this being a TeleHealth encounter (During [facility] public health emergency), evaluation of the following organ systems was limited: Vitals/Constitutional/EENT/Resp/CV/GI//MS/Neuro/Skin/Heme-Lymph-Imm.   Pursuant to the emergency declaration under the [facility] waiver authority and the [employer] Act, this Virtual  Visit was conducted, with patient's (and/or legal guardian's) consent, to reduce the patient's risk of exposure to COVID-19 and provide necessary medical care. Services were provided through a video synchronous discussion virtually to substitute for in-person clinic visit. Patient was at home and I was in office for this video visit. Darreld Jimmy.  Aide Cantu MD

## 2022-12-28 ENCOUNTER — OFFICE VISIT (OUTPATIENT)
Dept: FAMILY MEDICINE CLINIC | Age: 67
End: 2022-12-28
Payer: MEDICARE

## 2022-12-28 VITALS
SYSTOLIC BLOOD PRESSURE: 123 MMHG | HEIGHT: 68 IN | TEMPERATURE: 98 F | WEIGHT: 293 LBS | OXYGEN SATURATION: 99 % | BODY MASS INDEX: 44.41 KG/M2 | DIASTOLIC BLOOD PRESSURE: 70 MMHG | RESPIRATION RATE: 20 BRPM | HEART RATE: 61 BPM

## 2022-12-28 DIAGNOSIS — M19.90 OSTEOARTHRITIS, UNSPECIFIED OSTEOARTHRITIS TYPE, UNSPECIFIED SITE: ICD-10-CM

## 2022-12-28 DIAGNOSIS — E66.01 MORBID OBESITY WITH BMI OF 45.0-49.9, ADULT (HCC): ICD-10-CM

## 2022-12-28 DIAGNOSIS — H92.03 EAR PAIN, BILATERAL: Primary | ICD-10-CM

## 2022-12-28 PROCEDURE — 1090F PRES/ABSN URINE INCON ASSESS: CPT | Performed by: FAMILY MEDICINE

## 2022-12-28 PROCEDURE — G8432 DEP SCR NOT DOC, RNG: HCPCS | Performed by: FAMILY MEDICINE

## 2022-12-28 PROCEDURE — 3017F COLORECTAL CA SCREEN DOC REV: CPT | Performed by: FAMILY MEDICINE

## 2022-12-28 PROCEDURE — 99214 OFFICE O/P EST MOD 30 MIN: CPT | Performed by: FAMILY MEDICINE

## 2022-12-28 PROCEDURE — 1101F PT FALLS ASSESS-DOCD LE1/YR: CPT | Performed by: FAMILY MEDICINE

## 2022-12-28 PROCEDURE — G8427 DOCREV CUR MEDS BY ELIG CLIN: HCPCS | Performed by: FAMILY MEDICINE

## 2022-12-28 PROCEDURE — 3074F SYST BP LT 130 MM HG: CPT | Performed by: FAMILY MEDICINE

## 2022-12-28 PROCEDURE — G8536 NO DOC ELDER MAL SCRN: HCPCS | Performed by: FAMILY MEDICINE

## 2022-12-28 PROCEDURE — G8400 PT W/DXA NO RESULTS DOC: HCPCS | Performed by: FAMILY MEDICINE

## 2022-12-28 PROCEDURE — 1123F ACP DISCUSS/DSCN MKR DOCD: CPT | Performed by: FAMILY MEDICINE

## 2022-12-28 PROCEDURE — 3078F DIAST BP <80 MM HG: CPT | Performed by: FAMILY MEDICINE

## 2022-12-28 PROCEDURE — G9899 SCRN MAM PERF RSLTS DOC: HCPCS | Performed by: FAMILY MEDICINE

## 2022-12-28 PROCEDURE — G8417 CALC BMI ABV UP PARAM F/U: HCPCS | Performed by: FAMILY MEDICINE

## 2022-12-28 RX ORDER — DOXYCYCLINE HYCLATE 100 MG
100 TABLET ORAL 2 TIMES DAILY
Qty: 20 TABLET | Refills: 0 | Status: SHIPPED | OUTPATIENT
Start: 2022-12-28

## 2022-12-28 RX ORDER — TOPIRAMATE 50 MG/1
50 TABLET, FILM COATED ORAL
Qty: 90 TABLET | Refills: 1 | Status: SHIPPED | OUTPATIENT
Start: 2022-12-28

## 2022-12-28 RX ORDER — TRAMADOL HYDROCHLORIDE 50 MG/1
50 TABLET ORAL
Qty: 90 TABLET | Refills: 0 | Status: SHIPPED | OUTPATIENT
Start: 2022-12-28 | End: 2023-03-28

## 2022-12-28 NOTE — PROGRESS NOTES
Patient stated name &   Chief Complaint   Patient presents with    Ear Pain     Ear ache    1 month ago     \"Feels like a tunnel with water in my ears\"     Pain radiates to neck    Tried Tylenol          Health Maintenance Due   Topic    DTaP/Tdap/Td series (1 - Tdap)    Shingles Vaccine (2 of 2)    Bone Densitometry (Dexa) Screening     COVID-19 Vaccine (2 - Pfizer series)    Eye Exam Retinal or Dilated     Flu Vaccine (1)    Medicare Yearly Exam        Wt Readings from Last 3 Encounters:   22 296 lb 9.6 oz (134.5 kg)   22 294 lb (133.4 kg)   22 296 lb 9.6 oz (134.5 kg)     Temp Readings from Last 3 Encounters:   22 98 °F (36.7 °C) (Temporal)   22 (!) 95.8 °F (35.4 °C) (Temporal)   22 97.8 °F (36.6 °C) (Temporal)     BP Readings from Last 3 Encounters:   22 (!) 146/70   22 138/69   22 (!) 143/76     Pulse Readings from Last 3 Encounters:   22 61   22 70   22 73         Learning Assessment:  :     Learning Assessment 2019   PRIMARY LEARNER Patient Patient   PRIMARY LANGUAGE ENGLISH ENGLISH   LEARNER PREFERENCE PRIMARY READING READING   ANSWERED BY patient [facility]   RELATIONSHIP SELF SELF       Depression Screening:  :     3 most recent PHQ Screens 2022   Little interest or pleasure in doing things Not at all   Feeling down, depressed, irritable, or hopeless Not at all   Total Score PHQ 2 0       Fall Risk Assessment:  :     Fall Risk Assessment, last 12 mths 2022   Able to walk? Yes   Fall in past 12 months? 0   Do you feel unsteady? 0   Are you worried about falling 0   Number of falls in past 12 months -   Fall with injury? -       Abuse Screening:  :     Abuse Screening Questionnaire 2022   Do you ever feel afraid of your partner? N N N N   Are you in a relationship with someone who physically or mentally threatens you? N N N N   Is it safe for you to go home?  Oriana Garcia       Coordination of Care Questionnaire:  :   1. \"Have you been to the ER, urgent care clinic since your last visit? Hospitalized since your last visit? \" No    2. \"Have you seen or consulted any other health care providers outside of the [address] [place] [address] since your last visit? \" No     3. For patients aged 39-70: Has the patient had a colonoscopy / FIT/ Cologuard? No      If the patient is female:    4. For patients aged 41-77: Has the patient had a mammogram within the past 2 years? No      5. For patients aged 21-65: Has the patient had a pap smear? No     3) Do you have an Advance Directive on file? no  Are you interested in receiving information about Advance Directives? no    Patient is accompanied by self I have received verbal consent from Denny Unger to discuss any/all medical information while they are present in the room.

## 2022-12-28 NOTE — PROGRESS NOTES
David Suarez  79 y.o. female  1955  [id number]  [facility]  Progress Note     Encounter Date: 12/28/2022    Assessment and Plan:     Encounter Diagnoses     ICD-10-CM ICD-9-CM   1. Ear pain, bilateral  H92.03 388.70   2. Osteoarthritis, unspecified osteoarthritis type, unspecified site  M19.90 715.90   3. Morbid obesity with BMI of 45.0-49.9, adult ([facility]  E66.01 278.01    Z68.42 V85.42       1. Ear pain, bilateral  Unclear cause  Could be pharyngeal infection, TMJ or dental in origin  To ENT If no better. Given names of Dr. Josefa Ahuja and Dr. Handley. Trial of antibx. - doxycycline (VIBRA-TABS) 100 mg tablet; Take 1 Tablet by mouth two (2) times a day. Dispense: 20 Tablet; Refill: 0    2. Osteoarthritis, unspecified osteoarthritis type, unspecified site  Refilled,   OK  Takes because she cannot take NSAIDS with Xarelto  - traMADoL (ULTRAM) 50 mg tablet; Take 1 Tablet by mouth every six (6) hours as needed for Pain for up to 90 days. Max Daily Amount: 200 mg. Dispense: 90 Tablet; Refill: 0    3. Morbid obesity with BMI of 45.0-49.9, adult ([facility]  Refilled. Controlling her weight has kept her A1c in a range where she is not needing diabetic meds. - topiramate (TOPAMAX) 50 mg tablet; Take 1 Tablet by mouth nightly. Dispense: 90 Tablet; Refill: 1    I have discussed the diagnosis with the patient and the intended plan as seen in the above orders. she has expressed understanding. The patient has received an after-visit summary and questions were answered concerning future plans. I have discussed medication side effects and warnings with the patient as well. Electronically Signed: Mel Arguello MD    Current Medications after this visit     Current Outpatient Medications   Medication Sig    traMADoL (ULTRAM) 50 mg tablet Take 1 Tablet by mouth every six (6) hours as needed for Pain for up to 90 days.  Max Daily Amount: 200 mg.    topiramate (TOPAMAX) 50 mg tablet Take 1 Tablet by mouth nightly. doxycycline (VIBRA-TABS) 100 mg tablet Take 1 Tablet by mouth two (2) times a day. rivaroxaban (Xarelto) 20 mg tab tablet TAKE 1 TABLET DAILY    levothyroxine (Synthroid) 137 mcg tablet TAKE 1 TABLET DAILY BEFORE BREAKFAST    losartan (COZAAR) 100 mg tablet Take 1 Tablet by mouth in the morning. isosorbide mononitrate ER (IMDUR) 30 mg tablet TAKE 1 TABLET EVERY MORNING    amLODIPine (NORVASC) 10 mg tablet Take 1 Tablet by mouth in the morning. amiodarone (CORDARONE) 200 mg tablet Take 1 Tablet by mouth in the morning. polyethylene glycol (MIRALAX) 17 gram packet Take 1 Packet by mouth daily. oxybutynin (DITROPAN) 5 mg tablet TAKE 1 TABLET NIGHTLY    acetaminophen (TYLENOL EXTRA STRENGTH PO) Take  by mouth. No current facility-administered medications for this visit. Medications Discontinued During This Encounter   Medication Reason    topiramate (TOPAMAX) 50 mg tablet REORDER    traMADoL (ULTRAM) 50 mg tablet REORDER     ~~~~~~~~~~~~~~~~~~~~~~~~~~~~~~~~~~~~~~~~~~~~~~~~~~~~~~~~~~~    Chief Complaint   Patient presents with    Ear Pain     Ear ache    1 month ago     \"Feels like a tunnel with water in my ears\"     Pain radiates to neck    Tried Tylenol         History provided by patient  History of Present Illness   Zia Brown is a 79 y.o. female who presents to clinic today for:  Ear Pain (Ear ache    1 month ago     \"Feels like a tunnel with water in my ears\"     Pain radiates to neck    Tried Tylenol  )    Ear pain. Stabbing pain. Feels she is in a tunnel. Echoes in her ear. Hearing is worse  Hurting over a month  Even hurts in her throat/neck area bilaterally. No painful swallowing or sore throat. Some facial congestion and sinus stuffiness  No runny nose. Headache off and on all the time. Needs refill of tramadol and topamax  Saw UROLOGY and is supposed to FU first of the year.      Health Maintenance  Will do at future visit  Health Maintenance Due   Topic Date Due    DTaP/Tdap/Td series (1 - Tdap) 10/19/2018    Shingles Vaccine (2 of 2) 09/24/2019    Bone Densitometry (Dexa) Screening  Never done    COVID-19 Vaccine (2 - Pfizer series) 04/13/2021    Eye Exam Retinal or Dilated  09/18/2021    Flu Vaccine (1) 08/01/2022    Medicare Yearly Exam  09/21/2022     Review of Systems   Review of Systems   Constitutional:  Negative for chills and fever. HENT:  Positive for congestion, ear pain, hearing loss and nosebleeds. Negative for sore throat. Respiratory:  Negative for cough and shortness of breath. Cardiovascular:  Negative for chest pain. Neurological:  Positive for headaches. Psychiatric/Behavioral: Negative. Vitals/Objective:     Vitals:    12/28/22 1515 12/28/22 1519   BP: (!) 146/70 123/70   Pulse: 61    Resp: 20    Temp: 98 °F (36.7 °C)    TempSrc: Temporal    SpO2: 99%    Weight: 296 lb 9.6 oz (134.5 kg)    Height: 5' 8\" (1.727 m)      Body mass index is 45.1 kg/m². Wt Readings from Last 3 Encounters:   12/28/22 296 lb 9.6 oz (134.5 kg)   08/11/22 294 lb (133.4 kg)   06/22/22 296 lb 9.6 oz (134.5 kg)         Objective  Physical Exam  Constitutional:       General: She is not in acute distress. Appearance: She is well-developed. She is obese. She is not diaphoretic. HENT:      Head: Normocephalic. Right Ear: Tympanic membrane, ear canal and external ear normal. Decreased hearing noted. Left Ear: Tympanic membrane, ear canal and external ear normal. Decreased hearing noted. Ears:      Comments: Has hearing aids on order but has not gotten to try them yet. Nose: No nasal deformity, congestion or rhinorrhea. Right Nostril: No epistaxis. Left Nostril: Epistaxis present. Mouth/Throat:      Tongue: No lesions. Tongue does not deviate from midline. Palate: No mass and lesions. Pharynx: Oropharynx is clear. Uvula midline. No oropharyngeal exudate, posterior oropharyngeal erythema or uvula swelling. Comments: Mildly tender TMJ L>R  Eyes:      General:         Right eye: No discharge. Left eye: No discharge. Conjunctiva/sclera: Conjunctivae normal.   Neck:      Thyroid: No thyroid mass, thyromegaly or thyroid tenderness. Cardiovascular:      Rate and Rhythm: Normal rate and regular rhythm. Heart sounds: Normal heart sounds. No murmur heard. No friction rub. No gallop. Pulmonary:      Effort: Pulmonary effort is normal. No respiratory distress. Breath sounds: Normal breath sounds. No wheezing or rales. Musculoskeletal:      Cervical back: Neck supple. Lymphadenopathy:      Cervical: No cervical adenopathy. Neurological:      Mental Status: She is alert and oriented to person, place, and time. Psychiatric:         Behavior: Behavior normal.         Thought Content: Thought content normal.         Judgment: Judgment normal.         No results found for this or any previous visit (from the past 24 hour(s)). Disposition     Follow-up and Dispositions    Return in about 2 months (around 2/28/2023) for Blood pressure follow up, Medication follow up, Diabetes follow up. No future appointments. History   Patient's past medical, surgical and family histories were reviewed and updated.     Past Medical History:   Diagnosis Date    Atrial fibrillation ([address]     Chronic renal failure     Diabetes ([address]     Hearing loss     Hypertension     Migraine     Morbid obesity ([address]     Osteoarthritis     Sleep apnea     no CPAP    Thyroid disease      Past Surgical History:   Procedure Laterality Date    COLONOSCOPY N/A 12/4/2018    COLONOSCOPY performed by Berenice Burkitt, MD at [facility] ENDOSCOPY, normal, repeat 5 yeasr    HX DILATION AND CURETTAGE      HX HYSTERECTOMY      IA CARDIAC SURG PROCEDURE UNLIST      cardioversion 11/16/2018    IA CARDIAC SURG PROCEDURE UNLIST      ablation, 2016     Family History   Problem Relation Age of Onset    Heart Disease Mother     Hypertension Mother     Diabetes Mother      Social History     Tobacco Use    Smoking status: Former     Years: 1.00     Types: Cigarettes    Smokeless tobacco: Never   Substance Use Topics    Alcohol use: No    Drug use: No       Allergies     Allergies   Allergen Reactions    Ibuprofen Hives    Flowers Unable to [employer] [place]    Unable To Obtain Unable to [employer]

## 2023-02-20 DIAGNOSIS — I48.20 CHRONIC ATRIAL FIBRILLATION (HCC): ICD-10-CM

## 2023-02-20 DIAGNOSIS — I10 ESSENTIAL HYPERTENSION: ICD-10-CM

## 2023-02-20 DIAGNOSIS — E66.01 MORBID OBESITY WITH BMI OF 45.0-49.9, ADULT (HCC): ICD-10-CM

## 2023-02-20 RX ORDER — AMIODARONE HYDROCHLORIDE 200 MG/1
200 TABLET ORAL DAILY
Qty: 90 TABLET | Refills: 1 | Status: SHIPPED | OUTPATIENT
Start: 2023-02-20

## 2023-02-20 RX ORDER — ISOSORBIDE MONONITRATE 30 MG/1
TABLET, EXTENDED RELEASE ORAL
Qty: 90 TABLET | Refills: 1 | Status: SHIPPED | OUTPATIENT
Start: 2023-02-20

## 2023-02-20 RX ORDER — TOPIRAMATE 50 MG/1
50 TABLET, FILM COATED ORAL
Qty: 90 TABLET | Refills: 1 | Status: SHIPPED | OUTPATIENT
Start: 2023-02-20 | End: 2023-02-21 | Stop reason: SDUPTHER

## 2023-02-20 RX ORDER — LOSARTAN POTASSIUM 100 MG/1
100 TABLET ORAL DAILY
Qty: 90 TABLET | Refills: 1 | Status: SHIPPED | OUTPATIENT
Start: 2023-02-20

## 2023-02-21 ENCOUNTER — PATIENT MESSAGE (OUTPATIENT)
Dept: FAMILY MEDICINE CLINIC | Age: 68
End: 2023-02-21

## 2023-02-21 DIAGNOSIS — E66.01 MORBID OBESITY WITH BMI OF 45.0-49.9, ADULT (HCC): ICD-10-CM

## 2023-02-21 RX ORDER — TOPIRAMATE 50 MG/1
50 TABLET, FILM COATED ORAL
Qty: 90 TABLET | Refills: 1 | Status: SHIPPED | OUTPATIENT
Start: 2023-02-21 | End: 2023-02-21 | Stop reason: SDUPTHER

## 2023-02-21 RX ORDER — TOPIRAMATE 50 MG/1
50 TABLET, FILM COATED ORAL
Qty: 90 TABLET | Refills: 1 | Status: SHIPPED | OUTPATIENT
Start: 2023-02-21

## 2023-02-24 ENCOUNTER — PATIENT MESSAGE (OUTPATIENT)
Dept: FAMILY MEDICINE CLINIC | Age: 68
End: 2023-02-24

## 2023-02-24 DIAGNOSIS — E66.01 MORBID OBESITY WITH BMI OF 45.0-49.9, ADULT (HCC): ICD-10-CM

## 2023-02-24 NOTE — TELEPHONE ENCOUNTER
PCP: Savi Chester MD    Last appt: 12/28/2022  No future appointments. Requested Prescriptions     Pending Prescriptions Disp Refills    topiramate (TOPAMAX) 50 mg tablet 90 Tablet 1     Sig: Take 1 Tablet by mouth nightly.        Prior labs and Blood pressures:  BP Readings from Last 3 Encounters:   12/28/22 123/70   08/11/22 138/69   06/22/22 (!) 143/76     Lab Results   Component Value Date/Time    Sodium 141 08/11/2022 11:53 AM    Potassium 4.0 08/11/2022 11:53 AM    Chloride 109 (H) 08/11/2022 11:53 AM    CO2 23 08/11/2022 11:53 AM    Anion gap 9 08/11/2022 11:53 AM    Glucose 92 08/11/2022 11:53 AM    BUN 16 08/11/2022 11:53 AM    Creatinine 1.14 (H) 08/11/2022 11:53 AM    BUN/Creatinine ratio 14 08/11/2022 11:53 AM    GFR est AA 58 (L) 08/11/2022 11:53 AM    GFR est non-AA 48 (L) 08/11/2022 11:53 AM    Calcium 9.1 08/11/2022 11:53 AM     Lab Results   Component Value Date/Time    Hemoglobin A1c 5.6 08/11/2022 11:53 AM    Hemoglobin A1c, External 7.3 06/02/2018 12:00 AM     Lab Results   Component Value Date/Time    Cholesterol, total 273 (H) 02/21/2022 03:15 PM    HDL Cholesterol 55 02/21/2022 03:15 PM    LDL, calculated 190.2 (H) 02/21/2022 03:15 PM    VLDL, calculated 27.8 02/21/2022 03:15 PM    Triglyceride 139 02/21/2022 03:15 PM    CHOL/HDL Ratio 5.0 02/21/2022 03:15 PM     No results found for: Magda Villalpando VD3RIA    Lab Results   Component Value Date/Time    TSH 0.37 02/21/2022 03:15 PM

## 2023-02-27 RX ORDER — TOPIRAMATE 50 MG/1
50 TABLET, FILM COATED ORAL
Qty: 90 TABLET | Refills: 1 | Status: SHIPPED | OUTPATIENT
Start: 2023-02-27

## 2023-05-18 RX ORDER — LOSARTAN POTASSIUM 100 MG/1
100 TABLET ORAL DAILY
COMMUNITY
Start: 2023-02-20

## 2023-05-18 RX ORDER — LEVOTHYROXINE SODIUM 137 UG/1
TABLET ORAL
COMMUNITY
Start: 2022-08-11

## 2023-05-18 RX ORDER — AMIODARONE HYDROCHLORIDE 200 MG/1
200 TABLET ORAL DAILY
COMMUNITY
Start: 2023-02-20

## 2023-05-18 RX ORDER — TOPIRAMATE 50 MG/1
2 TABLET, FILM COATED ORAL NIGHTLY
COMMUNITY
Start: 2023-04-11

## 2023-05-18 RX ORDER — POLYETHYLENE GLYCOL 3350 17 G/17G
17 POWDER, FOR SOLUTION ORAL DAILY
COMMUNITY
Start: 2022-04-06

## 2023-05-18 RX ORDER — AMLODIPINE BESYLATE 10 MG/1
10 TABLET ORAL DAILY
COMMUNITY
Start: 2022-08-11

## 2023-05-18 RX ORDER — TRAMADOL HYDROCHLORIDE 50 MG/1
50 TABLET ORAL
COMMUNITY
Start: 2023-04-11 | End: 2023-07-10

## 2023-05-18 RX ORDER — ISOSORBIDE MONONITRATE 30 MG/1
1 TABLET, EXTENDED RELEASE ORAL EVERY MORNING
COMMUNITY
Start: 2023-02-20

## 2023-07-17 NOTE — TELEPHONE ENCOUNTER
PCP: Farrukh Villalpando MD    Last appt: [unfilled]  Future Appointments   Date Time Provider [address]   8/9/2023  1:00 PM Farrukh Villalpando MD [facility]       Requested Prescriptions     Pending Prescriptions Disp Refills    rivaroxaban (XARELTO) 20 MG TABS tablet 30 tablet      Sig: Take 1 tablet by mouth daily       Prior labs and Blood pressures:  BP Readings from Last 3 Encounters:   12/28/22 123/70   08/11/22 138/69   06/22/22 (!) 143/76     Lab Results   Component Value Date/Time     04/18/2023 09:23 AM    K 3.9 04/18/2023 09:23 AM     04/18/2023 09:23 AM    CO2 19 04/18/2023 09:23 AM    BUN 23 04/18/2023 09:23 AM    GFRAA 58 08/11/2022 11:53 AM     No results found for: HBA1C, VSI9ALDM  Lab Results   Component Value Date/Time    CHOL 216 04/18/2023 09:23 AM    HDL 59 04/18/2023 09:23 AM     No results found for: VITD3, VD3RIA    Lab Results   Component Value Date/Time    TSH 0.37 02/21/2022 03:15 PM

## 2023-08-07 SDOH — ECONOMIC STABILITY: FOOD INSECURITY: WITHIN THE PAST 12 MONTHS, THE FOOD YOU BOUGHT JUST DIDN'T LAST AND YOU DIDN'T HAVE MONEY TO GET MORE.: NEVER TRUE

## 2023-08-07 SDOH — ECONOMIC STABILITY: INCOME INSECURITY: HOW HARD IS IT FOR YOU TO PAY FOR THE VERY BASICS LIKE FOOD, HOUSING, MEDICAL CARE, AND HEATING?: NOT HARD AT ALL

## 2023-08-07 SDOH — ECONOMIC STABILITY: FOOD INSECURITY: WITHIN THE PAST 12 MONTHS, YOU WORRIED THAT YOUR FOOD WOULD RUN OUT BEFORE YOU GOT MONEY TO BUY MORE.: NEVER TRUE

## 2023-08-07 SDOH — ECONOMIC STABILITY: TRANSPORTATION INSECURITY
IN THE PAST 12 MONTHS, HAS LACK OF TRANSPORTATION KEPT YOU FROM MEETINGS, WORK, OR FROM GETTING THINGS NEEDED FOR DAILY LIVING?: NO

## 2023-08-07 SDOH — ECONOMIC STABILITY: HOUSING INSECURITY
IN THE LAST 12 MONTHS, WAS THERE A TIME WHEN YOU DID NOT HAVE A STEADY PLACE TO SLEEP OR SLEPT IN A SHELTER (INCLUDING NOW)?: NO

## 2023-08-09 ENCOUNTER — OFFICE VISIT (OUTPATIENT)
Facility: CLINIC | Age: 68
End: 2023-08-09
Payer: MEDICARE

## 2023-08-09 VITALS
HEIGHT: 68 IN | WEIGHT: 293 LBS | RESPIRATION RATE: 16 BRPM | HEART RATE: 60 BPM | DIASTOLIC BLOOD PRESSURE: 72 MMHG | BODY MASS INDEX: 44.41 KG/M2 | SYSTOLIC BLOOD PRESSURE: 151 MMHG | OXYGEN SATURATION: 95 % | TEMPERATURE: 97.9 F

## 2023-08-09 DIAGNOSIS — M19.91 PRIMARY OSTEOARTHRITIS, UNSPECIFIED SITE: Primary | ICD-10-CM

## 2023-08-09 DIAGNOSIS — C44.300 SKIN CANCER OF FACE: ICD-10-CM

## 2023-08-09 DIAGNOSIS — I48.20 CHRONIC ATRIAL FIBRILLATION, UNSPECIFIED (HCC): ICD-10-CM

## 2023-08-09 DIAGNOSIS — N18.31 STAGE 3A CHRONIC KIDNEY DISEASE (HCC): ICD-10-CM

## 2023-08-09 DIAGNOSIS — I10 ESSENTIAL (PRIMARY) HYPERTENSION: ICD-10-CM

## 2023-08-09 PROBLEM — I20.9 ANGINA PECTORIS, UNSPECIFIED (HCC): Status: RESOLVED | Noted: 2022-02-21 | Resolved: 2023-08-09

## 2023-08-09 PROBLEM — I25.119 ATHEROSCLEROTIC HEART DISEASE OF NATIVE CORONARY ARTERY WITH UNSPECIFIED ANGINA PECTORIS (HCC): Status: RESOLVED | Noted: 2022-02-21 | Resolved: 2023-08-09

## 2023-08-09 PROBLEM — N18.30 CHRONIC RENAL DISEASE, STAGE III (HCC): Status: ACTIVE | Noted: 2023-08-09

## 2023-08-09 PROCEDURE — G8417 CALC BMI ABV UP PARAM F/U: HCPCS | Performed by: FAMILY MEDICINE

## 2023-08-09 PROCEDURE — G8400 PT W/DXA NO RESULTS DOC: HCPCS | Performed by: FAMILY MEDICINE

## 2023-08-09 PROCEDURE — 3017F COLORECTAL CA SCREEN DOC REV: CPT | Performed by: FAMILY MEDICINE

## 2023-08-09 PROCEDURE — 99214 OFFICE O/P EST MOD 30 MIN: CPT | Performed by: FAMILY MEDICINE

## 2023-08-09 PROCEDURE — 1090F PRES/ABSN URINE INCON ASSESS: CPT | Performed by: FAMILY MEDICINE

## 2023-08-09 PROCEDURE — G8427 DOCREV CUR MEDS BY ELIG CLIN: HCPCS | Performed by: FAMILY MEDICINE

## 2023-08-09 PROCEDURE — 1123F ACP DISCUSS/DSCN MKR DOCD: CPT | Performed by: FAMILY MEDICINE

## 2023-08-09 PROCEDURE — 3077F SYST BP >= 140 MM HG: CPT | Performed by: FAMILY MEDICINE

## 2023-08-09 PROCEDURE — 4004F PT TOBACCO SCREEN RCVD TLK: CPT | Performed by: FAMILY MEDICINE

## 2023-08-09 PROCEDURE — 3078F DIAST BP <80 MM HG: CPT | Performed by: FAMILY MEDICINE

## 2023-08-09 RX ORDER — TRAMADOL HYDROCHLORIDE 50 MG/1
50 TABLET ORAL EVERY 6 HOURS PRN
Qty: 90 TABLET | Refills: 0 | Status: SHIPPED | OUTPATIENT
Start: 2023-08-09 | End: 2023-11-07

## 2023-08-09 ASSESSMENT — ENCOUNTER SYMPTOMS
CHEST TIGHTNESS: 0
SHORTNESS OF BREATH: 0
BLOOD IN STOOL: 0

## 2023-08-09 NOTE — PROGRESS NOTES
1. Have you been to the ER, urgent care clinic since your last visit? Hospitalized since your last visit? No    2. Have you seen or consulted any other health care providers outside of the [address] [place] [address] Avenue since your last visit? Include any pap smears or colon screening.  No      Chief Complaint   Patient presents with    Follow-up     X4 months
ENDOSCOPY, normal, repeat 5 yeasr    [address] (CERVIX STATUS UNKNOWN)      DE UNLISTED PROCEDURE CARDIAC SURGERY      cardioversion 11/16/2018    DE UNLISTED PROCEDURE CARDIAC SURGERY      ablation, 2016      No relevant family history has been documented for this patient.    Social History       Tobacco History       Smoking Status  Former      Smokeless Tobacco Use  Never              Alcohol History       Alcohol Use Status  No              Drug Use       Drug Use Status  No              Sexual Activity       Sexually Active  Not Asked                     Allergies     Allergies   Allergen Reactions    Ibuprofen Hives    Other Other (See Comments)     Pollen; runny nose and eyes with scratchy throat but not always per pt

## 2023-08-30 RX ORDER — AMLODIPINE BESYLATE 10 MG/1
TABLET ORAL
Qty: 90 TABLET | Refills: 3 | Status: SHIPPED | OUTPATIENT
Start: 2023-08-30

## 2023-08-30 RX ORDER — LOSARTAN POTASSIUM 100 MG/1
TABLET ORAL
Qty: 90 TABLET | Refills: 3 | Status: SHIPPED | OUTPATIENT
Start: 2023-08-30

## 2023-08-30 NOTE — TELEPHONE ENCOUNTER
PCP: Larry Aguilera MD    Last appt:  08/09/23  Last labs:  04/18/23        No future appointments.     Requested Prescriptions     Pending Prescriptions Disp Refills    amLODIPine (NORVASC) 10 MG tablet [Pharmacy Med Name: AMLODIPINE BESYLATE TABS 10MG] 90 tablet 3     Sig: TAKE 1 TABLET IN THE MORNING    losartan (COZAAR) 100 MG tablet [Pharmacy Med Name: LOSARTAN TABS 100MG] 90 tablet 3     Sig: TAKE 1 TABLET DAILY       Prior labs and Blood pressures:  BP Readings from Last 3 Encounters:   08/09/23 (!) 151/72   12/28/22 123/70   08/11/22 138/69     Lab Results   Component Value Date/Time     04/18/2023 09:23 AM    K 3.9 04/18/2023 09:23 AM     04/18/2023 09:23 AM    CO2 19 04/18/2023 09:23 AM    BUN 23 04/18/2023 09:23 AM    GFRAA 58 08/11/2022 11:53 AM     No results found for: HBA1C, TNY9DOIR  Lab Results   Component Value Date/Time    CHOL 216 04/18/2023 09:23 AM    HDL 59 04/18/2023 09:23 AM     No results found for: VITD3, VD3RIA    Lab Results   Component Value Date/Time    TSH 0.37 02/21/2022 03:15 PM

## 2023-09-11 RX ORDER — LEVOTHYROXINE SODIUM 137 UG/1
TABLET ORAL
Qty: 90 TABLET | Refills: 0 | Status: SHIPPED | OUTPATIENT
Start: 2023-09-11

## 2023-09-11 NOTE — TELEPHONE ENCOUNTER
PCP: Mely Adame MD    Last appt: [unfilled]  Patient was last see on 08/09/2023.    Future Appointments   Date Time Provider [address]   11/29/2023 10:00 AM Mely Adame MD [facility]       Requested Prescriptions     Pending Prescriptions Disp Refills    levothyroxine (SYNTHROID) 137 MCG tablet [Pharmacy Med Name: L-THYROXINE (SYNTHROID) TABS 137MCG] 90 tablet 3     Sig: TAKE 1 TABLET DAILY BEFORE BREAKFAST       Prior labs and Blood pressures:  BP Readings from Last 3 Encounters:   08/09/23 (!) 151/72   12/28/22 123/70   08/11/22 138/69     Lab Results   Component Value Date/Time     04/18/2023 09:23 AM    K 3.9 04/18/2023 09:23 AM     04/18/2023 09:23 AM    CO2 19 04/18/2023 09:23 AM    BUN 23 04/18/2023 09:23 AM    GFRAA 58 08/11/2022 11:53 AM     No results found for: \"HBA1C\", \"[id number]\"  Lab Results   Component Value Date/Time    CHOL 216 04/18/2023 09:23 AM    HDL 59 04/18/2023 09:23 AM     No results found for: \"VITD3\", \"VD3RIA\"    Lab Results   Component Value Date/Time    TSH 0.37 02/21/2022 03:15 PM

## 2023-11-09 ENCOUNTER — TELEPHONE (OUTPATIENT)
Facility: CLINIC | Age: 68
End: 2023-11-09

## 2023-11-09 ENCOUNTER — OFFICE VISIT (OUTPATIENT)
Facility: CLINIC | Age: 68
End: 2023-11-09
Payer: MEDICARE

## 2023-11-09 VITALS
TEMPERATURE: 97.3 F | RESPIRATION RATE: 20 BRPM | SYSTOLIC BLOOD PRESSURE: 124 MMHG | HEIGHT: 68 IN | HEART RATE: 70 BPM | BODY MASS INDEX: 44.41 KG/M2 | DIASTOLIC BLOOD PRESSURE: 69 MMHG | OXYGEN SATURATION: 98 % | WEIGHT: 293 LBS

## 2023-11-09 DIAGNOSIS — E66.01 MORBID (SEVERE) OBESITY DUE TO EXCESS CALORIES (HCC): ICD-10-CM

## 2023-11-09 DIAGNOSIS — I20.9 ANGINA PECTORIS, UNSPECIFIED (HCC): Primary | ICD-10-CM

## 2023-11-09 DIAGNOSIS — E03.9 ACQUIRED HYPOTHYROIDISM: ICD-10-CM

## 2023-11-09 DIAGNOSIS — L30.4 INTERTRIGO: ICD-10-CM

## 2023-11-09 DIAGNOSIS — I10 ESSENTIAL (PRIMARY) HYPERTENSION: ICD-10-CM

## 2023-11-09 DIAGNOSIS — J01.90 ACUTE NON-RECURRENT SINUSITIS, UNSPECIFIED LOCATION: ICD-10-CM

## 2023-11-09 DIAGNOSIS — M19.91 PRIMARY OSTEOARTHRITIS, UNSPECIFIED SITE: ICD-10-CM

## 2023-11-09 PROCEDURE — G8400 PT W/DXA NO RESULTS DOC: HCPCS | Performed by: FAMILY MEDICINE

## 2023-11-09 PROCEDURE — 1090F PRES/ABSN URINE INCON ASSESS: CPT | Performed by: FAMILY MEDICINE

## 2023-11-09 PROCEDURE — G8427 DOCREV CUR MEDS BY ELIG CLIN: HCPCS | Performed by: FAMILY MEDICINE

## 2023-11-09 PROCEDURE — G8417 CALC BMI ABV UP PARAM F/U: HCPCS | Performed by: FAMILY MEDICINE

## 2023-11-09 PROCEDURE — 4004F PT TOBACCO SCREEN RCVD TLK: CPT | Performed by: FAMILY MEDICINE

## 2023-11-09 PROCEDURE — 3078F DIAST BP <80 MM HG: CPT | Performed by: FAMILY MEDICINE

## 2023-11-09 PROCEDURE — 1123F ACP DISCUSS/DSCN MKR DOCD: CPT | Performed by: FAMILY MEDICINE

## 2023-11-09 PROCEDURE — 3017F COLORECTAL CA SCREEN DOC REV: CPT | Performed by: FAMILY MEDICINE

## 2023-11-09 PROCEDURE — G8484 FLU IMMUNIZE NO ADMIN: HCPCS | Performed by: FAMILY MEDICINE

## 2023-11-09 PROCEDURE — 99214 OFFICE O/P EST MOD 30 MIN: CPT | Performed by: FAMILY MEDICINE

## 2023-11-09 PROCEDURE — 3074F SYST BP LT 130 MM HG: CPT | Performed by: FAMILY MEDICINE

## 2023-11-09 RX ORDER — TRAMADOL HYDROCHLORIDE 50 MG/1
50 TABLET ORAL EVERY 4 HOURS PRN
Qty: 30 TABLET | Refills: 0 | Status: SHIPPED | OUTPATIENT
Start: 2024-01-09 | End: 2024-02-08

## 2023-11-09 RX ORDER — METOPROLOL SUCCINATE 50 MG/1
50 TABLET, EXTENDED RELEASE ORAL DAILY
COMMUNITY

## 2023-11-09 RX ORDER — AZITHROMYCIN 250 MG/1
250 TABLET, FILM COATED ORAL SEE ADMIN INSTRUCTIONS
Qty: 6 TABLET | Refills: 0 | Status: SHIPPED | OUTPATIENT
Start: 2023-11-09 | End: 2023-11-14

## 2023-11-09 RX ORDER — TRAMADOL HYDROCHLORIDE 50 MG/1
TABLET ORAL NIGHTLY PRN
COMMUNITY
Start: 2023-05-09 | End: 2023-11-09 | Stop reason: SDUPTHER

## 2023-11-09 RX ORDER — LEVOTHYROXINE SODIUM 137 UG/1
TABLET ORAL
Qty: 90 TABLET | Refills: 1 | Status: SHIPPED | OUTPATIENT
Start: 2023-11-09

## 2023-11-09 RX ORDER — MONTELUKAST SODIUM 10 MG/1
10 TABLET ORAL DAILY
Qty: 30 TABLET | Refills: 2 | Status: SHIPPED | OUTPATIENT
Start: 2023-11-09

## 2023-11-09 RX ORDER — ISOSORBIDE MONONITRATE 30 MG/1
30 TABLET, EXTENDED RELEASE ORAL EVERY MORNING
Qty: 90 TABLET | Refills: 1 | Status: SHIPPED | OUTPATIENT
Start: 2023-11-09

## 2023-11-09 RX ORDER — TRAMADOL HYDROCHLORIDE 50 MG/1
50 TABLET ORAL EVERY 4 HOURS PRN
Qty: 30 TABLET | Refills: 0 | Status: SHIPPED | OUTPATIENT
Start: 2023-12-09 | End: 2024-01-08

## 2023-11-09 RX ORDER — KETOCONAZOLE 20 MG/G
CREAM TOPICAL
Qty: 30 G | Refills: 1 | Status: SHIPPED | OUTPATIENT
Start: 2023-11-09

## 2023-11-09 RX ORDER — TOPIRAMATE 50 MG/1
100 TABLET, FILM COATED ORAL NIGHTLY
Qty: 180 TABLET | Refills: 1 | Status: SHIPPED | OUTPATIENT
Start: 2023-11-09

## 2023-11-09 RX ORDER — TRAMADOL HYDROCHLORIDE 50 MG/1
50 TABLET ORAL EVERY 8 HOURS PRN
Qty: 30 TABLET | Refills: 0 | Status: SHIPPED | OUTPATIENT
Start: 2023-11-09 | End: 2023-12-09

## 2023-11-09 ASSESSMENT — PATIENT HEALTH QUESTIONNAIRE - PHQ9
SUM OF ALL RESPONSES TO PHQ QUESTIONS 1-9: 0
1. LITTLE INTEREST OR PLEASURE IN DOING THINGS: 0
SUM OF ALL RESPONSES TO PHQ QUESTIONS 1-9: 0
2. FEELING DOWN, DEPRESSED OR HOPELESS: 0
SUM OF ALL RESPONSES TO PHQ QUESTIONS 1-9: 0
SUM OF ALL RESPONSES TO PHQ QUESTIONS 1-9: 0
SUM OF ALL RESPONSES TO PHQ9 QUESTIONS 1 & 2: 0

## 2023-11-09 ASSESSMENT — ENCOUNTER SYMPTOMS: SHORTNESS OF BREATH: 0

## 2023-11-09 NOTE — PROGRESS NOTES
Susie Esparza  76 y.o. female  1955  [id number]  [facility]  Progress Note     Encounter Date: 11/9/2023    Assessment and Plan:       ICD-10-CM    1. Angina pectoris, unspecified ([address])  I20.9 isosorbide mononitrate (IMDUR) 30 MG extended release tablet      2. Morbid (severe) obesity due to excess calories (HCC)  E66.01 topiramate (TOPAMAX) 50 MG tablet      3. Acquired hypothyroidism  E03.9 levothyroxine (SYNTHROID) 137 MCG tablet      4. Primary osteoarthritis, unspecified site  M19.91 traMADol (ULTRAM) 50 MG tablet     traMADol (ULTRAM) 50 MG tablet     traMADol (ULTRAM) 50 MG tablet      5. Intertrigo  L30.4 ketoconazole (NIZORAL) 2 % cream      6. Acute non-recurrent sinusitis, unspecified location  J01.90 azithromycin (ZITHROMAX) 250 MG tablet     montelukast (SINGULAIR) 10 MG tablet        1. Angina pectoris, unspecified ([address])  Refilled meds. Free of chest pain  -     isosorbide mononitrate (IMDUR) 30 MG extended release tablet; Take 1 tablet by mouth every morning, Disp-90 tablet, R-1Normal  2. Morbid (severe) obesity due to excess calories ([address])  Has gained back weight  Gets depressed this time of year and gains weight  Try to get back to diet  -     topiramate (TOPAMAX) 50 MG tablet; Take 2 tablets by mouth nightly, Disp-180 tablet, R-1Normal  3. Acquired hypothyroidism  Needs TSH  -     levothyroxine (SYNTHROID) 137 MCG tablet; TAKE 1 TABLET DAILY BEFORE BREAKFAST, Disp-90 tablet, R-1Normal  4. Primary osteoarthritis, unspecified site  Severe DJD unrelieved by Tylenol  Cannot take NSAID's due to Xarelto use  Single tramadol at bedtime for pain that prevents sleep  -     traMADol (ULTRAM) 50 MG tablet; Take 1 tablet by mouth every 8 hours as needed for Pain for up to 30 days. Max Daily Amount: 150 mg, Disp-30 tablet, R-0Normal  -     traMADol (ULTRAM) 50 MG tablet; Take 1 tablet by mouth every 4 hours as needed for Pain for up to 30 days. Intended supply: 3 days.  Take

## 2023-11-10 ENCOUNTER — TELEPHONE (OUTPATIENT)
Age: 68
End: 2023-11-10

## 2023-11-14 DIAGNOSIS — E66.01 MORBID (SEVERE) OBESITY DUE TO EXCESS CALORIES (HCC): ICD-10-CM

## 2023-11-14 RX ORDER — TOPIRAMATE 50 MG/1
100 TABLET, FILM COATED ORAL NIGHTLY
Qty: 180 TABLET | Refills: 1 | Status: SHIPPED | OUTPATIENT
Start: 2023-11-14

## 2023-11-30 NOTE — PATIENT INSTRUCTIONS
Diabetes: 
Blood sugar goals: 
Hemoglobin A1c under 7 Fasting blood sugar  Blood sugar 2 hours after a meal under 180, 4 hours after a meal under 120 No hypoglycemia (sugars under 70 and symptomatic low sugars) Blood sugar control with diet and exercise: 
Exercise 45 minutes per day. This makes your insulin work better. It also allows insulin levels to fall helping with weight loss. Every night, try to fast from your evening meal to breakfast (at least 12 hours) without eating anything. This uses stored energy in your liver and makes insulin work better. Avoid simples sugars such as table sugar in drinks (sodas, lemonade, sweet tea, wine), desserts, candy. Also avoid fruit juices and high fructose corn syrup. Avoid frequent consumption of fruit especially grapes and bananas. A single serving of fruit daily is all you should have. Finally, drink less milk which has milk sugar in it. Control your starch intake. Men should have 3-4 carb portions per meal.  Women should have 2-3 carb portions per meal.  A carb serving is the equivalent of a slice of bread. Control your blood pressure and cholesterol. These problems are common in diabetes. AND, don't smoke. All of these problems contribute to heart disease and stroke risk. Get a yearly eye exam and flu shot. Make sure your vaccines are up to date particularly the pneumonia vaccines. Inspect your feet daily. Wear comfortable protective shoes and clean socks. Seek medical care if there are sore, calluses or numbness and burning of your feet. See your doctor at least every 6 months if you are on oral medicines or more often if the diabetic control is not at goal or if you are on insulin. Take your medicines religiously. STOP the SUGAR The first step in dietary efforts at weight loss is removing as much sugar from the diet as possible.   Most dietary sugar is in the forms of table sugar (sucrose), fruit sugar (fructose) and milk sugar (lactose). But, you need to watch labels to see if processed foods have added sugars under other names. To eliminate sucrose, eliminate sweet drinks entirely including soft drinks, sports drinks, lemonade, sweet tea and wine. Also, eliminate candy and make desserts a \"special occasion only treat\". Don't eat desserts with every meal or every night. Most fructose is found in fruit and this should be markedly limited. Fruit juice should be avoided. If you do eat fruit, eat fruits that are lower in sugar such as: strawberries, blackberries, raspberries, lemon, grapefruit and watermelon. Eat small portions and think of the fruit as a garnish or small treat. Bananas, mangos, cherries and grapes are higher in sugar and should be avoided. Avoid high fructose corn syrup (an artificial sweetener). Milk sugar, or lactose, should be avoided as well. Milk is a good source of calcium but not for people struggling with weight issues. Put a little cream in your coffee or tea but otherwise avoid milk. How can you avoid sugar? For starters, don't buy it and don't bring it in the house. It won't tempt you that way! For more information: 
 
Try the internet for videos about sugar addiction or try diet [email or web address]. FASTING Fasting is often a useful tool for weight loss, fatty liver and improving control of diabetes type 2. Intermittent fasting may work better for patients than overall calorie reduction for several reasons. First, fasting does not cause a reduction in your metabolic rate which may occur with overall caloric restriction. (If you reduce your metabolic rate, you do not burn as many calories and that undermines your ability to lose weight.)  Second, when you fast you burn stored calories in your liver and elsewhere. Excessive stored calories in the form of hepatic fat are a major  of insulin resistance in diabetes.  
 
Who should exercise extreme caution with fasting? Diabetics on insulin and other medication for diabetes that can cause hypoglycemia should only fast under medical supervision. Fasting may still help them but it is important to avoid hypoglycemia which can be life threatening. So, medical supervision and medication adjustment for these patients is necessary. Is fasting dangerous? No, fasting is not dangerous if it is done with a reasonable amount of common sense. Many Oriental orthodox groups fast regularly. Adventist families may fast weekly. Islamic families fast during Ramadan. Buddhists and Christians sometimes fast as well. There is no evidence that it harms them. The average teenager can sleep until noon and in effect fasts the entire time they are asleep. How do I go about fasting? Sometimes we make it too hard so here are some basic kinds of fasting and how to do them. Other general recommendations follow. 12 Hour Fast- 
Step one-eat a regular evening meal and finish before 7 PM. Step two-do not snack before bedtime. Step three-go to bed. Step four-when you wake up, don't eat breakfast before 7 AM. Congratulations, you have fasted twelve hours. 16 Hour Fast 
Step one-eat a regular evening meal and finish before 7 PM 
Step two-do not snack before bedtime. Step three-go to bed Step four-when you wake up, don't eat until lunch time at 11-12 AM 
Congratulations, you have fasted 16 hours. 24 Hour Fast 
Step one-eat a regular evening meal and finish before 7 PM. Step two-do not snack before bedtime. Step three-go to bed. Step four-when you wake up, don't eat until your next evening meal at 6-7 PM 
Congratulations, you have fasted 24 hours. During fasting, you should stay well hydrated. Drink water or unsweetened tea or coffee. Do not drink sweet drinks such as juices or sodas. When you do resume eating, stick to your diet. No sweet drinks, no desserts, no candy. Pay attention to your body and do NOT overeat. Eat normal, solid meals and do not snack. Try not to snack in general.  If you do not snack, you are in effect fasting between meals. Whenever you fast, you are burning stored energy. How should I incorporate fasting into my weight loss plan or diabetic care? Again, diabetics on insulin or other drugs that can cause low sugars should only fast under medical supervision. A reasonable expectation would be for all type 2 diabetics to fast 12 hours every night. For additional results, talk to your doctor about longer fasts and how often they should do so. [facility]

## 2023-12-08 DIAGNOSIS — E03.9 ACQUIRED HYPOTHYROIDISM: ICD-10-CM

## 2023-12-08 DIAGNOSIS — I10 ESSENTIAL (PRIMARY) HYPERTENSION: ICD-10-CM

## 2023-12-09 LAB
ANION GAP SERPL CALC-SCNC: 6 MMOL/L (ref 5–15)
BUN SERPL-MCNC: 23 MG/DL (ref 6–20)
BUN/CREAT SERPL: 19 (ref 12–20)
CALCIUM SERPL-MCNC: 8.6 MG/DL (ref 8.5–10.1)
CHLORIDE SERPL-SCNC: 113 MMOL/L (ref 97–108)
CO2 SERPL-SCNC: 22 MMOL/L (ref 21–32)
CREAT SERPL-MCNC: 1.24 MG/DL (ref 0.55–1.02)
GLUCOSE SERPL-MCNC: 91 MG/DL (ref 65–100)
POTASSIUM SERPL-SCNC: 4.7 MMOL/L (ref 3.5–5.1)
SODIUM SERPL-SCNC: 141 MMOL/L (ref 136–145)
TSH SERPL DL<=0.05 MIU/L-ACNC: 0.33 UIU/ML (ref 0.36–3.74)

## 2023-12-11 RX ORDER — AMIODARONE HYDROCHLORIDE 200 MG/1
200 TABLET ORAL DAILY
Qty: 90 TABLET | Refills: 1 | Status: SHIPPED | OUTPATIENT
Start: 2023-12-11

## 2023-12-11 NOTE — TELEPHONE ENCOUNTER
PCP: Shivani Contreras MD    Last appt: [unfilled]  No future appointments.     Requested Prescriptions     Pending Prescriptions Disp Refills    amiodarone (CORDARONE) 200 MG tablet [Pharmacy Med Name: AMIODARONE HCL TABS 200MG] 90 tablet 3     Sig: TAKE 1 TABLET DAILY       Prior labs and Blood pressures:  BP Readings from Last 3 Encounters:   11/09/23 124/69   08/09/23 (!) 151/72   12/28/22 123/70     Lab Results   Component Value Date/Time     12/08/2023 10:18 AM    K 4.7 12/08/2023 10:18 AM     12/08/2023 10:18 AM    CO2 22 12/08/2023 10:18 AM    BUN 23 12/08/2023 10:18 AM    GFRAA 58 08/11/2022 11:53 AM     No results found for: \"HBA1C\", \"HGL8UGRT\"  Lab Results   Component Value Date/Time    CHOL 216 04/18/2023 09:23 AM    HDL 59 04/18/2023 09:23 AM     No results found for: \"VITD3\", \"VD3RIA\"    Lab Results   Component Value Date/Time    TSH 0.37 02/21/2022 03:15 PM

## 2023-12-26 RX ORDER — RIVAROXABAN 20 MG/1
20 TABLET, FILM COATED ORAL DAILY
Qty: 90 TABLET | Refills: 1 | Status: SHIPPED | OUTPATIENT
Start: 2023-12-26

## 2023-12-26 NOTE — TELEPHONE ENCOUNTER
PCP: Clarinda Kussmaul, MD    Last appt: [unfilled]  No future appointments.     Requested Prescriptions     Pending Prescriptions Disp Refills    XARELTO 20 MG TABS tablet [Pharmacy Med Name: Aisha Sheikh 20 MG TABLET] 90 tablet 1     Sig: TAKE 1 TABLET BY MOUTH EVERY DAY       Prior labs and Blood pressures:  BP Readings from Last 3 Encounters:   11/09/23 124/69   08/09/23 (!) 151/72   12/28/22 123/70     Lab Results   Component Value Date/Time     12/08/2023 10:18 AM    K 4.7 12/08/2023 10:18 AM     12/08/2023 10:18 AM    CO2 22 12/08/2023 10:18 AM    BUN 23 12/08/2023 10:18 AM    GFRAA 58 08/11/2022 11:53 AM     No results found for: \"HBA1C\", \"[id number]\"  Lab Results   Component Value Date/Time    CHOL 216 04/18/2023 09:23 AM    HDL 59 04/18/2023 09:23 AM     No results found for: \"VITD3\", \"VD3RIA\"    Lab Results   Component Value Date/Time    TSH 0.37 02/21/2022 03:15 PM

## 2024-01-19 ENCOUNTER — TELEPHONE (OUTPATIENT)
Facility: CLINIC | Age: 69
End: 2024-01-19

## 2024-01-23 ENCOUNTER — PATIENT MESSAGE (OUTPATIENT)
Facility: CLINIC | Age: 69
End: 2024-01-23

## 2024-01-25 NOTE — TELEPHONE ENCOUNTER
Called and offered appt for Monday 1/29/24 3:20 with Dr Flowers    Patient verbally agreed to appt was told if symptoms get worst to go to urgent care

## 2024-01-25 NOTE — TELEPHONE ENCOUNTER
From: Zabrina Morgan  To: Dr. Julio C Flowers  Sent: 1/23/2024 7:53 PM EST  Subject: A small sore on my breast.    I have a small sore that pops up on my right breast it's red around the outside with a scab on it. It stays about a week then goes away, only to return in 24 hours the same way. Sometimes it itches just a little. I also think I may have a kidney infection or a urinary infection not real sure. Some discomfort when I go to the bathroom.      Thanks Zabrina Morgan

## 2024-01-29 ENCOUNTER — OFFICE VISIT (OUTPATIENT)
Facility: CLINIC | Age: 69
End: 2024-01-29
Payer: MEDICARE

## 2024-01-29 VITALS
SYSTOLIC BLOOD PRESSURE: 122 MMHG | OXYGEN SATURATION: 98 % | HEIGHT: 68 IN | RESPIRATION RATE: 16 BRPM | HEART RATE: 77 BPM | BODY MASS INDEX: 44.41 KG/M2 | TEMPERATURE: 98.1 F | WEIGHT: 293 LBS | DIASTOLIC BLOOD PRESSURE: 78 MMHG

## 2024-01-29 DIAGNOSIS — E66.01 MORBID (SEVERE) OBESITY DUE TO EXCESS CALORIES (HCC): ICD-10-CM

## 2024-01-29 DIAGNOSIS — E11.21 TYPE 2 DIABETES MELLITUS WITH DIABETIC NEPHROPATHY, WITHOUT LONG-TERM CURRENT USE OF INSULIN (HCC): ICD-10-CM

## 2024-01-29 DIAGNOSIS — R35.0 URINE FREQUENCY: Primary | ICD-10-CM

## 2024-01-29 DIAGNOSIS — I48.20 CHRONIC ATRIAL FIBRILLATION, UNSPECIFIED (HCC): ICD-10-CM

## 2024-01-29 DIAGNOSIS — L02.92 FURUNCLE: ICD-10-CM

## 2024-01-29 DIAGNOSIS — I10 ESSENTIAL (PRIMARY) HYPERTENSION: ICD-10-CM

## 2024-01-29 LAB
BILIRUBIN, URINE, POC: NORMAL
BLOOD URINE, POC: NORMAL
GLUCOSE URINE, POC: NEGATIVE
KETONES, URINE, POC: NEGATIVE
LEUKOCYTE ESTERASE, URINE, POC: NORMAL
NITRITE, URINE, POC: NEGATIVE
PH, URINE, POC: 5.5 (ref 4.6–8)
PROTEIN,URINE, POC: 100
SPECIFIC GRAVITY, URINE, POC: 1.03 (ref 1–1.03)
URINALYSIS CLARITY, POC: NORMAL
URINALYSIS COLOR, POC: YELLOW
UROBILINOGEN, POC: NORMAL

## 2024-01-29 PROCEDURE — 1123F ACP DISCUSS/DSCN MKR DOCD: CPT | Performed by: FAMILY MEDICINE

## 2024-01-29 PROCEDURE — 3074F SYST BP LT 130 MM HG: CPT | Performed by: FAMILY MEDICINE

## 2024-01-29 PROCEDURE — G8427 DOCREV CUR MEDS BY ELIG CLIN: HCPCS | Performed by: FAMILY MEDICINE

## 2024-01-29 PROCEDURE — G8484 FLU IMMUNIZE NO ADMIN: HCPCS | Performed by: FAMILY MEDICINE

## 2024-01-29 PROCEDURE — G8417 CALC BMI ABV UP PARAM F/U: HCPCS | Performed by: FAMILY MEDICINE

## 2024-01-29 PROCEDURE — 1090F PRES/ABSN URINE INCON ASSESS: CPT | Performed by: FAMILY MEDICINE

## 2024-01-29 PROCEDURE — 3017F COLORECTAL CA SCREEN DOC REV: CPT | Performed by: FAMILY MEDICINE

## 2024-01-29 PROCEDURE — 99213 OFFICE O/P EST LOW 20 MIN: CPT | Performed by: FAMILY MEDICINE

## 2024-01-29 PROCEDURE — 2022F DILAT RTA XM EVC RTNOPTHY: CPT | Performed by: FAMILY MEDICINE

## 2024-01-29 PROCEDURE — 81003 URINALYSIS AUTO W/O SCOPE: CPT | Performed by: FAMILY MEDICINE

## 2024-01-29 PROCEDURE — 1036F TOBACCO NON-USER: CPT | Performed by: FAMILY MEDICINE

## 2024-01-29 PROCEDURE — 3078F DIAST BP <80 MM HG: CPT | Performed by: FAMILY MEDICINE

## 2024-01-29 PROCEDURE — G8400 PT W/DXA NO RESULTS DOC: HCPCS | Performed by: FAMILY MEDICINE

## 2024-01-29 PROCEDURE — 3046F HEMOGLOBIN A1C LEVEL >9.0%: CPT | Performed by: FAMILY MEDICINE

## 2024-01-29 RX ORDER — TOPIRAMATE 50 MG/1
100 TABLET, FILM COATED ORAL NIGHTLY
Qty: 180 TABLET | Refills: 1
Start: 2024-01-29

## 2024-01-29 ASSESSMENT — ENCOUNTER SYMPTOMS: SHORTNESS OF BREATH: 0

## 2024-01-29 NOTE — PROGRESS NOTES
Zabrina Morgan  69 y.o. female  1955  MRN:[id number]  [facility]  Progress Note     Encounter Date: 1/29/2024    Assessment and Plan:       ICD-10-CM    1. Urine frequency  R35.0 AMB POC URINALYSIS DIP STICK AUTO W/O MICRO      2. Furuncle  L02.92 mupirocin (BACTROBAN) 2 % ointment      3. Essential (primary) hypertension  I10       4. Type 2 diabetes mellitus with diabetic nephropathy, without long-term current use of insulin (HCC)  E11.21       5. Chronic atrial fibrillation, unspecified (HCC)  I48.20       6. Morbid (severe) obesity due to excess calories ([facility])  E66.01 topiramate (TOPAMAX) 50 MG tablet        1. Urine frequency  Relatively normal urine.  FU if sx progress  -     AMB POC URINALYSIS DIP STICK AUTO W/O MICRO  2. Furuncle  Trial of bactroban  -     mupirocin (BACTROBAN) 2 % ointment; Apply topically 3 times daily., Disp-1 each, R-1, Normal  3. Essential (primary) hypertension  At goal  4. Type 2 diabetes mellitus with diabetic nephropathy, without long-term current use of insulin (HCC)  Last several A1c's normal  5. Chronic atrial fibrillation, unspecified (HCC)  Remains on amio and xarelto  6. Morbid (severe) obesity due to excess calories (HCC)  Cannot afford GLP's  Needs to lose at least to BMI of 40 to get TKR's  -     topiramate (TOPAMAX) 50 MG tablet; Take 2 tablets by mouth nightly, Disp-180 tablet, R-1NO PRINT       I have discussed the diagnosis with the patient and the intended plan as seen in the above orders.  she has expressed understanding.  The patient has received an after-visit summary and questions were answered concerning future plans.  I have discussed medication side effects and warnings with the patient as well.    Electronically Signed: Julio C Flowers MD    Current Medications after this visit     Current Outpatient Medications   Medication Sig    topiramate (TOPAMAX) 50 MG tablet Take 2 tablets by mouth nightly    mupirocin (BACTROBAN) 2 %

## 2024-01-29 NOTE — PATIENT INSTRUCTIONS
The essentials of losing weight and a diabetic lower carbohydrate diet.    Exercise  You should exercise 45- 60 minutes every day.  This reduces the stored energy in your muscles and allows your insulin level to fall.  You can only lose weight when your insulin level is low.  Then you burn stored energy.    2.  Fasting   a.  You should fast every night from 12-14 hours.   b.  You are still using energy at night when you are sleeping and will burn stored energy.   c.  Fasting allows you burn stored energy in your internal organs such as the liver.  This allows the insulin level to drop.   d.  This allows you to start losing weight.    3.  No sugar!   a.  You should try to eliminate sugar from your diet.   b.  First, eliminate sweet drinks including:  sodas and can, sports drinks (like Gatorade or Poweraid), sweet tea and lemonade, wine (and beer), fruit juice (contains fruit sugar) and milk (contains milk sugar).   c.  Eliminate sugary cereals, cookies and candies.  No donuts, pastries or coffee cake.   d.  Eat desserts only on special occasions:  family reunions, birthdays, anniversaries, major holidays.  Eat only small desserts!   e.  Start watching labels for foods that have added sugars.    4. Limit starches   a.  Limit starches particularly:  bread, noodles, pasta, crackers and chips, rice, potatoes and fries.   b.  Watch out for starchy vegetables:  corn and peas.   c.  Women can have 30-45 grams of carbs per meal   d.  Men can have 45-60 grams of carbs per meal   e.  One piece of bread has 15-20 grams of carbs   f.  One half cup of oatmeal, corn, rice, peas and cooked pasta have about 15 grams of carbs.    5.  Fruit-special case   a.  Fruit has nutrients we need such as Vitamin C but also contains a lot of fruit sugar (fructose)   b.  Fruit is not a good snack because fructose does not suppress your appetite.   c.  Fruit can cause progression of fatty liver disease which makes weight loss harder   d.  Limit

## 2024-01-29 NOTE — PROGRESS NOTES
Rm    Chief Complaint   Patient presents with    Abrasion     Sore on breast which comes up scab will form, fall off.  Bleeding then scab forms again.  No pain.    Urinary Tract Infection     Patient states some days she goes very frequently and other days nothing.         /78   Pulse 77   Temp 98.1 °F (36.7 °C)   Resp 16   Ht 1.727 m (5' 8\")   Wt 134 kg (295 lb 6.4 oz)   SpO2 98%   BMI 44.92 kg/m²      1. Have you been to the ER, urgent care clinic since your last visit?  Hospitalized since your last visit? no    2. Have you seen or consulted any other health care providers outside of the [facility] since your last visit?  Include any pap smears or colon screening.  no    Health Maintenance Due   Topic Date Due    DEXA (modify frequency per FRAX score)  Never done    Respiratory Syncytial Virus (RSV) Pregnant or age 60 yrs+ (1 - 1-dose 60+ series) Never done    DTaP/Tdap/Td vaccine (1 - Tdap) 10/19/2018    Shingles vaccine (3 of 3) 09/24/2019    Diabetic retinal exam  09/18/2020    COVID-19 Vaccine (2 - 2023-24 season) 09/01/2023    Colorectal Cancer Screen  12/04/2023             No data to display

## 2024-04-16 ENCOUNTER — TELEPHONE (OUTPATIENT)
Facility: CLINIC | Age: 69
End: 2024-04-16

## 2024-04-16 NOTE — TELEPHONE ENCOUNTER
Patient called with complaint of knot above the right knee,patient did state she had a fall a couple of weeks ago. There are no avail appointment to see patient patient has  been referred to the [facility]gent care, patient refused.

## 2024-05-25 ASSESSMENT — PATIENT HEALTH QUESTIONNAIRE - PHQ9
SUM OF ALL RESPONSES TO PHQ9 QUESTIONS 1 & 2: 2
SUM OF ALL RESPONSES TO PHQ QUESTIONS 1-9: 2
2. FEELING DOWN, DEPRESSED OR HOPELESS: NOT AT ALL
1. LITTLE INTEREST OR PLEASURE IN DOING THINGS: MORE THAN HALF THE DAYS
SUM OF ALL RESPONSES TO PHQ QUESTIONS 1-9: 2

## 2024-05-25 ASSESSMENT — LIFESTYLE VARIABLES
HOW MANY STANDARD DRINKS CONTAINING ALCOHOL DO YOU HAVE ON A TYPICAL DAY: PATIENT DOES NOT DRINK
HOW MANY STANDARD DRINKS CONTAINING ALCOHOL DO YOU HAVE ON A TYPICAL DAY: 0
HOW OFTEN DO YOU HAVE SIX OR MORE DRINKS ON ONE OCCASION: 1

## 2024-05-28 ENCOUNTER — OFFICE VISIT (OUTPATIENT)
Facility: CLINIC | Age: 69
End: 2024-05-28
Payer: MEDICARE

## 2024-05-28 VITALS
HEIGHT: 68 IN | HEART RATE: 70 BPM | WEIGHT: 293 LBS | DIASTOLIC BLOOD PRESSURE: 59 MMHG | SYSTOLIC BLOOD PRESSURE: 120 MMHG | RESPIRATION RATE: 18 BRPM | BODY MASS INDEX: 44.41 KG/M2 | OXYGEN SATURATION: 97 % | TEMPERATURE: 98 F

## 2024-05-28 DIAGNOSIS — E11.21 TYPE 2 DIABETES MELLITUS WITH DIABETIC NEPHROPATHY, WITHOUT LONG-TERM CURRENT USE OF INSULIN (HCC): ICD-10-CM

## 2024-05-28 DIAGNOSIS — I48.20 CHRONIC ATRIAL FIBRILLATION, UNSPECIFIED (HCC): ICD-10-CM

## 2024-05-28 DIAGNOSIS — Z12.31 ENCOUNTER FOR SCREENING MAMMOGRAM FOR MALIGNANT NEOPLASM OF BREAST: ICD-10-CM

## 2024-05-28 DIAGNOSIS — H91.93 BILATERAL HEARING LOSS, UNSPECIFIED HEARING LOSS TYPE: ICD-10-CM

## 2024-05-28 DIAGNOSIS — Z00.00 MEDICARE ANNUAL WELLNESS VISIT, SUBSEQUENT: Primary | ICD-10-CM

## 2024-05-28 DIAGNOSIS — N18.31 STAGE 3A CHRONIC KIDNEY DISEASE (HCC): ICD-10-CM

## 2024-05-28 DIAGNOSIS — I10 ESSENTIAL (PRIMARY) HYPERTENSION: ICD-10-CM

## 2024-05-28 DIAGNOSIS — E03.9 ACQUIRED HYPOTHYROIDISM: ICD-10-CM

## 2024-05-28 DIAGNOSIS — Z12.11 SCREENING FOR COLON CANCER: ICD-10-CM

## 2024-05-28 DIAGNOSIS — I20.9 ANGINA PECTORIS, UNSPECIFIED (HCC): ICD-10-CM

## 2024-05-28 PROCEDURE — G8427 DOCREV CUR MEDS BY ELIG CLIN: HCPCS | Performed by: FAMILY MEDICINE

## 2024-05-28 PROCEDURE — G8400 PT W/DXA NO RESULTS DOC: HCPCS | Performed by: FAMILY MEDICINE

## 2024-05-28 PROCEDURE — 1090F PRES/ABSN URINE INCON ASSESS: CPT | Performed by: FAMILY MEDICINE

## 2024-05-28 PROCEDURE — 3046F HEMOGLOBIN A1C LEVEL >9.0%: CPT | Performed by: FAMILY MEDICINE

## 2024-05-28 PROCEDURE — G8417 CALC BMI ABV UP PARAM F/U: HCPCS | Performed by: FAMILY MEDICINE

## 2024-05-28 PROCEDURE — 99214 OFFICE O/P EST MOD 30 MIN: CPT | Performed by: FAMILY MEDICINE

## 2024-05-28 PROCEDURE — 3078F DIAST BP <80 MM HG: CPT | Performed by: FAMILY MEDICINE

## 2024-05-28 PROCEDURE — 3074F SYST BP LT 130 MM HG: CPT | Performed by: FAMILY MEDICINE

## 2024-05-28 PROCEDURE — 2022F DILAT RTA XM EVC RTNOPTHY: CPT | Performed by: FAMILY MEDICINE

## 2024-05-28 PROCEDURE — G0438 PPPS, INITIAL VISIT: HCPCS | Performed by: FAMILY MEDICINE

## 2024-05-28 PROCEDURE — 3017F COLORECTAL CA SCREEN DOC REV: CPT | Performed by: FAMILY MEDICINE

## 2024-05-28 PROCEDURE — 1036F TOBACCO NON-USER: CPT | Performed by: FAMILY MEDICINE

## 2024-05-28 PROCEDURE — 1123F ACP DISCUSS/DSCN MKR DOCD: CPT | Performed by: FAMILY MEDICINE

## 2024-05-28 RX ORDER — LEVOTHYROXINE SODIUM 137 UG/1
TABLET ORAL
Qty: 90 TABLET | Refills: 1 | Status: SHIPPED | OUTPATIENT
Start: 2024-05-28

## 2024-05-28 RX ORDER — ISOSORBIDE MONONITRATE 30 MG/1
30 TABLET, EXTENDED RELEASE ORAL EVERY MORNING
Qty: 90 TABLET | Refills: 1 | Status: SHIPPED | OUTPATIENT
Start: 2024-05-28

## 2024-05-28 RX ORDER — AMIODARONE HYDROCHLORIDE 200 MG/1
200 TABLET ORAL DAILY
Qty: 90 TABLET | Refills: 1 | Status: SHIPPED | OUTPATIENT
Start: 2024-05-28

## 2024-05-28 RX ORDER — LOSARTAN POTASSIUM 100 MG/1
100 TABLET ORAL DAILY
Qty: 90 TABLET | Refills: 1 | Status: SHIPPED | OUTPATIENT
Start: 2024-05-28

## 2024-05-28 RX ORDER — AMLODIPINE BESYLATE 10 MG/1
10 TABLET ORAL EVERY MORNING
Qty: 90 TABLET | Refills: 1 | Status: SHIPPED | OUTPATIENT
Start: 2024-05-28

## 2024-05-28 ASSESSMENT — LIFESTYLE VARIABLES
HOW OFTEN DO YOU HAVE A DRINK CONTAINING ALCOHOL: NEVER
HOW MANY STANDARD DRINKS CONTAINING ALCOHOL DO YOU HAVE ON A TYPICAL DAY: PATIENT DOES NOT DRINK

## 2024-05-28 NOTE — PROGRESS NOTES
Identified patient with two patient identifiers (name and ). Reviewed chart in preparation for visit and have obtained necessary documentation.    Zabrina Morgan is a 69 y.o. female  No chief complaint on file.    BP (!) 120/59 (Site: Left Wrist, Position: Sitting, Cuff Size: Large Adult)   Pulse 70   Temp 98 °F (36.7 °C) (Oral)   Resp 18   Ht 1.727 m (5' 8\")   Wt (!) 138 kg (304 lb 4.8 oz)   SpO2 97%   BMI 46.27 kg/m²     1. Have you been to the ER, urgent care clinic since your last visit?  Hospitalized since your last visit?no    2. Have you seen or consulted any other health care providers outside of the [facility] since your last visit?  Include any pap smears or colon screening. no

## 2024-05-28 NOTE — PATIENT INSTRUCTIONS
disclaims any warranty or liability for your use of this information.      Personalized Preventive Plan for Zabrina Morgan - 5/28/2024  Medicare offers a range of preventive health benefits. Some of the tests and screenings are paid in full while other may be subject to a deductible, co-insurance, and/or copay.    Some of these benefits include a comprehensive review of your medical history including lifestyle, illnesses that may run in your family, and various assessments and screenings as appropriate.    After reviewing your medical record and screening and assessments performed today your provider may have ordered immunizations, labs, imaging, and/or referrals for you.  A list of these orders (if applicable) as well as your Preventive Care list are included within your After Visit Summary for your review.    Other Preventive Recommendations:    A preventive eye exam performed by an eye specialist is recommended every 1-2 years to screen for glaucoma; cataracts, macular degeneration, and other eye disorders.  A preventive dental visit is recommended every 6 months.  Try to get at least 150 minutes of exercise per week or 10,000 steps per day on a pedometer .  Order or download the FREE \"Exercise & Physical Activity: Your Everyday Guide\" from [employer] [employer]. Call 1-275.169.6180 or search [employer] [place] [employer] online.  You need 6408-8244 mg of calcium and 2317-1989 IU of vitamin D per day. It is possible to meet your calcium requirement with diet alone, but a vitamin D supplement is usually necessary to meet this goal.  When exposed to the sun, use a sunscreen that protects against both UVA and UVB radiation with an SPF of 30 or greater. Reapply every 2 to 3 hours or after sweating, drying off with a towel, or swimming.  Always wear a seat belt when traveling in a car. Always wear a helmet when riding a bicycle or motorcycle.

## 2024-05-28 NOTE — PROGRESS NOTES
Identified patient with two patient identifiers (name and ). Reviewed chart in preparation for visit and have obtained necessary documentation.    Zabrina Morgan is a 69 y.o. female  No chief complaint on file.    Resp 18   Ht 1.727 m (5' 8\")   Wt (!) 138 kg (304 lb 4.8 oz)   BMI 46.27 kg/m²     1. Have you been to the ER, urgent care clinic since your last visit?  Hospitalized since your last visit?no    2. Have you seen or consulted any other health care providers outside of the [facility] since your last visit?  Include any pap smears or colon screening. no

## 2024-05-28 NOTE — PROGRESS NOTES
Zabrina Morgan  69 y.o. female  1955  MRN:[id number]  [facility]  Progress Note     Encounter Date: 5/28/2024    Assessment and Plan:       ICD-10-CM    1. Medicare annual wellness visit, subsequent  Z00.00       2. Encounter for screening mammogram for malignant neoplasm of breast  Z12.31 JENNIFER DIGITAL SCREEN W OR WO CAD BILATERAL      3. Screening for colon cancer  Z12.11 Cologuard (Fecal DNA Colorectal Cancer Screening)     Cologuard (Fecal DNA Colorectal Cancer Screening)      4. Chronic atrial fibrillation, unspecified (HCC)  I48.20 rivaroxaban (XARELTO) 20 MG TABS tablet     amiodarone (CORDARONE) 200 MG tablet      5. Essential (primary) hypertension  I10 losartan (COZAAR) 100 MG tablet     amLODIPine (NORVASC) 10 MG tablet     Basic Metabolic Panel     Microalbumin / Creatinine Urine Ratio      6. Stage 3a chronic kidney disease (HCC)  N18.31       7. Angina pectoris, unspecified (HCC)  I20.9 isosorbide mononitrate (IMDUR) 30 MG extended release tablet      8. Type 2 diabetes mellitus with diabetic nephropathy, without long-term current use of insulin (HCC)  E11.21 CBC with Auto Differential      DIABETES FOOT EXAM     Hemoglobin A1C     Lipid Panel     Hepatic Function Panel      9. Acquired hypothyroidism  E03.9 levothyroxine (SYNTHROID) 137 MCG tablet     TSH      10. Bilateral hearing loss, unspecified hearing loss type  H91.93 [facility] - Marizol Calhoun MD, Otolaryngology ([facility]), [place]        1. Medicare annual wellness visit, subsequent  updated  2. Encounter for screening mammogram for malignant neoplasm of breast  -     JENNIFER DIGITAL SCREEN W OR WO CAD BILATERAL; Future  3. Screening for colon cancer  Declines colonoscopy, painful on last one she had  Understands she will need colonoscopy if she has a positive cologuard  -     Cologuard (Fecal DNA Colorectal Cancer Screening); Future  4. Chronic atrial fibrillation, unspecified (HCC)  Refilled med  No NSAID's  No

## 2024-05-30 ASSESSMENT — ENCOUNTER SYMPTOMS
BACK PAIN: 1
BLOOD IN STOOL: 0
SHORTNESS OF BREATH: 0

## 2024-05-31 DIAGNOSIS — E11.21 TYPE 2 DIABETES MELLITUS WITH DIABETIC NEPHROPATHY, WITHOUT LONG-TERM CURRENT USE OF INSULIN (HCC): ICD-10-CM

## 2024-05-31 DIAGNOSIS — E03.9 ACQUIRED HYPOTHYROIDISM: ICD-10-CM

## 2024-05-31 DIAGNOSIS — I10 ESSENTIAL (PRIMARY) HYPERTENSION: ICD-10-CM

## 2024-05-31 LAB
ALBUMIN SERPL-MCNC: 3.4 G/DL (ref 3.5–5)
ALBUMIN/GLOB SERPL: 1.1 (ref 1.1–2.2)
ALP SERPL-CCNC: 81 U/L (ref 45–117)
ALT SERPL-CCNC: 12 U/L (ref 12–78)
ANION GAP SERPL CALC-SCNC: 5 MMOL/L (ref 5–15)
AST SERPL-CCNC: 14 U/L (ref 15–37)
BASOPHILS # BLD: 0.1 K/UL (ref 0–0.1)
BASOPHILS NFR BLD: 1 % (ref 0–1)
BILIRUB DIRECT SERPL-MCNC: 0.2 MG/DL (ref 0–0.2)
BILIRUB SERPL-MCNC: 0.8 MG/DL (ref 0.2–1)
BUN SERPL-MCNC: 23 MG/DL (ref 6–20)
BUN/CREAT SERPL: 23 (ref 12–20)
CALCIUM SERPL-MCNC: 8.9 MG/DL (ref 8.5–10.1)
CHLORIDE SERPL-SCNC: 107 MMOL/L (ref 97–108)
CHOLEST SERPL-MCNC: 197 MG/DL
CO2 SERPL-SCNC: 26 MMOL/L (ref 21–32)
CREAT SERPL-MCNC: 1.02 MG/DL (ref 0.55–1.02)
CREAT UR-MCNC: 134 MG/DL
DIFFERENTIAL METHOD BLD: NORMAL
EOSINOPHIL # BLD: 0.1 K/UL (ref 0–0.4)
EOSINOPHIL NFR BLD: 1 % (ref 0–7)
ERYTHROCYTE [DISTWIDTH] IN BLOOD BY AUTOMATED COUNT: 13.7 % (ref 11.5–14.5)
EST. AVERAGE GLUCOSE BLD GHB EST-MCNC: 105 MG/DL
GLOBULIN SER CALC-MCNC: 3.2 G/DL (ref 2–4)
GLUCOSE SERPL-MCNC: 101 MG/DL (ref 65–100)
HBA1C MFR BLD: 5.3 % (ref 4–5.6)
HCT VFR BLD AUTO: 36.9 % (ref 35–47)
HDLC SERPL-MCNC: 63 MG/DL
HDLC SERPL: 3.1 (ref 0–5)
HGB BLD-MCNC: 11.7 G/DL (ref 11.5–16)
IMM GRANULOCYTES # BLD AUTO: 0 K/UL (ref 0–0.04)
IMM GRANULOCYTES NFR BLD AUTO: 0 % (ref 0–0.5)
LDLC SERPL CALC-MCNC: 123.8 MG/DL (ref 0–100)
LYMPHOCYTES # BLD: 1.6 K/UL (ref 0.8–3.5)
LYMPHOCYTES NFR BLD: 23 % (ref 12–49)
MCH RBC QN AUTO: 27.7 PG (ref 26–34)
MCHC RBC AUTO-ENTMCNC: 31.7 G/DL (ref 30–36.5)
MCV RBC AUTO: 87.2 FL (ref 80–99)
MICROALBUMIN UR-MCNC: 2.21 MG/DL
MICROALBUMIN/CREAT UR-RTO: 16 MG/G (ref 0–30)
MONOCYTES # BLD: 0.5 K/UL (ref 0–1)
MONOCYTES NFR BLD: 8 % (ref 5–13)
NEUTS SEG # BLD: 4.9 K/UL (ref 1.8–8)
NEUTS SEG NFR BLD: 67 % (ref 32–75)
NRBC # BLD: 0 K/UL (ref 0–0.01)
NRBC BLD-RTO: 0 PER 100 WBC
PLATELET # BLD AUTO: 311 K/UL (ref 150–400)
PMV BLD AUTO: 10.4 FL (ref 8.9–12.9)
POTASSIUM SERPL-SCNC: 4.2 MMOL/L (ref 3.5–5.1)
PROT SERPL-MCNC: 6.6 G/DL (ref 6.4–8.2)
RBC # BLD AUTO: 4.23 M/UL (ref 3.8–5.2)
SODIUM SERPL-SCNC: 138 MMOL/L (ref 136–145)
TRIGL SERPL-MCNC: 51 MG/DL
TSH SERPL DL<=0.05 MIU/L-ACNC: 0.29 UIU/ML (ref 0.36–3.74)
VLDLC SERPL CALC-MCNC: 10.2 MG/DL
WBC # BLD AUTO: 7.2 K/UL (ref 3.6–11)

## 2024-06-23 LAB — NONINV COLON CA DNA+OCC BLD SCRN STL QL: NEGATIVE

## 2024-07-15 ENCOUNTER — OFFICE VISIT (OUTPATIENT)
Facility: CLINIC | Age: 69
End: 2024-07-15
Payer: MEDICARE

## 2024-07-15 VITALS
HEART RATE: 70 BPM | OXYGEN SATURATION: 97 % | TEMPERATURE: 97.7 F | HEIGHT: 68 IN | SYSTOLIC BLOOD PRESSURE: 103 MMHG | WEIGHT: 293 LBS | BODY MASS INDEX: 44.41 KG/M2 | DIASTOLIC BLOOD PRESSURE: 65 MMHG | RESPIRATION RATE: 17 BRPM

## 2024-07-15 DIAGNOSIS — E03.9 ACQUIRED HYPOTHYROIDISM: Primary | ICD-10-CM

## 2024-07-15 DIAGNOSIS — I48.11 LONGSTANDING PERSISTENT ATRIAL FIBRILLATION (HCC): ICD-10-CM

## 2024-07-15 DIAGNOSIS — E11.21 TYPE 2 DIABETES WITH NEPHROPATHY (HCC): ICD-10-CM

## 2024-07-15 DIAGNOSIS — H91.93 BILATERAL HEARING LOSS, UNSPECIFIED HEARING LOSS TYPE: ICD-10-CM

## 2024-07-15 DIAGNOSIS — I20.9 ANGINA PECTORIS, UNSPECIFIED (HCC): ICD-10-CM

## 2024-07-15 PROBLEM — I25.119 ATHEROSCLEROTIC HEART DISEASE OF NATIVE CORONARY ARTERY WITH UNSPECIFIED ANGINA PECTORIS (HCC): Status: ACTIVE | Noted: 2024-07-15

## 2024-07-15 LAB — HBA1C MFR BLD: 5.5 %

## 2024-07-15 PROCEDURE — G8427 DOCREV CUR MEDS BY ELIG CLIN: HCPCS | Performed by: FAMILY MEDICINE

## 2024-07-15 PROCEDURE — 3017F COLORECTAL CA SCREEN DOC REV: CPT | Performed by: FAMILY MEDICINE

## 2024-07-15 PROCEDURE — 3044F HG A1C LEVEL LT 7.0%: CPT | Performed by: FAMILY MEDICINE

## 2024-07-15 PROCEDURE — 3074F SYST BP LT 130 MM HG: CPT | Performed by: FAMILY MEDICINE

## 2024-07-15 PROCEDURE — 1090F PRES/ABSN URINE INCON ASSESS: CPT | Performed by: FAMILY MEDICINE

## 2024-07-15 PROCEDURE — G8417 CALC BMI ABV UP PARAM F/U: HCPCS | Performed by: FAMILY MEDICINE

## 2024-07-15 PROCEDURE — 83036 HEMOGLOBIN GLYCOSYLATED A1C: CPT | Performed by: FAMILY MEDICINE

## 2024-07-15 PROCEDURE — 3078F DIAST BP <80 MM HG: CPT | Performed by: FAMILY MEDICINE

## 2024-07-15 PROCEDURE — 99214 OFFICE O/P EST MOD 30 MIN: CPT | Performed by: FAMILY MEDICINE

## 2024-07-15 PROCEDURE — 1036F TOBACCO NON-USER: CPT | Performed by: FAMILY MEDICINE

## 2024-07-15 PROCEDURE — G8400 PT W/DXA NO RESULTS DOC: HCPCS | Performed by: FAMILY MEDICINE

## 2024-07-15 PROCEDURE — 1123F ACP DISCUSS/DSCN MKR DOCD: CPT | Performed by: FAMILY MEDICINE

## 2024-07-15 PROCEDURE — 2022F DILAT RTA XM EVC RTNOPTHY: CPT | Performed by: FAMILY MEDICINE

## 2024-07-15 RX ORDER — LEVOTHYROXINE SODIUM 0.12 MG/1
125 TABLET ORAL DAILY
Qty: 90 TABLET | Refills: 1 | Status: SHIPPED | OUTPATIENT
Start: 2024-07-15

## 2024-07-15 ASSESSMENT — ENCOUNTER SYMPTOMS
BLOOD IN STOOL: 0
SHORTNESS OF BREATH: 0

## 2024-07-15 NOTE — PROGRESS NOTES
Identified patient with two patient identifiers (name and ). Reviewed chart in preparation for visit and have obtained necessary documentation.    Zabrina Morgan is a 69 y.o. female  No chief complaint on file.    Resp 17   Ht 1.727 m (5' 8\")   Wt (!) 141.1 kg (311 lb)   BMI 47.29 kg/m²     1. Have you been to the ER, urgent care clinic since your last visit?  Hospitalized since your last visit?no    2. Have you seen or consulted any other health care providers outside of the [facility] since your last visit?  Include any pap smears or colon screening. no   
rivaroxaban (XARELTO) 20 MG TABS tablet Take 1 tablet by mouth daily    losartan (COZAAR) 100 MG tablet Take 1 tablet by mouth daily    isosorbide mononitrate (IMDUR) 30 MG extended release tablet Take 1 tablet by mouth every morning    amLODIPine (NORVASC) 10 MG tablet Take 1 tablet by mouth every morning    amiodarone (CORDARONE) 200 MG tablet Take 1 tablet by mouth daily    acetaminophen (TYLENOL) 500 MG CAPS capsule Take 1 capsule by mouth    ketoconazole (NIZORAL) 2 % cream Apply topically daily.    polyethylene glycol (GLYCOLAX) 17 GM/SCOOP powder Take 17 g by mouth daily     No current facility-administered medications for this visit.     Medications Discontinued During This Encounter   Medication Reason    metoprolol succinate (TOPROL XL) 50 MG extended release tablet Not A Current Medication    levothyroxine (SYNTHROID) 137 MCG tablet REORDER     ~~~~~~~~~~~~~~~~~~~~~~~~~~~~~~~~~~~~~~~~~~~~~~~~~~~~~~~~~~~    Chief Complaint   Patient presents with    Follow-up    Other     Thyroid, cardiac meds.       History provided by patient  History of Present Illness   Zabrina Morgan is a 69 y.o. female who presents to clinic today for:  Follow-up and Other (Thyroid, cardiac meds.)    Thyroid  Last two TSH's suppressed  Needs lower thyroid dose    DM2  Diet only  Has gained back some of the weight she lost  Needs to lose weight in order to qualify for TKR's  POC A1c 5.5    Atrial fib  Remains on amiodarone  We have been prescribing the amiodarone and xarelto for her.  Sees Dr. Holguin again in a few months.        Health Maintenance  Completed HM gaps at today's visit.  COMPLETED COLOGUARD  Health Maintenance Due   Topic Date Due    DEXA (modify frequency per FRAX score)  Never done    Respiratory Syncytial Virus (RSV) Pregnant or age 60 yrs+ (1 - 1-dose 60+ series) Never done    DTaP/Tdap/Td vaccine (1 - Tdap) 10/19/2018    Diabetic retinal exam  09/18/2020    COVID-19 Vaccine (3 - 2023-24 season) 09/01/2023    Breast

## 2024-12-07 DIAGNOSIS — I48.20 CHRONIC ATRIAL FIBRILLATION, UNSPECIFIED (HCC): ICD-10-CM

## 2024-12-07 DIAGNOSIS — I20.9 ANGINA PECTORIS, UNSPECIFIED (HCC): ICD-10-CM

## 2024-12-07 DIAGNOSIS — I10 ESSENTIAL (PRIMARY) HYPERTENSION: ICD-10-CM

## 2024-12-07 DIAGNOSIS — E03.9 ACQUIRED HYPOTHYROIDISM: ICD-10-CM

## 2024-12-07 DIAGNOSIS — L30.4 INTERTRIGO: ICD-10-CM

## 2024-12-09 RX ORDER — AMLODIPINE BESYLATE 10 MG/1
10 TABLET ORAL EVERY MORNING
Qty: 90 TABLET | Refills: 1 | Status: SHIPPED | OUTPATIENT
Start: 2024-12-09

## 2024-12-09 RX ORDER — AMIODARONE HYDROCHLORIDE 200 MG/1
200 TABLET ORAL DAILY
Qty: 90 TABLET | Refills: 1 | Status: SHIPPED | OUTPATIENT
Start: 2024-12-09

## 2024-12-09 RX ORDER — LOSARTAN POTASSIUM 100 MG/1
100 TABLET ORAL DAILY
Qty: 90 TABLET | Refills: 1 | Status: SHIPPED | OUTPATIENT
Start: 2024-12-09

## 2024-12-09 RX ORDER — LEVOTHYROXINE SODIUM 125 UG/1
125 TABLET ORAL DAILY
Qty: 90 TABLET | Refills: 1 | Status: SHIPPED | OUTPATIENT
Start: 2024-12-09

## 2024-12-09 RX ORDER — KETOCONAZOLE 20 MG/G
CREAM TOPICAL
Qty: 30 G | Refills: 1 | Status: SHIPPED | OUTPATIENT
Start: 2024-12-09

## 2024-12-09 RX ORDER — AMIODARONE HYDROCHLORIDE 200 MG/1
200 TABLET ORAL DAILY
Qty: 90 TABLET | Refills: 3 | OUTPATIENT
Start: 2024-12-09

## 2024-12-09 RX ORDER — ISOSORBIDE MONONITRATE 30 MG/1
30 TABLET, EXTENDED RELEASE ORAL EVERY MORNING
Qty: 90 TABLET | Refills: 3 | OUTPATIENT
Start: 2024-12-09

## 2024-12-09 RX ORDER — ISOSORBIDE MONONITRATE 30 MG/1
30 TABLET, EXTENDED RELEASE ORAL EVERY MORNING
Qty: 90 TABLET | Refills: 1 | Status: SHIPPED | OUTPATIENT
Start: 2024-12-09

## 2024-12-20 DIAGNOSIS — I48.20 CHRONIC ATRIAL FIBRILLATION, UNSPECIFIED (HCC): ICD-10-CM

## 2024-12-20 RX ORDER — RIVAROXABAN 20 MG/1
20 TABLET, FILM COATED ORAL DAILY
Qty: 90 TABLET | Refills: 1 | Status: SHIPPED | OUTPATIENT
Start: 2024-12-20

## 2025-01-05 SDOH — ECONOMIC STABILITY: FOOD INSECURITY: WITHIN THE PAST 12 MONTHS, YOU WORRIED THAT YOUR FOOD WOULD RUN OUT BEFORE YOU GOT MONEY TO BUY MORE.: NEVER TRUE

## 2025-01-05 SDOH — ECONOMIC STABILITY: FOOD INSECURITY: WITHIN THE PAST 12 MONTHS, THE FOOD YOU BOUGHT JUST DIDN'T LAST AND YOU DIDN'T HAVE MONEY TO GET MORE.: NEVER TRUE

## 2025-01-05 SDOH — ECONOMIC STABILITY: INCOME INSECURITY: HOW HARD IS IT FOR YOU TO PAY FOR THE VERY BASICS LIKE FOOD, HOUSING, MEDICAL CARE, AND HEATING?: NOT HARD AT ALL

## 2025-01-13 ENCOUNTER — OFFICE VISIT (OUTPATIENT)
Facility: CLINIC | Age: 70
End: 2025-01-13
Payer: MEDICARE

## 2025-01-13 VITALS
HEIGHT: 68 IN | HEART RATE: 60 BPM | TEMPERATURE: 98 F | RESPIRATION RATE: 17 BRPM | DIASTOLIC BLOOD PRESSURE: 64 MMHG | BODY MASS INDEX: 44.41 KG/M2 | OXYGEN SATURATION: 95 % | WEIGHT: 293 LBS | SYSTOLIC BLOOD PRESSURE: 145 MMHG

## 2025-01-13 DIAGNOSIS — I48.11 LONGSTANDING PERSISTENT ATRIAL FIBRILLATION (HCC): ICD-10-CM

## 2025-01-13 DIAGNOSIS — E11.21 TYPE 2 DIABETES WITH NEPHROPATHY (HCC): ICD-10-CM

## 2025-01-13 DIAGNOSIS — N18.31 STAGE 3A CHRONIC KIDNEY DISEASE (HCC): ICD-10-CM

## 2025-01-13 DIAGNOSIS — R53.82 CHRONIC FATIGUE: ICD-10-CM

## 2025-01-13 DIAGNOSIS — G57.93 NEUROPATHY OF BOTH FEET: ICD-10-CM

## 2025-01-13 DIAGNOSIS — E03.9 ACQUIRED HYPOTHYROIDISM: ICD-10-CM

## 2025-01-13 DIAGNOSIS — M25.50 DIFFUSE ARTHRALGIA: ICD-10-CM

## 2025-01-13 DIAGNOSIS — I10 ESSENTIAL (PRIMARY) HYPERTENSION: Primary | ICD-10-CM

## 2025-01-13 DIAGNOSIS — E53.8 VITAMIN B12 DEFICIENCY: ICD-10-CM

## 2025-01-13 PROCEDURE — 1159F MED LIST DOCD IN RCRD: CPT | Performed by: FAMILY MEDICINE

## 2025-01-13 PROCEDURE — 3046F HEMOGLOBIN A1C LEVEL >9.0%: CPT | Performed by: FAMILY MEDICINE

## 2025-01-13 PROCEDURE — 1090F PRES/ABSN URINE INCON ASSESS: CPT | Performed by: FAMILY MEDICINE

## 2025-01-13 PROCEDURE — 3017F COLORECTAL CA SCREEN DOC REV: CPT | Performed by: FAMILY MEDICINE

## 2025-01-13 PROCEDURE — 1123F ACP DISCUSS/DSCN MKR DOCD: CPT | Performed by: FAMILY MEDICINE

## 2025-01-13 PROCEDURE — 1125F AMNT PAIN NOTED PAIN PRSNT: CPT | Performed by: FAMILY MEDICINE

## 2025-01-13 PROCEDURE — 3078F DIAST BP <80 MM HG: CPT | Performed by: FAMILY MEDICINE

## 2025-01-13 PROCEDURE — G8400 PT W/DXA NO RESULTS DOC: HCPCS | Performed by: FAMILY MEDICINE

## 2025-01-13 PROCEDURE — G8427 DOCREV CUR MEDS BY ELIG CLIN: HCPCS | Performed by: FAMILY MEDICINE

## 2025-01-13 PROCEDURE — 99214 OFFICE O/P EST MOD 30 MIN: CPT | Performed by: FAMILY MEDICINE

## 2025-01-13 PROCEDURE — 1036F TOBACCO NON-USER: CPT | Performed by: FAMILY MEDICINE

## 2025-01-13 PROCEDURE — G8417 CALC BMI ABV UP PARAM F/U: HCPCS | Performed by: FAMILY MEDICINE

## 2025-01-13 PROCEDURE — G2211 COMPLEX E/M VISIT ADD ON: HCPCS | Performed by: FAMILY MEDICINE

## 2025-01-13 PROCEDURE — 3077F SYST BP >= 140 MM HG: CPT | Performed by: FAMILY MEDICINE

## 2025-01-13 PROCEDURE — 2022F DILAT RTA XM EVC RTNOPTHY: CPT | Performed by: FAMILY MEDICINE

## 2025-01-13 RX ORDER — GABAPENTIN 300 MG/1
300 CAPSULE ORAL 2 TIMES DAILY
Qty: 180 CAPSULE | Refills: 1 | Status: SHIPPED | OUTPATIENT
Start: 2025-01-13 | End: 2025-07-12

## 2025-01-13 RX ORDER — TRAMADOL HYDROCHLORIDE 50 MG/1
50 TABLET ORAL
Qty: 30 TABLET | Refills: 0 | Status: SHIPPED | OUTPATIENT
Start: 2025-01-13 | End: 2025-02-12

## 2025-01-13 SDOH — ECONOMIC STABILITY: FOOD INSECURITY: WITHIN THE PAST 12 MONTHS, THE FOOD YOU BOUGHT JUST DIDN'T LAST AND YOU DIDN'T HAVE MONEY TO GET MORE.: NEVER TRUE

## 2025-01-13 SDOH — ECONOMIC STABILITY: FOOD INSECURITY: WITHIN THE PAST 12 MONTHS, YOU WORRIED THAT YOUR FOOD WOULD RUN OUT BEFORE YOU GOT MONEY TO BUY MORE.: NEVER TRUE

## 2025-01-13 ASSESSMENT — PATIENT HEALTH QUESTIONNAIRE - PHQ9
SUM OF ALL RESPONSES TO PHQ QUESTIONS 1-9: 0
1. LITTLE INTEREST OR PLEASURE IN DOING THINGS: NOT AT ALL
2. FEELING DOWN, DEPRESSED OR HOPELESS: NOT AT ALL
SUM OF ALL RESPONSES TO PHQ9 QUESTIONS 1 & 2: 0
SUM OF ALL RESPONSES TO PHQ QUESTIONS 1-9: 0

## 2025-01-13 ASSESSMENT — ENCOUNTER SYMPTOMS
SHORTNESS OF BREATH: 0
BLOOD IN STOOL: 0
BACK PAIN: 1

## 2025-01-13 NOTE — PROGRESS NOTES
\"Have you been to the ER, urgent care clinic since your last visit?  Hospitalized since your last visit?\"    NO    “Have you seen or consulted any other health care providers outside our system since your last visit?”    NO    Have you had a mammogram?”   YES - Where: [facility] 10/2024    Date of last Mammogram: 4/12/2022       “Have you had a diabetic eye exam?”    NO     Date of last diabetic eye exam: 9/18/2019

## 2025-01-13 NOTE — PROGRESS NOTES
Zabrina Morgan  70 y.o. female  1955  MRN:[id number]  [facility]  Progress Note     Encounter Date: 1/13/2025    Assessment and Plan:       ICD-10-CM    1. Essential (primary) hypertension  I10 CBC with Auto Differential     Hepatic Function Panel     Basic Metabolic Panel     Basic Metabolic Panel     Hepatic Function Panel     CBC with Auto Differential      2. Stage 3a chronic kidney disease (HCC)  N18.31       3. Longstanding persistent atrial fibrillation (HCC)  I48.11       4. Type 2 diabetes with nephropathy (HCC)  E11.21 Hemoglobin A1C     Hemoglobin A1C      5. Acquired hypothyroidism  E03.9 TSH     TSH      6. Vitamin B12 deficiency  E53.8 Vitamin B12     Vitamin B12      7. Chronic fatigue  R53.82 CBC with Auto Differential     TSH     TSH     CBC with Auto Differential      8. Diffuse arthralgia  M25.50 traMADol (ULTRAM) 50 MG tablet      9. Neuropathy of both feet  G57.93 gabapentin (NEURONTIN) 300 MG capsule        1. Essential (primary) hypertension  Mildly elevated.  Previously at goal but has gained back thirty pounds  Continue same meds.  May need to consider diuretic.  This might help with chronic edema which is party due to amlodipine and partly due to cardiac issues and weight.  -     CBC with Auto Differential; Future  -     Hepatic Function Panel; Future  -     Basic Metabolic Panel; Future  2. Stage 3a chronic kidney disease (HCC)  Really has CKD 2  Last renal function from 10/24 showed creatinine 0.93 GFR 66 and no proteinuria  3. Longstanding persistent atrial fibrillation (HCC)  Has pacemaker.  Remains on Xarelto  4. Type 2 diabetes with nephropathy (HCC)  Recheck status with recent weight regain.  -     Hemoglobin A1C; Future  5. Acquired hypothyroidism  Fatigue and arthropathy  Update labs.  States she takes med consistently  -     TSH; Future  6. Vitamin B12 deficiency  Neuropathy sx likely from back.  Check B12 which was low in the past.  -     Vitamin

## 2025-01-14 LAB
ALBUMIN SERPL-MCNC: 3.5 G/DL (ref 3.5–5)
ALBUMIN/GLOB SERPL: 1.1 (ref 1.1–2.2)
ALP SERPL-CCNC: 93 U/L (ref 45–117)
ALT SERPL-CCNC: 16 U/L (ref 12–78)
ANION GAP SERPL CALC-SCNC: 9 MMOL/L (ref 2–12)
AST SERPL-CCNC: 12 U/L (ref 15–37)
BASOPHILS # BLD: 0.05 K/UL (ref 0–0.1)
BASOPHILS NFR BLD: 0.7 % (ref 0–1)
BILIRUB DIRECT SERPL-MCNC: 0.2 MG/DL (ref 0–0.2)
BILIRUB SERPL-MCNC: 0.6 MG/DL (ref 0.2–1)
BUN SERPL-MCNC: 13 MG/DL (ref 6–20)
BUN/CREAT SERPL: 14 (ref 12–20)
CALCIUM SERPL-MCNC: 9.2 MG/DL (ref 8.5–10.1)
CHLORIDE SERPL-SCNC: 107 MMOL/L (ref 97–108)
CO2 SERPL-SCNC: 24 MMOL/L (ref 21–32)
CREAT SERPL-MCNC: 0.96 MG/DL (ref 0.55–1.02)
DIFFERENTIAL METHOD BLD: ABNORMAL
EOSINOPHIL # BLD: 0.2 K/UL (ref 0–0.4)
EOSINOPHIL NFR BLD: 2.7 % (ref 0–7)
ERYTHROCYTE [DISTWIDTH] IN BLOOD BY AUTOMATED COUNT: 16.4 % (ref 11.5–14.5)
EST. AVERAGE GLUCOSE BLD GHB EST-MCNC: 100 MG/DL
GLOBULIN SER CALC-MCNC: 3.1 G/DL (ref 2–4)
GLUCOSE SERPL-MCNC: 93 MG/DL (ref 65–100)
HBA1C MFR BLD: 5.1 % (ref 4–5.6)
HCT VFR BLD AUTO: 34.9 % (ref 35–47)
HGB BLD-MCNC: 10.3 G/DL (ref 11.5–16)
IMM GRANULOCYTES # BLD AUTO: 0.04 K/UL (ref 0–0.04)
IMM GRANULOCYTES NFR BLD AUTO: 0.5 % (ref 0–0.5)
LYMPHOCYTES # BLD: 1.67 K/UL (ref 0.8–3.5)
LYMPHOCYTES NFR BLD: 22.6 % (ref 12–49)
MCH RBC QN AUTO: 26.1 PG (ref 26–34)
MCHC RBC AUTO-ENTMCNC: 29.5 G/DL (ref 30–36.5)
MCV RBC AUTO: 88.4 FL (ref 80–99)
MONOCYTES # BLD: 0.73 K/UL (ref 0–1)
MONOCYTES NFR BLD: 9.9 % (ref 5–13)
NEUTS SEG # BLD: 4.71 K/UL (ref 1.8–8)
NEUTS SEG NFR BLD: 63.6 % (ref 32–75)
NRBC # BLD: 0 K/UL (ref 0–0.01)
NRBC BLD-RTO: 0 PER 100 WBC
PLATELET # BLD AUTO: 320 K/UL (ref 150–400)
PMV BLD AUTO: 10.3 FL (ref 8.9–12.9)
POTASSIUM SERPL-SCNC: 4 MMOL/L (ref 3.5–5.1)
PROT SERPL-MCNC: 6.6 G/DL (ref 6.4–8.2)
RBC # BLD AUTO: 3.95 M/UL (ref 3.8–5.2)
SODIUM SERPL-SCNC: 140 MMOL/L (ref 136–145)
TSH SERPL DL<=0.05 MIU/L-ACNC: 0.9 UIU/ML (ref 0.36–3.74)
VIT B12 SERPL-MCNC: 124 PG/ML (ref 193–986)
WBC # BLD AUTO: 7.4 K/UL (ref 3.6–11)

## 2025-01-20 ENCOUNTER — TELEPHONE (OUTPATIENT)
Facility: CLINIC | Age: 70
End: 2025-01-20

## 2025-01-20 NOTE — TELEPHONE ENCOUNTER
Called and spoke to  [facility]  B12 is low and mildly anemic.  Other labs OK  He relates that her gabapentin makes her feel drunk and not tolerating it well. Advised to taper off of that med.  Staff will call and get her appt to resume B12.  [facility]

## 2025-01-31 ENCOUNTER — TELEPHONE (OUTPATIENT)
Facility: CLINIC | Age: 70
End: 2025-01-31
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
## 2025-02-04 ENCOUNTER — OFFICE VISIT (OUTPATIENT)
Facility: CLINIC | Age: 70
End: 2025-02-04
Payer: MEDICARE

## 2025-02-04 VITALS
TEMPERATURE: 98 F | SYSTOLIC BLOOD PRESSURE: 142 MMHG | WEIGHT: 293 LBS | RESPIRATION RATE: 16 BRPM | BODY MASS INDEX: 44.41 KG/M2 | OXYGEN SATURATION: 96 % | DIASTOLIC BLOOD PRESSURE: 68 MMHG | HEIGHT: 68 IN | HEART RATE: 60 BPM

## 2025-02-04 DIAGNOSIS — M25.50 DIFFUSE ARTHRALGIA: ICD-10-CM

## 2025-02-04 DIAGNOSIS — E53.8 VITAMIN B12 DEFICIENCY: Primary | ICD-10-CM

## 2025-02-04 PROCEDURE — G8417 CALC BMI ABV UP PARAM F/U: HCPCS | Performed by: FAMILY MEDICINE

## 2025-02-04 PROCEDURE — 1036F TOBACCO NON-USER: CPT | Performed by: FAMILY MEDICINE

## 2025-02-04 PROCEDURE — 3078F DIAST BP <80 MM HG: CPT | Performed by: FAMILY MEDICINE

## 2025-02-04 PROCEDURE — 3077F SYST BP >= 140 MM HG: CPT | Performed by: FAMILY MEDICINE

## 2025-02-04 PROCEDURE — G8427 DOCREV CUR MEDS BY ELIG CLIN: HCPCS | Performed by: FAMILY MEDICINE

## 2025-02-04 PROCEDURE — 1159F MED LIST DOCD IN RCRD: CPT | Performed by: FAMILY MEDICINE

## 2025-02-04 PROCEDURE — 1090F PRES/ABSN URINE INCON ASSESS: CPT | Performed by: FAMILY MEDICINE

## 2025-02-04 PROCEDURE — 1123F ACP DISCUSS/DSCN MKR DOCD: CPT | Performed by: FAMILY MEDICINE

## 2025-02-04 PROCEDURE — G8400 PT W/DXA NO RESULTS DOC: HCPCS | Performed by: FAMILY MEDICINE

## 2025-02-04 PROCEDURE — 1125F AMNT PAIN NOTED PAIN PRSNT: CPT | Performed by: FAMILY MEDICINE

## 2025-02-04 PROCEDURE — 99214 OFFICE O/P EST MOD 30 MIN: CPT | Performed by: FAMILY MEDICINE

## 2025-02-04 PROCEDURE — 3017F COLORECTAL CA SCREEN DOC REV: CPT | Performed by: FAMILY MEDICINE

## 2025-02-04 RX ORDER — TRAMADOL HYDROCHLORIDE 50 MG/1
50 TABLET ORAL
Qty: 30 TABLET | Refills: 0 | Status: SHIPPED | OUTPATIENT
Start: 2025-02-04 | End: 2025-03-06

## 2025-02-04 RX ORDER — CYANOCOBALAMIN 1000 UG/ML
1000 INJECTION, SOLUTION INTRAMUSCULAR; SUBCUTANEOUS
Qty: 10 ML | Refills: 0 | Status: SHIPPED | OUTPATIENT
Start: 2025-02-04

## 2025-02-04 ASSESSMENT — ENCOUNTER SYMPTOMS: BACK PAIN: 1

## 2025-02-04 NOTE — PROGRESS NOTES
Zabrina Morgan  70 y.o. female  1955  MRN:[id number]  [facility]  Progress Note     Encounter Date: 2/4/2025    Assessment and Plan:       ICD-10-CM    1. Vitamin B12 deficiency  E53.8 cyanocobalamin 1000 MCG/ML injection      2. Diffuse arthralgia  M25.50 traMADol (ULTRAM) 50 MG tablet        1. Vitamin B12 deficiency  Possible neuropathy from B12 def.  Unclear what part or percentage is from B12 and what is from other conditions.   rx and FU tomorrow to start B12  -     cyanocobalamin 1000 MCG/ML injection; Inject 1 mL into the muscle every 7 days For one month then once a month., Disp-10 mL, R-0Normal  2. Diffuse arthralgia  Refilled tramadol to take qhs for pain.  -     traMADol (ULTRAM) 50 MG tablet; Take 1 tablet by mouth nightly for 30 days. Intended supply: 5 days. Take lowest dose possible to manage pain Max Daily Amount: 50 mg, Disp-30 tablet, R-0Normal       I have discussed the diagnosis with the patient and the intended plan as seen in the above orders.  she has expressed understanding.  The patient has received an after-visit summary and questions were answered concerning future plans.  I have discussed medication side effects and warnings with the patient as well.    Electronically Signed: Julio C Flowers MD    Current Medications after this visit     Current Outpatient Medications   Medication Sig    traMADol (ULTRAM) 50 MG tablet Take 1 tablet by mouth nightly for 30 days. Intended supply: 5 days. Take lowest dose possible to manage pain Max Daily Amount: 50 mg    cyanocobalamin 1000 MCG/ML injection Inject 1 mL into the muscle every 7 days For one month then once a month.    XARELTO 20 MG TABS tablet TAKE 1 TABLET BY MOUTH EVERY DAY    ketoconazole (NIZORAL) 2 % cream Apply topically daily.    losartan (COZAAR) 100 MG tablet Take 1 tablet by mouth daily    isosorbide mononitrate (IMDUR) 30 MG extended release tablet Take 1 tablet by mouth every morning

## 2025-02-04 NOTE — PROGRESS NOTES
\"Have you been to the ER, urgent care clinic since your last visit?  Hospitalized since your last visit?\"    Patient visited [facility] due to a fall down the steps in her home.    “Have you seen or consulted any other health care providers outside our system since your last visit?”    NO    Have you had a mammogram?”   YES - Where: [facility] 05/28/2024.    Date of last Mammogram: 4/12/2022       “Have you had a diabetic eye exam?”    NO     Date of last diabetic eye exam: 9/18/2019

## 2025-02-12 ENCOUNTER — TELEMEDICINE (OUTPATIENT)
Facility: CLINIC | Age: 70
End: 2025-02-12

## 2025-02-12 DIAGNOSIS — M54.50 ACUTE MIDLINE LOW BACK PAIN WITHOUT SCIATICA: Primary | ICD-10-CM

## 2025-02-12 DIAGNOSIS — M25.50 DIFFUSE ARTHRALGIA: ICD-10-CM

## 2025-02-12 RX ORDER — TRAMADOL HYDROCHLORIDE 50 MG/1
50 TABLET ORAL EVERY 8 HOURS PRN
Qty: 30 TABLET | Refills: 0 | Status: SHIPPED | OUTPATIENT
Start: 2025-02-12 | End: 2025-03-14

## 2025-02-12 ASSESSMENT — ENCOUNTER SYMPTOMS
BACK PAIN: 1
GASTROINTESTINAL NEGATIVE: 1
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
## 2025-02-12 NOTE — PROGRESS NOTES
\"Have you been to the ER, urgent care clinic since your last visit?  Hospitalized since your last visit?\"    NO    “Have you seen or consulted any other health care providers outside our system since your last visit?”    NO    Have you had a mammogram?”   NO    Date of last Mammogram: 4/12/2022       “Have you had a diabetic eye exam?”    NO     Date of last diabetic eye exam: 9/18/2019            [FreeTextEntry1] : Estrellita is a 2-year-old girl who has a history of Marfan syndrome who comes in today for followup on her bilateral pes planovalgus. She is currently ambulating with her SMO orthotics responding very well as per the mother. The AFOs are leading to an improving gait. She appears to have no discomfort with ambulating in her braces. Mother feels they are still fitting well.  There appears be no radiating pain/numbness or tingling going into her toes. She comes in today for orthopedic followup and new braces. Please refer to previous note for more information.\par \par Today, she presents to the office for followup on her bilateral flexible flat feet. She is very active running and jumping with no complaints of discomfort however the mother believes that she outgrew her orthotics. She would like low-profile orthotics if possible. Mom state she attempted to walk in high tops without the braces resulting in a normal walking gait as per the mother with good coordination. She comes in today for a pediatric orthopedic followup exam.\par \par As per family Hx. Dad has positive Marfan syndrome but never had any feet/ankle symptoms. Mom has Hx of foot/ankle surgery and ankle fusion.

## 2025-02-25 ENCOUNTER — OFFICE VISIT (OUTPATIENT)
Facility: CLINIC | Age: 70
End: 2025-02-25
Payer: MEDICARE

## 2025-02-25 VITALS
TEMPERATURE: 98 F | BODY MASS INDEX: 44.41 KG/M2 | RESPIRATION RATE: 17 BRPM | WEIGHT: 293 LBS | HEIGHT: 68 IN | HEART RATE: 62 BPM | SYSTOLIC BLOOD PRESSURE: 134 MMHG | DIASTOLIC BLOOD PRESSURE: 73 MMHG

## 2025-02-25 DIAGNOSIS — E53.8 VITAMIN B12 DEFICIENCY: Primary | ICD-10-CM

## 2025-02-25 PROCEDURE — G8400 PT W/DXA NO RESULTS DOC: HCPCS | Performed by: FAMILY MEDICINE

## 2025-02-25 PROCEDURE — 1123F ACP DISCUSS/DSCN MKR DOCD: CPT | Performed by: FAMILY MEDICINE

## 2025-02-25 PROCEDURE — 3017F COLORECTAL CA SCREEN DOC REV: CPT | Performed by: FAMILY MEDICINE

## 2025-02-25 PROCEDURE — 99213 OFFICE O/P EST LOW 20 MIN: CPT | Performed by: FAMILY MEDICINE

## 2025-02-25 PROCEDURE — 1126F AMNT PAIN NOTED NONE PRSNT: CPT | Performed by: FAMILY MEDICINE

## 2025-02-25 PROCEDURE — G8417 CALC BMI ABV UP PARAM F/U: HCPCS | Performed by: FAMILY MEDICINE

## 2025-02-25 PROCEDURE — 1159F MED LIST DOCD IN RCRD: CPT | Performed by: FAMILY MEDICINE

## 2025-02-25 PROCEDURE — 3075F SYST BP GE 130 - 139MM HG: CPT | Performed by: FAMILY MEDICINE

## 2025-02-25 PROCEDURE — 1090F PRES/ABSN URINE INCON ASSESS: CPT | Performed by: FAMILY MEDICINE

## 2025-02-25 PROCEDURE — G8427 DOCREV CUR MEDS BY ELIG CLIN: HCPCS | Performed by: FAMILY MEDICINE

## 2025-02-25 PROCEDURE — 3078F DIAST BP <80 MM HG: CPT | Performed by: FAMILY MEDICINE

## 2025-02-25 PROCEDURE — 1036F TOBACCO NON-USER: CPT | Performed by: FAMILY MEDICINE

## 2025-02-25 NOTE — PROGRESS NOTES
Zabrina Morgan  70 y.o. female  1955  MRN:[id number]  [facility]  Progress Note     Encounter Date: 2/25/2025    Assessment and Plan:       ICD-10-CM    1. Vitamin B12 deficiency  E53.8         1. Vitamin B12 deficiency  Here for B12 shot given by nurses  No real change in symptoms since last visit.         I have discussed the diagnosis with the patient and the intended plan as seen in the above orders.  she has expressed understanding.  The patient has received an after-visit summary and questions were answered concerning future plans.  I have discussed medication side effects and warnings with the patient as well.    Electronically Signed: Julio C Flowers MD    Current Medications after this visit     Current Outpatient Medications   Medication Sig    traMADol (ULTRAM) 50 MG tablet Take 1 tablet by mouth every 8 hours as needed for Pain for up to 30 days. Intended supply: 5 days. Take lowest dose possible to manage pain Max Daily Amount: 150 mg    cyanocobalamin 1000 MCG/ML injection Inject 1 mL into the muscle every 7 days For one month then once a month.    XARELTO 20 MG TABS tablet TAKE 1 TABLET BY MOUTH EVERY DAY    ketoconazole (NIZORAL) 2 % cream Apply topically daily.    losartan (COZAAR) 100 MG tablet Take 1 tablet by mouth daily    isosorbide mononitrate (IMDUR) 30 MG extended release tablet Take 1 tablet by mouth every morning    amLODIPine (NORVASC) 10 MG tablet Take 1 tablet by mouth every morning    amiodarone (CORDARONE) 200 MG tablet Take 1 tablet by mouth daily    levothyroxine (SYNTHROID) 125 MCG tablet Take 1 tablet by mouth Daily TAKE 1 TABLET DAILY BEFORE BREAKFAST    acetaminophen (TYLENOL) 500 MG CAPS capsule Take 1 capsule by mouth    polyethylene glycol (GLYCOLAX) 17 GM/SCOOP powder Take 17 g by mouth daily     No current facility-administered medications for this visit.     There are no discontinued

## 2025-02-25 NOTE — PROGRESS NOTES
\"Have you been to the ER, urgent care clinic since your last visit?  Hospitalized since your last visit?\"    NO    “Have you seen or consulted any other health care providers outside our system since your last visit?”    NO    Have you had a mammogram?”   NO    Date of last Mammogram: 4/12/2022       “Have you had a diabetic eye exam?”    NO     Date of last diabetic eye exam: 9/18/2019

## 2025-03-03 ENCOUNTER — HOSPITAL ENCOUNTER (OUTPATIENT)
Facility: HOSPITAL | Age: 70
Discharge: HOME | End: 2025-03-06
Payer: MEDICARE

## 2025-03-03 DIAGNOSIS — M54.50 ACUTE MIDLINE LOW BACK PAIN WITHOUT SCIATICA: ICD-10-CM

## 2025-03-03 PROCEDURE — 72100 X-RAY EXAM L-S SPINE 2/3 VWS: CPT

## 2025-03-04 ENCOUNTER — OFFICE VISIT (OUTPATIENT)
Facility: CLINIC | Age: 70
End: 2025-03-04

## 2025-03-04 VITALS
SYSTOLIC BLOOD PRESSURE: 130 MMHG | DIASTOLIC BLOOD PRESSURE: 64 MMHG | TEMPERATURE: 97.9 F | WEIGHT: 293 LBS | OXYGEN SATURATION: 97 % | RESPIRATION RATE: 17 BRPM | HEIGHT: 68 IN | HEART RATE: 60 BPM | BODY MASS INDEX: 44.41 KG/M2

## 2025-03-04 DIAGNOSIS — I48.91 ATRIAL FIBRILLATION, UNSPECIFIED TYPE (HCC): ICD-10-CM

## 2025-03-04 DIAGNOSIS — I10 ESSENTIAL (PRIMARY) HYPERTENSION: ICD-10-CM

## 2025-03-04 DIAGNOSIS — E53.8 VITAMIN B12 DEFICIENCY: ICD-10-CM

## 2025-03-04 DIAGNOSIS — M54.50 ACUTE MIDLINE LOW BACK PAIN WITHOUT SCIATICA: Primary | ICD-10-CM

## 2025-03-04 ASSESSMENT — ENCOUNTER SYMPTOMS
BLOOD IN STOOL: 0
SHORTNESS OF BREATH: 1
BACK PAIN: 1

## 2025-03-04 NOTE — PROGRESS NOTES
Zabrina Morgan  70 y.o. female  1955  MRN:[id number]  [facility]  Progress Note     Encounter Date: 3/4/2025    Assessment and Plan:       ICD-10-CM    1. Acute midline low back pain without sciatica  M54.50       2. Vitamin B12 deficiency  E53.8       3. Essential (primary) hypertension  I10       4. Atrial fibrillation, unspecified type (HCC)  I48.91         1. Acute midline low back pain without sciatica  Patient declines therapy for now but will consider it.  Continue Tramadol and tylenol  No NSAIDs with her Xarelto  Activity as tolerated.  2. Vitamin B12 deficiency  Finish one month of B12 shots and then needs to continue monthly  Not much apparent benefit so far.  3. Essential (primary) hypertension  At goal  4. A fib  Sees Dr. Holguin next month  To be sure to let him know about her dyspnea  Per their records, she has good LV function on ECHO.  She is on amiodarone.       I have discussed the diagnosis with the patient and the intended plan as seen in the above orders.  she has expressed understanding.  The patient has received an after-visit summary and questions were answered concerning future plans.  I have discussed medication side effects and warnings with the patient as well.    Electronically Signed: Julio C Flowers MD    Current Medications after this visit     Current Outpatient Medications   Medication Sig    traMADol (ULTRAM) 50 MG tablet Take 1 tablet by mouth every 8 hours as needed for Pain for up to 30 days. Intended supply: 5 days. Take lowest dose possible to manage pain Max Daily Amount: 150 mg    cyanocobalamin 1000 MCG/ML injection Inject 1 mL into the muscle every 7 days For one month then once a month.    XARELTO 20 MG TABS tablet TAKE 1 TABLET BY MOUTH EVERY DAY    ketoconazole (NIZORAL) 2 % cream Apply topically daily.    losartan (COZAAR) 100 MG tablet Take 1 tablet by mouth daily    isosorbide mononitrate (IMDUR) 30 MG extended release tablet Take 1

## 2025-03-04 NOTE — PROGRESS NOTES
\"Have you been to the ER, urgent care clinic since your last visit?  Hospitalized since your last visit?\"    NO    “Have you seen or consulted any other health care providers outside our system since your last visit?”    NO    Have you had a mammogram?”   YES - Where:[facility] 05/28/2024.    Date of last Mammogram: 4/12/2022       “Have you had a diabetic eye exam?”    NO     Date of last diabetic eye exam: 9/18/2019

## 2025-03-12 ENCOUNTER — RESULTS FOLLOW-UP (OUTPATIENT)
Facility: HOSPITAL | Age: 70
End: 2025-03-12

## 2025-03-12 ENCOUNTER — TELEPHONE (OUTPATIENT)
Facility: CLINIC | Age: 70
End: 2025-03-12

## 2025-03-12 DIAGNOSIS — M54.50 ACUTE MIDLINE LOW BACK PAIN WITHOUT SCIATICA: Primary | ICD-10-CM
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
## 2025-03-17 ENCOUNTER — TELEPHONE (OUTPATIENT)
Facility: CLINIC | Age: 70
End: 2025-03-17

## 2025-03-17 ENCOUNTER — PATIENT MESSAGE (OUTPATIENT)
Facility: CLINIC | Age: 70
End: 2025-03-17

## 2025-03-17 DIAGNOSIS — M54.50 ACUTE MIDLINE LOW BACK PAIN WITHOUT SCIATICA: Primary | ICD-10-CM

## 2025-03-17 NOTE — TELEPHONE ENCOUNTER
Dr Flowers    I have only 2 pills left, and I haven't seen Dr Mcqueen yet. So I need a refill on tramadol.               Zabrina Morgan          Please advise medication refill request.  Patient chart reflects active rx for Tramadol but I am unable to pend medication.    Thank you

## 2025-03-18 DIAGNOSIS — M54.50 ACUTE MIDLINE LOW BACK PAIN WITHOUT SCIATICA: Primary | ICD-10-CM

## 2025-03-18 RX ORDER — TRAMADOL HYDROCHLORIDE 50 MG/1
50 TABLET ORAL EVERY 8 HOURS PRN
Qty: 30 TABLET | Refills: 0 | Status: SHIPPED | OUTPATIENT
Start: 2025-03-18 | End: 2025-03-19 | Stop reason: SDUPTHER

## 2025-03-19 RX ORDER — TRAMADOL HYDROCHLORIDE 50 MG/1
50 TABLET ORAL EVERY 8 HOURS PRN
Qty: 30 TABLET | Refills: 0 | Status: SHIPPED | OUTPATIENT
Start: 2025-03-19 | End: 2025-04-18

## 2025-05-05 DIAGNOSIS — E53.8 VITAMIN B12 DEFICIENCY: ICD-10-CM

## 2025-05-05 RX ORDER — CYANOCOBALAMIN 1000 UG/ML
1000 INJECTION, SOLUTION INTRAMUSCULAR; SUBCUTANEOUS
Qty: 10 ML | Refills: 0 | Status: SHIPPED | OUTPATIENT
Start: 2025-05-05

## 2025-05-29 ENCOUNTER — TELEPHONE (OUTPATIENT)
Facility: CLINIC | Age: 70
End: 2025-05-29

## 2025-05-29 NOTE — TELEPHONE ENCOUNTER
Attempted to contact patient regarding upcoming Medicare wellness appointment and completion of HRA questionnaire. LVM for patient to please return call at  [phone]

## 2025-05-30 SDOH — HEALTH STABILITY: PHYSICAL HEALTH: ON AVERAGE, HOW MANY DAYS PER WEEK DO YOU ENGAGE IN MODERATE TO STRENUOUS EXERCISE (LIKE A BRISK WALK)?: 0 DAYS

## 2025-05-30 SDOH — HEALTH STABILITY: PHYSICAL HEALTH: ON AVERAGE, HOW MANY MINUTES DO YOU ENGAGE IN EXERCISE AT THIS LEVEL?: 0 MIN

## 2025-05-30 ASSESSMENT — PATIENT HEALTH QUESTIONNAIRE - PHQ9
3. TROUBLE FALLING OR STAYING ASLEEP: NEARLY EVERY DAY
SUM OF ALL RESPONSES TO PHQ QUESTIONS 1-9: 9
SUM OF ALL RESPONSES TO PHQ QUESTIONS 1-9: 9
7. TROUBLE CONCENTRATING ON THINGS, SUCH AS READING THE NEWSPAPER OR WATCHING TELEVISION: NOT AT ALL
5. POOR APPETITE OR OVEREATING: SEVERAL DAYS
10. IF YOU CHECKED OFF ANY PROBLEMS, HOW DIFFICULT HAVE THESE PROBLEMS MADE IT FOR YOU TO DO YOUR WORK, TAKE CARE OF THINGS AT HOME, OR GET ALONG WITH OTHER PEOPLE: SOMEWHAT DIFFICULT
9. THOUGHTS THAT YOU WOULD BE BETTER OFF DEAD, OR OF HURTING YOURSELF: NOT AT ALL
SUM OF ALL RESPONSES TO PHQ QUESTIONS 1-9: 9
6. FEELING BAD ABOUT YOURSELF - OR THAT YOU ARE A FAILURE OR HAVE LET YOURSELF OR YOUR FAMILY DOWN: NOT AT ALL
2. FEELING DOWN, DEPRESSED OR HOPELESS: SEVERAL DAYS
8. MOVING OR SPEAKING SO SLOWLY THAT OTHER PEOPLE COULD HAVE NOTICED. OR THE OPPOSITE, BEING SO FIGETY OR RESTLESS THAT YOU HAVE BEEN MOVING AROUND A LOT MORE THAN USUAL: NOT AT ALL
4. FEELING TIRED OR HAVING LITTLE ENERGY: MORE THAN HALF THE DAYS
SUM OF ALL RESPONSES TO PHQ QUESTIONS 1-9: 9
1. LITTLE INTEREST OR PLEASURE IN DOING THINGS: MORE THAN HALF THE DAYS

## 2025-05-30 ASSESSMENT — LIFESTYLE VARIABLES
HOW OFTEN DO YOU HAVE A DRINK CONTAINING ALCOHOL: 98
HOW OFTEN DO YOU HAVE A DRINK CONTAINING ALCOHOL: PATIENT DECLINED
HOW OFTEN DO YOU HAVE SIX OR MORE DRINKS ON ONE OCCASION: 98
HOW MANY STANDARD DRINKS CONTAINING ALCOHOL DO YOU HAVE ON A TYPICAL DAY: PATIENT DECLINED
HOW MANY STANDARD DRINKS CONTAINING ALCOHOL DO YOU HAVE ON A TYPICAL DAY: 98

## 2025-06-02 ENCOUNTER — OFFICE VISIT (OUTPATIENT)
Facility: CLINIC | Age: 70
End: 2025-06-02
Payer: MEDICARE

## 2025-06-02 VITALS
TEMPERATURE: 97.7 F | RESPIRATION RATE: 16 BRPM | BODY MASS INDEX: 44.41 KG/M2 | SYSTOLIC BLOOD PRESSURE: 137 MMHG | HEIGHT: 68 IN | WEIGHT: 293 LBS | OXYGEN SATURATION: 98 % | DIASTOLIC BLOOD PRESSURE: 73 MMHG | HEART RATE: 61 BPM

## 2025-06-02 DIAGNOSIS — I48.20 CHRONIC ATRIAL FIBRILLATION, UNSPECIFIED (HCC): ICD-10-CM

## 2025-06-02 DIAGNOSIS — E11.21 TYPE 2 DIABETES MELLITUS WITH DIABETIC NEPHROPATHY, WITHOUT LONG-TERM CURRENT USE OF INSULIN (HCC): ICD-10-CM

## 2025-06-02 DIAGNOSIS — E53.8 VITAMIN B12 DEFICIENCY: ICD-10-CM

## 2025-06-02 DIAGNOSIS — I10 ESSENTIAL (PRIMARY) HYPERTENSION: ICD-10-CM

## 2025-06-02 DIAGNOSIS — I20.9 ANGINA PECTORIS, UNSPECIFIED: ICD-10-CM

## 2025-06-02 DIAGNOSIS — M25.50 DIFFUSE ARTHRALGIA: ICD-10-CM

## 2025-06-02 DIAGNOSIS — E66.813 OBESITY, CLASS 3 (HCC): ICD-10-CM

## 2025-06-02 DIAGNOSIS — Z00.00 MEDICARE ANNUAL WELLNESS VISIT, SUBSEQUENT: Primary | ICD-10-CM

## 2025-06-02 DIAGNOSIS — R06.09 DYSPNEA ON EXERTION: ICD-10-CM

## 2025-06-02 DIAGNOSIS — M54.50 ACUTE MIDLINE LOW BACK PAIN WITHOUT SCIATICA: ICD-10-CM

## 2025-06-02 DIAGNOSIS — E03.9 ACQUIRED HYPOTHYROIDISM: ICD-10-CM

## 2025-06-02 PROCEDURE — G8400 PT W/DXA NO RESULTS DOC: HCPCS | Performed by: FAMILY MEDICINE

## 2025-06-02 PROCEDURE — 1090F PRES/ABSN URINE INCON ASSESS: CPT | Performed by: FAMILY MEDICINE

## 2025-06-02 PROCEDURE — 3078F DIAST BP <80 MM HG: CPT | Performed by: FAMILY MEDICINE

## 2025-06-02 PROCEDURE — G8427 DOCREV CUR MEDS BY ELIG CLIN: HCPCS | Performed by: FAMILY MEDICINE

## 2025-06-02 PROCEDURE — 3044F HG A1C LEVEL LT 7.0%: CPT | Performed by: FAMILY MEDICINE

## 2025-06-02 PROCEDURE — 3075F SYST BP GE 130 - 139MM HG: CPT | Performed by: FAMILY MEDICINE

## 2025-06-02 PROCEDURE — 2022F DILAT RTA XM EVC RTNOPTHY: CPT | Performed by: FAMILY MEDICINE

## 2025-06-02 PROCEDURE — 99214 OFFICE O/P EST MOD 30 MIN: CPT | Performed by: FAMILY MEDICINE

## 2025-06-02 PROCEDURE — 1159F MED LIST DOCD IN RCRD: CPT | Performed by: FAMILY MEDICINE

## 2025-06-02 PROCEDURE — G8417 CALC BMI ABV UP PARAM F/U: HCPCS | Performed by: FAMILY MEDICINE

## 2025-06-02 PROCEDURE — 1036F TOBACCO NON-USER: CPT | Performed by: FAMILY MEDICINE

## 2025-06-02 PROCEDURE — 1125F AMNT PAIN NOTED PAIN PRSNT: CPT | Performed by: FAMILY MEDICINE

## 2025-06-02 PROCEDURE — 3017F COLORECTAL CA SCREEN DOC REV: CPT | Performed by: FAMILY MEDICINE

## 2025-06-02 PROCEDURE — G0439 PPPS, SUBSEQ VISIT: HCPCS | Performed by: FAMILY MEDICINE

## 2025-06-02 PROCEDURE — 1123F ACP DISCUSS/DSCN MKR DOCD: CPT | Performed by: FAMILY MEDICINE

## 2025-06-02 RX ORDER — TRAMADOL HYDROCHLORIDE 50 MG/1
50 TABLET ORAL EVERY 8 HOURS PRN
Qty: 60 TABLET | Refills: 0 | Status: SHIPPED | OUTPATIENT
Start: 2025-07-02 | End: 2025-08-02

## 2025-06-02 RX ORDER — LOSARTAN POTASSIUM 100 MG/1
100 TABLET ORAL DAILY
Qty: 90 TABLET | Refills: 1 | Status: SHIPPED | OUTPATIENT
Start: 2025-06-02

## 2025-06-02 RX ORDER — ISOSORBIDE MONONITRATE 30 MG/1
30 TABLET, EXTENDED RELEASE ORAL EVERY MORNING
Qty: 90 TABLET | Refills: 1 | Status: SHIPPED | OUTPATIENT
Start: 2025-06-02

## 2025-06-02 RX ORDER — TRAMADOL HYDROCHLORIDE 50 MG/1
50 TABLET ORAL EVERY 8 HOURS PRN
Qty: 60 TABLET | Refills: 0 | Status: SHIPPED | OUTPATIENT
Start: 2025-06-02 | End: 2025-07-02

## 2025-06-02 RX ORDER — AMIODARONE HYDROCHLORIDE 200 MG/1
200 TABLET ORAL DAILY
Qty: 90 TABLET | Refills: 1 | Status: SHIPPED | OUTPATIENT
Start: 2025-06-02

## 2025-06-02 RX ORDER — TRAMADOL HYDROCHLORIDE 50 MG/1
50 TABLET ORAL EVERY 8 HOURS PRN
Qty: 60 TABLET | Refills: 0 | Status: SHIPPED | OUTPATIENT
Start: 2025-08-02 | End: 2025-09-01

## 2025-06-02 RX ORDER — LEVOTHYROXINE SODIUM 125 UG/1
125 TABLET ORAL DAILY
Qty: 90 TABLET | Refills: 1 | Status: SHIPPED | OUTPATIENT
Start: 2025-06-02

## 2025-06-02 RX ORDER — AMLODIPINE BESYLATE 10 MG/1
10 TABLET ORAL EVERY MORNING
Qty: 90 TABLET | Refills: 1 | Status: SHIPPED | OUTPATIENT
Start: 2025-06-02

## 2025-06-02 ASSESSMENT — ENCOUNTER SYMPTOMS
COUGH: 0
SHORTNESS OF BREATH: 1
BLOOD IN STOOL: 0

## 2025-06-02 NOTE — PATIENT INSTRUCTIONS
evening meal to breakfast (at least 12 hours) without eating anything.  This uses stored energy in your liver and makes insulin work better.  Avoid simples sugars such as table sugar in drinks (sodas, lemonade, sweet tea, wine), desserts, candy.  Also avoid fruit juices and high fructose corn syrup.  Avoid frequent consumption of fruit especially grapes and bananas which are high in sugar. Finally, drink less milk which has milk sugar in it.  Control your starch intake.  Men should have 3-4 carb portions per meal.  Women should have 2-3 carb portions per meal.  A carb serving is the equivalent of a slice of bread.    Control your blood pressure and cholesterol.  These problems are common in diabetes.  AND, don't smoke.  All of these problems contribute to heart disease and stroke risk.    Get a yearly eye exam and flu shot.    Make sure your vaccines are up to date particularly the pneumonia vaccine.    Inspect your feet daily.  Wear comfortable protective shoes and clean socks.  Seek medical care if there are sore, calluses or numbness and burning of your feet.     See your doctor at least every 6 months if you are on oral medicines or more often if the diabetic control is not at goal or if you are on insulin.  Take your medicines religiously.                  Preventing Falls: Care Instructions  Injuries and health problems such as trouble walking or poor eyesight can increase your risk of falling. So can some medicines. But there are things you can do to help prevent falls. You can exercise to get stronger. You can also arrange your home to make it safer.    Talk to your doctor about the medicines you take. Ask if any of them increase the risk of falls and whether they can be changed or stopped.   Try to exercise regularly. It can help improve your strength and balance. This can help lower your risk of falling.         Practice fall safety and prevention.   Wear low-heeled shoes that fit well and give your feet

## 2025-06-02 NOTE — PROGRESS NOTES
Chief Complaint   Patient presents with    Medicare AWV      Have you been to the ER, urgent care clinic since your last visit?  Hospitalized since your last visit?   NO    Have you seen or consulted any other health care providers outside our system since your last visit?   No    Have you had a mammogram?”   YES -     Date of last Mammogram: 4/12/2022       “Have you had a diabetic eye exam?”    NO     Date of last diabetic eye exam: 9/18/2019           
current use of insulin ([facility])  Update labs.  -     Basic Metabolic Panel; Future  -     CBC with Auto Differential; Future  -     Hemoglobin A1C; Future  11. Obesity, class 3 (E66.813)  12. Body mass index [BMI] 45.0-49.9, adult (Z68.42)       I have discussed the diagnosis with the patient and the intended plan as seen in the above orders.  she has expressed understanding.  The patient has received an after-visit summary and questions were answered concerning future plans.  I have discussed medication side effects and warnings with the patient as well.    Electronically Signed: Julio C Flowers MD    Current Medications after this visit     Current Outpatient Medications   Medication Sig    rivaroxaban (XARELTO) 20 MG TABS tablet Take 1 tablet by mouth daily    losartan (COZAAR) 100 MG tablet Take 1 tablet by mouth daily    levothyroxine (SYNTHROID) 125 MCG tablet Take 1 tablet by mouth Daily TAKE 1 TABLET DAILY BEFORE BREAKFAST    isosorbide mononitrate (IMDUR) 30 MG extended release tablet Take 1 tablet by mouth every morning    amLODIPine (NORVASC) 10 MG tablet Take 1 tablet by mouth every morning    amiodarone (CORDARONE) 200 MG tablet Take 1 tablet by mouth daily    SYRINGE-NEEDLE, DISP, 3 ML (LUER LOCK SAFETY SYRINGES) 25G X 1\" 3 ML MISC 1 each by Does not apply route every 30 days    traMADol (ULTRAM) 50 MG tablet Take 1 tablet by mouth every 8 hours as needed for Pain for up to 30 days. Take lowest dose possible to manage pain Max Daily Amount: 150 mg    [START ON 7/2/2025] traMADol (ULTRAM) 50 MG tablet Take 1 tablet by mouth every 8 hours as needed for Pain for up to 31 days. Take lowest dose possible to manage pain Max Daily Amount: 150 mg    [START ON 8/2/2025] traMADol (ULTRAM) 50 MG tablet Take 1 tablet by mouth every 8 hours as needed for Pain for up to 30 days. Take lowest dose possible to manage pain Max Daily Amount: 150 mg    cyanocobalamin 1000 MCG/ML injection Inject 1 mL into the muscle every

## 2025-06-03 LAB
ANION GAP SERPL CALC-SCNC: 10 MMOL/L (ref 2–12)
BASOPHILS # BLD: 0.03 K/UL (ref 0–0.1)
BASOPHILS NFR BLD: 0.4 % (ref 0–1)
BUN SERPL-MCNC: 19 MG/DL (ref 6–20)
BUN/CREAT SERPL: 18 (ref 12–20)
CALCIUM SERPL-MCNC: 8.8 MG/DL (ref 8.5–10.1)
CHLORIDE SERPL-SCNC: 105 MMOL/L (ref 97–108)
CO2 SERPL-SCNC: 22 MMOL/L (ref 21–32)
CREAT SERPL-MCNC: 1.08 MG/DL (ref 0.55–1.02)
DIFFERENTIAL METHOD BLD: ABNORMAL
EOSINOPHIL # BLD: 0.1 K/UL (ref 0–0.4)
EOSINOPHIL NFR BLD: 1.4 % (ref 0–7)
ERYTHROCYTE [DISTWIDTH] IN BLOOD BY AUTOMATED COUNT: 17.1 % (ref 11.5–14.5)
EST. AVERAGE GLUCOSE BLD GHB EST-MCNC: 114 MG/DL
GLUCOSE SERPL-MCNC: 85 MG/DL (ref 65–100)
HBA1C MFR BLD: 5.6 % (ref 4–5.6)
HCT VFR BLD AUTO: 32.4 % (ref 35–47)
HGB BLD-MCNC: 9.6 G/DL (ref 11.5–16)
IMM GRANULOCYTES # BLD AUTO: 0.03 K/UL (ref 0–0.04)
IMM GRANULOCYTES NFR BLD AUTO: 0.4 % (ref 0–0.5)
LYMPHOCYTES # BLD: 1.52 K/UL (ref 0.8–3.5)
LYMPHOCYTES NFR BLD: 20.8 % (ref 12–49)
MCH RBC QN AUTO: 24.4 PG (ref 26–34)
MCHC RBC AUTO-ENTMCNC: 29.6 G/DL (ref 30–36.5)
MCV RBC AUTO: 82.4 FL (ref 80–99)
MONOCYTES # BLD: 0.57 K/UL (ref 0–1)
MONOCYTES NFR BLD: 7.8 % (ref 5–13)
NEUTS SEG # BLD: 5.05 K/UL (ref 1.8–8)
NEUTS SEG NFR BLD: 69.2 % (ref 32–75)
NRBC # BLD: 0 K/UL (ref 0–0.01)
NRBC BLD-RTO: 0 PER 100 WBC
PLATELET # BLD AUTO: 326 K/UL (ref 150–400)
PMV BLD AUTO: 10.5 FL (ref 8.9–12.9)
POTASSIUM SERPL-SCNC: 3.8 MMOL/L (ref 3.5–5.1)
RBC # BLD AUTO: 3.93 M/UL (ref 3.8–5.2)
SODIUM SERPL-SCNC: 137 MMOL/L (ref 136–145)
VIT B12 SERPL-MCNC: 247 PG/ML (ref 193–986)
WBC # BLD AUTO: 7.3 K/UL (ref 3.6–11)

## 2025-06-09 ENCOUNTER — RESULTS FOLLOW-UP (OUTPATIENT)
Facility: CLINIC | Age: 70
End: 2025-06-09

## 2025-06-16 ENCOUNTER — OFFICE VISIT (OUTPATIENT)
Facility: CLINIC | Age: 70
End: 2025-06-16
Payer: MEDICARE

## 2025-06-16 VITALS
TEMPERATURE: 97.6 F | WEIGHT: 293 LBS | RESPIRATION RATE: 17 BRPM | SYSTOLIC BLOOD PRESSURE: 122 MMHG | BODY MASS INDEX: 44.41 KG/M2 | HEART RATE: 60 BPM | DIASTOLIC BLOOD PRESSURE: 66 MMHG | HEIGHT: 68 IN

## 2025-06-16 DIAGNOSIS — I48.11 LONGSTANDING PERSISTENT ATRIAL FIBRILLATION (HCC): ICD-10-CM

## 2025-06-16 DIAGNOSIS — D50.0 IRON DEFICIENCY ANEMIA DUE TO CHRONIC BLOOD LOSS: Primary | ICD-10-CM

## 2025-06-16 DIAGNOSIS — R60.9 EDEMA, UNSPECIFIED TYPE: ICD-10-CM

## 2025-06-16 DIAGNOSIS — I10 ESSENTIAL (PRIMARY) HYPERTENSION: ICD-10-CM

## 2025-06-16 PROCEDURE — 1123F ACP DISCUSS/DSCN MKR DOCD: CPT | Performed by: FAMILY MEDICINE

## 2025-06-16 PROCEDURE — 1159F MED LIST DOCD IN RCRD: CPT | Performed by: FAMILY MEDICINE

## 2025-06-16 PROCEDURE — 99214 OFFICE O/P EST MOD 30 MIN: CPT | Performed by: FAMILY MEDICINE

## 2025-06-16 PROCEDURE — 3078F DIAST BP <80 MM HG: CPT | Performed by: FAMILY MEDICINE

## 2025-06-16 PROCEDURE — 3017F COLORECTAL CA SCREEN DOC REV: CPT | Performed by: FAMILY MEDICINE

## 2025-06-16 PROCEDURE — 1126F AMNT PAIN NOTED NONE PRSNT: CPT | Performed by: FAMILY MEDICINE

## 2025-06-16 PROCEDURE — G2211 COMPLEX E/M VISIT ADD ON: HCPCS | Performed by: FAMILY MEDICINE

## 2025-06-16 PROCEDURE — 1090F PRES/ABSN URINE INCON ASSESS: CPT | Performed by: FAMILY MEDICINE

## 2025-06-16 PROCEDURE — G8417 CALC BMI ABV UP PARAM F/U: HCPCS | Performed by: FAMILY MEDICINE

## 2025-06-16 PROCEDURE — G8427 DOCREV CUR MEDS BY ELIG CLIN: HCPCS | Performed by: FAMILY MEDICINE

## 2025-06-16 PROCEDURE — 1036F TOBACCO NON-USER: CPT | Performed by: FAMILY MEDICINE

## 2025-06-16 PROCEDURE — 3074F SYST BP LT 130 MM HG: CPT | Performed by: FAMILY MEDICINE

## 2025-06-16 PROCEDURE — G8400 PT W/DXA NO RESULTS DOC: HCPCS | Performed by: FAMILY MEDICINE

## 2025-06-16 RX ORDER — BUMETANIDE 0.5 MG/1
0.5 TABLET ORAL DAILY
Qty: 30 TABLET | Refills: 2 | Status: SHIPPED | OUTPATIENT
Start: 2025-06-16

## 2025-06-16 RX ORDER — OMEPRAZOLE 40 MG/1
40 CAPSULE, DELAYED RELEASE ORAL
Qty: 90 CAPSULE | Refills: 1 | Status: SHIPPED | OUTPATIENT
Start: 2025-06-16

## 2025-06-16 ASSESSMENT — ENCOUNTER SYMPTOMS
BLOOD IN STOOL: 0
SHORTNESS OF BREATH: 0

## 2025-06-16 NOTE — PROGRESS NOTES
Have you been to the ER, urgent care clinic since your last visit?  Hospitalized since your last visit?   NO    Have you seen or consulted any other health care providers outside our system since your last visit?   NO    Have you had a mammogram?”   YES - Where: [facility] 10/2024.  Date of last Mammogram: 4/12/2022       “Have you had a diabetic eye exam?”    NO     Date of last diabetic eye exam: 9/18/2019         
omeprazole (PRILOSEC) 40 MG delayed release capsule Take 1 capsule by mouth every morning (before breakfast)    rivaroxaban (XARELTO) 20 MG TABS tablet Take 1 tablet by mouth daily    losartan (COZAAR) 100 MG tablet Take 1 tablet by mouth daily    levothyroxine (SYNTHROID) 125 MCG tablet Take 1 tablet by mouth Daily TAKE 1 TABLET DAILY BEFORE BREAKFAST    isosorbide mononitrate (IMDUR) 30 MG extended release tablet Take 1 tablet by mouth every morning    amLODIPine (NORVASC) 10 MG tablet Take 1 tablet by mouth every morning    amiodarone (CORDARONE) 200 MG tablet Take 1 tablet by mouth daily    SYRINGE-NEEDLE, DISP, 3 ML (LUER LOCK SAFETY SYRINGES) 25G X 1\" 3 ML MISC 1 each by Does not apply route every 30 days    traMADol (ULTRAM) 50 MG tablet Take 1 tablet by mouth every 8 hours as needed for Pain for up to 30 days. Take lowest dose possible to manage pain Max Daily Amount: 150 mg    [START ON 7/2/2025] traMADol (ULTRAM) 50 MG tablet Take 1 tablet by mouth every 8 hours as needed for Pain for up to 31 days. Take lowest dose possible to manage pain Max Daily Amount: 150 mg    [START ON 8/2/2025] traMADol (ULTRAM) 50 MG tablet Take 1 tablet by mouth every 8 hours as needed for Pain for up to 30 days. Take lowest dose possible to manage pain Max Daily Amount: 150 mg    cyanocobalamin 1000 MCG/ML injection Inject 1 mL into the muscle every 30 days    ketoconazole (NIZORAL) 2 % cream Apply topically daily.    acetaminophen (TYLENOL) 500 MG CAPS capsule Take 1 capsule by mouth    polyethylene glycol (GLYCOLAX) 17 GM/SCOOP powder Take 17 g by mouth daily     No current facility-administered medications for this visit.     There are no discontinued medications.  ~~~~~~~~~~~~~~~~~~~~~~~~~~~~~~~~~~~~~~~~~~~~~~~~~~~~~~~~~~~    Chief Complaint   Patient presents with    blisters on leg       History provided by patient  History of Present Illness   Zabrina Morgan is a 70 y.o. female who presents to clinic today for:  blisters on

## 2025-06-17 LAB
BASOPHILS # BLD: 0.03 K/UL (ref 0–0.1)
BASOPHILS NFR BLD: 0.4 % (ref 0–1)
DIFFERENTIAL METHOD BLD: ABNORMAL
EOSINOPHIL # BLD: 0.09 K/UL (ref 0–0.4)
EOSINOPHIL NFR BLD: 1.2 % (ref 0–7)
ERYTHROCYTE [DISTWIDTH] IN BLOOD BY AUTOMATED COUNT: 17.5 % (ref 11.5–14.5)
FERRITIN SERPL-MCNC: 9 NG/ML (ref 8–252)
HCT VFR BLD AUTO: 30 % (ref 35–47)
HGB BLD-MCNC: 9 G/DL (ref 11.5–16)
IMM GRANULOCYTES # BLD AUTO: 0.06 K/UL (ref 0–0.04)
IMM GRANULOCYTES NFR BLD AUTO: 0.8 % (ref 0–0.5)
IRON SATN MFR SERPL: 7 % (ref 20–50)
IRON SERPL-MCNC: 26 UG/DL (ref 35–150)
LYMPHOCYTES # BLD: 1.15 K/UL (ref 0.8–3.5)
LYMPHOCYTES NFR BLD: 15 % (ref 12–49)
MCH RBC QN AUTO: 24.2 PG (ref 26–34)
MCHC RBC AUTO-ENTMCNC: 30 G/DL (ref 30–36.5)
MCV RBC AUTO: 80.6 FL (ref 80–99)
MONOCYTES # BLD: 0.62 K/UL (ref 0–1)
MONOCYTES NFR BLD: 8.1 % (ref 5–13)
NEUTS SEG # BLD: 5.71 K/UL (ref 1.8–8)
NEUTS SEG NFR BLD: 74.5 % (ref 32–75)
NRBC # BLD: 0 K/UL (ref 0–0.01)
NRBC BLD-RTO: 0 PER 100 WBC
PLATELET # BLD AUTO: 282 K/UL (ref 150–400)
PMV BLD AUTO: 10.2 FL (ref 8.9–12.9)
RBC # BLD AUTO: 3.72 M/UL (ref 3.8–5.2)
TIBC SERPL-MCNC: 355 UG/DL (ref 250–450)
WBC # BLD AUTO: 7.7 K/UL (ref 3.6–11)

## 2025-06-23 ENCOUNTER — RESULTS FOLLOW-UP (OUTPATIENT)
Facility: CLINIC | Age: 70
End: 2025-06-23

## 2025-07-07 ENCOUNTER — PATIENT MESSAGE (OUTPATIENT)
Facility: CLINIC | Age: 70
End: 2025-07-07

## 2025-07-10 ENCOUNTER — TELEPHONE (OUTPATIENT)
Facility: CLINIC | Age: 70
End: 2025-07-10

## 2025-07-10 NOTE — TELEPHONE ENCOUNTER
I have called Dr. Holguin and he agrees that we should stop xarelto and he will start process of getting her ready for a Watchman.  I called the patient and she was able to get GI appointment moved up.  Apparently has appointment with extender tomorrow.  To take lab results with her to appointment.  Stop Xarelto.  [facility]

## 2025-07-16 ENCOUNTER — OFFICE VISIT (OUTPATIENT)
Facility: CLINIC | Age: 70
End: 2025-07-16

## 2025-07-16 VITALS
HEART RATE: 65 BPM | WEIGHT: 293 LBS | HEIGHT: 68 IN | DIASTOLIC BLOOD PRESSURE: 65 MMHG | TEMPERATURE: 97.4 F | RESPIRATION RATE: 17 BRPM | BODY MASS INDEX: 44.41 KG/M2 | SYSTOLIC BLOOD PRESSURE: 124 MMHG | OXYGEN SATURATION: 95 %

## 2025-07-16 DIAGNOSIS — I48.11 LONGSTANDING PERSISTENT ATRIAL FIBRILLATION (HCC): ICD-10-CM

## 2025-07-16 DIAGNOSIS — I10 ESSENTIAL (PRIMARY) HYPERTENSION: ICD-10-CM

## 2025-07-16 DIAGNOSIS — D50.0 IRON DEFICIENCY ANEMIA DUE TO CHRONIC BLOOD LOSS: Primary | ICD-10-CM

## 2025-07-16 ASSESSMENT — ENCOUNTER SYMPTOMS
BLOOD IN STOOL: 0
SHORTNESS OF BREATH: 0

## 2025-07-16 NOTE — PROGRESS NOTES
Zabrina Morgan  70 y.o. female  1955  MRN:[id number]  [facility]  Progress Note     Encounter Date: 7/16/2025    Assessment and Plan:       ICD-10-CM    1. Iron deficiency anemia due to chronic blood loss  D50.0       2. Longstanding persistent atrial fibrillation (HCC)  I48.11       3. Essential (primary) hypertension  I10         1. Iron deficiency anemia due to chronic blood loss  Now off of Watchman  GI eval being scheduled.  2. Longstanding persistent atrial fibrillation (HCC)  No longer on Xarelto.    Sees Cardiology, Dr. Holguin later this week to look at other options IE Watchman.  3. Essential (primary) hypertension  At goal  Continue other meds  FU urgently for other bleeding, syncope, etc.       I have discussed the diagnosis with the patient and the intended plan as seen in the above orders.  she has expressed understanding.  The patient has received an after-visit summary and questions were answered concerning future plans.  I have discussed medication side effects and warnings with the patient as well.    Electronically Signed: Julio C Flowers MD    Current Medications after this visit     Current Outpatient Medications   Medication Sig    bumetanide (BUMEX) 0.5 MG tablet Take 1 tablet by mouth daily    omeprazole (PRILOSEC) 40 MG delayed release capsule Take 1 capsule by mouth every morning (before breakfast)    losartan (COZAAR) 100 MG tablet Take 1 tablet by mouth daily    levothyroxine (SYNTHROID) 125 MCG tablet Take 1 tablet by mouth Daily TAKE 1 TABLET DAILY BEFORE BREAKFAST    isosorbide mononitrate (IMDUR) 30 MG extended release tablet Take 1 tablet by mouth every morning    amLODIPine (NORVASC) 10 MG tablet Take 1 tablet by mouth every morning    amiodarone (CORDARONE) 200 MG tablet Take 1 tablet by mouth daily    SYRINGE-NEEDLE, DISP, 3 ML (LUER LOCK SAFETY SYRINGES) 25G X 1\" 3 ML MISC 1 each by Does not apply route every 30 days    traMADol (ULTRAM) 50 MG

## 2025-07-16 NOTE — PROGRESS NOTES
Have you been to the ER, urgent care clinic since your last visit?  Hospitalized since your last visit?   YES - When: approximately 12 days ago.  Where and Why: Patient  was seen at [facility] due to fall at home.    Have you seen or consulted any other health care providers outside our system since your last visit?   NO    Have you had a mammogram?”   YES - Where: [facility] 10/2024.    Date of last Mammogram: 4/12/2022       “Have you had a diabetic eye exam?”    NO     Date of last diabetic eye exam: 9/18/2019

## (undated) DEVICE — SYRINGE 50ML E/T

## (undated) DEVICE — BAG BELONG PT PERS CLEAR HANDL

## (undated) DEVICE — CONTAINER SPEC 20 ML LID NEUT BUFF FORMALIN 10 % POLYPR STS

## (undated) DEVICE — CANN NASAL O2 CAPNOGRAPHY AD -- FILTERLINE

## (undated) DEVICE — KENDALL RADIOLUCENT FOAM MONITORING ELECTRODE -RECTANGULAR SHAPE: Brand: KENDALL

## (undated) DEVICE — 3M™ CUROS™ DISINFECTING CAP FOR NEEDLELESS CONNECTORS 270/CARTON 20 CARTONS/CASE CFF1-270: Brand: CUROS™

## (undated) DEVICE — POLYP TRAP: Brand: TRAPEASE®

## (undated) DEVICE — SOLIDIFIER MEDC 1200ML -- CONVERT TO 356117

## (undated) DEVICE — KIT COLON W/ 1.1OZ LUB AND 2 END

## (undated) DEVICE — Device

## (undated) DEVICE — SNARE ENDOSCP M L240CM W27MM SHTH DIA2.4MM CHN 2.8MM OVL

## (undated) DEVICE — BAG SPEC BIOHZRD 10 X 10 IN --

## (undated) DEVICE — 1200 GUARD II KIT W/5MM TUBE W/O VAC TUBE: Brand: GUARDIAN

## (undated) DEVICE — ADULT SPO2 SENSOR: Brand: NELLCOR

## (undated) DEVICE — CATH IV AUTOGRD BC BLU 22GA 25 -- INSYTE

## (undated) DEVICE — SET GRAV CK VLV NEEDLESS ST 3 GANGED 4WAY STPCOCK HI FLO 10